# Patient Record
Sex: MALE | Race: WHITE | NOT HISPANIC OR LATINO | Employment: OTHER | URBAN - METROPOLITAN AREA
[De-identification: names, ages, dates, MRNs, and addresses within clinical notes are randomized per-mention and may not be internally consistent; named-entity substitution may affect disease eponyms.]

---

## 2018-04-03 LAB
ABSOL LYMPHOCYTES (HISTORICAL): 1.8 K/UL (ref 0.5–4)
ALBUMIN SERPL BCP-MCNC: 3.7 G/DL (ref 3–5.2)
ALP SERPL-CCNC: 78 U/L (ref 43–122)
ALT SERPL W P-5'-P-CCNC: 62 U/L (ref 9–52)
ANION GAP SERPL CALCULATED.3IONS-SCNC: 8 MMOL/L (ref 5–14)
AST SERPL W P-5'-P-CCNC: 59 U/L (ref 17–59)
B-NP NT PRO (HISTORICAL): 268 PG/ML
BASOPHILS # BLD AUTO: 0 K/UL (ref 0–0.1)
BASOPHILS # BLD AUTO: 1 % (ref 0–1)
BILIRUB SERPL-MCNC: 0.6 MG/DL
BUN SERPL-MCNC: 20 MG/DL (ref 5–25)
CALCIUM SERPL-MCNC: 9.1 MG/DL (ref 8.4–10.2)
CHLORIDE SERPL-SCNC: 101 MEQ/L (ref 97–108)
CHOLEST SERPL-MCNC: 143 MG/DL
CHOLEST/HDLC SERPL: 3 {RATIO}
CO2 SERPL-SCNC: 28 MMOL/L (ref 22–30)
CREATINE, SERUM (HISTORICAL): 1.01 MG/DL (ref 0.7–1.5)
DEPRECATED RDW RBC AUTO: 13.3 %
EGFR (HISTORICAL): >60 ML/MIN/1.73 M2
EOSINOPHIL # BLD AUTO: 0.3 K/UL (ref 0–0.4)
EOSINOPHIL NFR BLD AUTO: 4 % (ref 0–6)
GLUCOSE SERPL-MCNC: 123 MG/DL (ref 70–99)
HCT VFR BLD AUTO: 40.2 % (ref 41–53)
HDLC SERPL-MCNC: 47 MG/DL
HGB BLD-MCNC: 13.4 G/DL (ref 13.5–17.5)
LDL/HDL RATIO (HISTORICAL): 1.4
LDLC SERPL CALC-MCNC: 64 MG/DL
LYMPHOCYTES NFR BLD AUTO: 23 % (ref 25–45)
MCH RBC QN AUTO: 32.4 PG (ref 26–34)
MCHC RBC AUTO-ENTMCNC: 33.3 % (ref 31–36)
MCV RBC AUTO: 97 FL (ref 80–100)
MONOCYTES # BLD AUTO: 0.7 K/UL (ref 0.2–0.9)
MONOCYTES NFR BLD AUTO: 9 % (ref 1–10)
NEUTROPHILS ABS COUNT (HISTORICAL): 5.1 K/UL (ref 1.8–7.8)
NEUTS SEG NFR BLD AUTO: 63 % (ref 45–65)
PLATELET # BLD AUTO: 171 K/MCL (ref 150–450)
POTASSIUM SERPL-SCNC: 4.4 MEQ/L (ref 3.6–5)
RBC # BLD AUTO: 4.13 M/MCL (ref 4.5–5.9)
SODIUM SERPL-SCNC: 137 MEQ/L (ref 137–147)
TOTAL PROTEIN (HISTORICAL): 6.7 G/DL (ref 5.9–8.4)
TRIGL SERPL-MCNC: 162 MG/DL
VLDLC SERPL CALC-MCNC: 32 MG/DL (ref 0–40)
WBC # BLD AUTO: 7.8 K/MCL (ref 4.5–11)

## 2018-05-31 LAB
ABSOL LYMPHOCYTES (HISTORICAL): 1.6 K/UL (ref 0.5–4)
ALBUMIN SERPL BCP-MCNC: 3.4 G/DL (ref 3–5.2)
ALP SERPL-CCNC: 64 U/L (ref 43–122)
ALT SERPL W P-5'-P-CCNC: 63 U/L (ref 9–52)
ANION GAP SERPL CALCULATED.3IONS-SCNC: 6 MMOL/L (ref 5–14)
AST SERPL W P-5'-P-CCNC: 58 U/L (ref 17–59)
B-NP NT PRO (HISTORICAL): 285 PG/ML
BASOPHILS # BLD AUTO: 0.1 K/UL (ref 0–0.1)
BASOPHILS # BLD AUTO: 1 % (ref 0–1)
BILIRUB SERPL-MCNC: 0.7 MG/DL
BUN SERPL-MCNC: 20 MG/DL (ref 5–25)
CALCIUM SERPL-MCNC: 9.1 MG/DL (ref 8.4–10.2)
CHLORIDE SERPL-SCNC: 98 MEQ/L (ref 97–108)
CO2 SERPL-SCNC: 30 MMOL/L (ref 22–30)
CREATINE, SERUM (HISTORICAL): 0.88 MG/DL (ref 0.7–1.5)
DEPRECATED RDW RBC AUTO: 13.5 %
EGFR (HISTORICAL): >60 ML/MIN/1.73 M2
EOSINOPHIL # BLD AUTO: 0.3 K/UL (ref 0–0.4)
EOSINOPHIL NFR BLD AUTO: 5 % (ref 0–6)
GLUCOSE SERPL-MCNC: 115 MG/DL (ref 70–99)
HCT VFR BLD AUTO: 38.1 % (ref 41–53)
HGB BLD-MCNC: 13.3 G/DL (ref 13.5–17.5)
LYMPHOCYTES NFR BLD AUTO: 22 % (ref 25–45)
MCH RBC QN AUTO: 34.5 PG (ref 26–34)
MCHC RBC AUTO-ENTMCNC: 35 % (ref 31–36)
MCV RBC AUTO: 98 FL (ref 80–100)
MONOCYTES # BLD AUTO: 0.7 K/UL (ref 0.2–0.9)
MONOCYTES NFR BLD AUTO: 9 % (ref 1–10)
NEUTROPHILS ABS COUNT (HISTORICAL): 4.6 K/UL (ref 1.8–7.8)
NEUTS SEG NFR BLD AUTO: 63 % (ref 45–65)
PLATELET # BLD AUTO: 159 K/MCL (ref 150–450)
POTASSIUM SERPL-SCNC: 4.4 MEQ/L (ref 3.6–5)
RBC # BLD AUTO: 3.87 M/MCL (ref 4.5–5.9)
SODIUM SERPL-SCNC: 133 MEQ/L (ref 137–147)
TOTAL PROTEIN (HISTORICAL): 6.5 G/DL (ref 5.9–8.4)
WBC # BLD AUTO: 7.3 K/MCL (ref 4.5–11)

## 2018-06-01 LAB
EST. AVERAGE GLUCOSE BLD GHB EST-MCNC: 140 MG/DL
HBA1C MFR BLD HPLC: 6.5 %

## 2018-06-28 ENCOUNTER — OFFICE VISIT (OUTPATIENT)
Dept: FAMILY MEDICINE CLINIC | Facility: CLINIC | Age: 83
End: 2018-06-28
Payer: MEDICARE

## 2018-06-28 VITALS
WEIGHT: 235 LBS | HEART RATE: 88 BPM | TEMPERATURE: 98.2 F | DIASTOLIC BLOOD PRESSURE: 72 MMHG | HEIGHT: 69 IN | SYSTOLIC BLOOD PRESSURE: 126 MMHG | RESPIRATION RATE: 16 BRPM | OXYGEN SATURATION: 99 % | BODY MASS INDEX: 34.8 KG/M2

## 2018-06-28 DIAGNOSIS — I50.32 CHRONIC DIASTOLIC HEART FAILURE DUE TO VALVULAR DISEASE (HCC): Primary | ICD-10-CM

## 2018-06-28 DIAGNOSIS — I38 CHRONIC DIASTOLIC HEART FAILURE DUE TO VALVULAR DISEASE (HCC): Primary | ICD-10-CM

## 2018-06-28 DIAGNOSIS — R07.9 CHEST PAIN IN ADULT: ICD-10-CM

## 2018-06-28 DIAGNOSIS — K21.00 GERD WITH ESOPHAGITIS: ICD-10-CM

## 2018-06-28 DIAGNOSIS — Z95.4 HISTORY OF HEART VALVE REPLACEMENT WITH TISSUE GRAFT: ICD-10-CM

## 2018-06-28 DIAGNOSIS — I10 BENIGN ESSENTIAL HYPERTENSION: ICD-10-CM

## 2018-06-28 DIAGNOSIS — C44.519 BASAL CELL CARCINOMA (BCC) OF SKIN OF OTHER PART OF TORSO: ICD-10-CM

## 2018-06-28 DIAGNOSIS — T82.897S AORTIC PROSTHETIC VALVE REGURGITATION, SEQUELA: ICD-10-CM

## 2018-06-28 DIAGNOSIS — I45.2 RIGHT BUNDLE BRANCH BLOCK WITH LEFT ANTERIOR FASCICULAR BLOCK: ICD-10-CM

## 2018-06-28 DIAGNOSIS — I25.10 MULTIPLE VESSEL CORONARY ARTERY DISEASE: ICD-10-CM

## 2018-06-28 DIAGNOSIS — I34.0 NON-RHEUMATIC MITRAL REGURGITATION: ICD-10-CM

## 2018-06-28 DIAGNOSIS — Z95.2 HISTORY OF AORTIC VALVE REPLACEMENT: ICD-10-CM

## 2018-06-28 PROBLEM — E78.5 HYPERLIPIDEMIA: Status: ACTIVE | Noted: 2017-01-26

## 2018-06-28 PROBLEM — T82.897A AORTIC PROSTHETIC VALVE REGURGITATION: Status: ACTIVE | Noted: 2017-08-10

## 2018-06-28 PROCEDURE — 93000 ELECTROCARDIOGRAM COMPLETE: CPT | Performed by: INTERNAL MEDICINE

## 2018-06-28 PROCEDURE — 99214 OFFICE O/P EST MOD 30 MIN: CPT | Performed by: INTERNAL MEDICINE

## 2018-06-28 RX ORDER — LOSARTAN POTASSIUM 100 MG/1
TABLET ORAL EVERY 24 HOURS
COMMUNITY
Start: 2017-10-03 | End: 2018-08-28 | Stop reason: SDUPTHER

## 2018-06-28 RX ORDER — BUMETANIDE 1 MG/1
1 TABLET ORAL DAILY
Qty: 90 TABLET | Refills: 0 | Status: SHIPPED | OUTPATIENT
Start: 2018-06-28 | End: 2018-08-07 | Stop reason: SDUPTHER

## 2018-06-28 RX ORDER — NITROGLYCERIN 0.4 MG/1
0.4 TABLET SUBLINGUAL
Qty: 90 TABLET | Refills: 0 | Status: SHIPPED | OUTPATIENT
Start: 2018-06-28 | End: 2018-06-28 | Stop reason: SDUPTHER

## 2018-06-28 RX ORDER — PANTOPRAZOLE SODIUM 40 MG/1
40 TABLET, DELAYED RELEASE ORAL
Qty: 30 TABLET | Refills: 11 | Status: SHIPPED | OUTPATIENT
Start: 2018-06-28 | End: 2018-08-28 | Stop reason: SDUPTHER

## 2018-06-28 RX ORDER — BUMETANIDE 1 MG/1
TABLET ORAL
Refills: 3 | COMMUNITY
Start: 2018-05-09 | End: 2018-06-28 | Stop reason: SDUPTHER

## 2018-06-28 RX ORDER — AMLODIPINE BESYLATE 2.5 MG/1
2.5 TABLET ORAL DAILY
Qty: 90 TABLET | Refills: 3 | Status: SHIPPED | OUTPATIENT
Start: 2018-06-28 | End: 2018-08-28 | Stop reason: SDUPTHER

## 2018-06-28 RX ORDER — ATORVASTATIN CALCIUM 20 MG/1
TABLET, FILM COATED ORAL EVERY 24 HOURS
COMMUNITY
Start: 2017-11-06 | End: 2018-08-28 | Stop reason: SDUPTHER

## 2018-06-28 RX ORDER — ATORVASTATIN CALCIUM 20 MG/1
20 TABLET, FILM COATED ORAL DAILY
Refills: 3 | COMMUNITY
Start: 2018-05-04 | End: 2018-06-28

## 2018-06-28 RX ORDER — HYDROCHLOROTHIAZIDE 25 MG/1
25 TABLET ORAL DAILY
Refills: 11 | COMMUNITY
Start: 2018-06-04 | End: 2018-08-28 | Stop reason: SDUPTHER

## 2018-06-28 RX ORDER — NITROGLYCERIN 0.4 MG/1
0.4 TABLET SUBLINGUAL
Qty: 30 TABLET | Refills: 11 | Status: SHIPPED | OUTPATIENT
Start: 2018-06-28 | End: 2018-08-28 | Stop reason: SDUPTHER

## 2018-06-28 RX ORDER — ALLOPURINOL 100 MG/1
100 TABLET ORAL DAILY
Refills: 3 | COMMUNITY
Start: 2018-04-12 | End: 2018-06-28

## 2018-06-28 RX ORDER — AMLODIPINE BESYLATE 5 MG/1
2.5 TABLET ORAL DAILY
COMMUNITY
Start: 2018-03-01 | End: 2018-06-28

## 2018-06-28 RX ORDER — DOXAZOSIN MESYLATE 4 MG/1
TABLET ORAL
COMMUNITY
Start: 2017-12-07 | End: 2018-08-28 | Stop reason: SDUPTHER

## 2018-06-28 RX ORDER — FLUTICASONE PROPIONATE 50 MCG
SPRAY, SUSPENSION (ML) NASAL
Refills: 11 | COMMUNITY
Start: 2018-03-30 | End: 2018-06-28

## 2018-07-03 NOTE — PROGRESS NOTES
Assessment/Plan:  Nevus r chest requires eval and bx by derm, pt referred dr padron(past hx skin ca)  Upper chest pressure likely GERD  ECG stable  Trial incr acid suppression with pantop 40 , Ntg to have at home prn  Diet reviewed  Lifestyle modifications reviewed  Medications reviewed and ordered  Laboratory tests and studies reviewed and ordered  All patient's questions answered to patient satisfaction  Problem List Items Addressed This Visit     Benign essential hypertension    Relevant Medications    doxazosin (CARDURA) 4 mg tablet    hydrochlorothiazide (HYDRODIURIL) 25 mg tablet    losartan (COZAAR) 100 MG tablet    bumetanide (BUMEX) 1 mg tablet    amLODIPine (NORVASC) 2 5 mg tablet    History of aortic valve replacement    History of heart valve replacement with tissue graft    Multiple vessel coronary artery disease    Relevant Medications    amLODIPine (NORVASC) 2 5 mg tablet    nitroglycerin (NITROSTAT) 0 4 mg SL tablet    Non-rheumatic mitral regurgitation    Relevant Medications    amLODIPine (NORVASC) 2 5 mg tablet    nitroglycerin (NITROSTAT) 0 4 mg SL tablet    Aortic prosthetic valve regurgitation    Relevant Medications    amLODIPine (NORVASC) 2 5 mg tablet    nitroglycerin (NITROSTAT) 0 4 mg SL tablet    Right bundle branch block with left anterior fascicular block    Chest pain in adult      Other Visit Diagnoses     Chronic diastolic heart failure due to valvular disease (HCC)    -  Primary    Relevant Medications    bumetanide (BUMEX) 1 mg tablet    amLODIPine (NORVASC) 2 5 mg tablet    nitroglycerin (NITROSTAT) 0 4 mg SL tablet    Other Relevant Orders    POCT ECG    GERD with esophagitis        Relevant Medications    pantoprazole (PROTONIX) 40 mg tablet    Basal cell carcinoma (BCC) of skin of other part of torso                Subjective:      Patient ID: Alexa Uriostegui is a 81 y o  male  82yo m nevus r chest with scab   Hx basal cell in past    Difficulty making timely derm appt  Reviewed with pt how to make appt with dr Gordan Essex  Hx upper chest pressur in morning like prior indigestion  awakined in am with it resolllved in 1 hr with no tx  No exertional sx but sedentayr  Hx of valve surgery  Chronic chf, Likely cad  Not symptomatic  ECG unchanged today from base  ine abnormalities  Edema and swelling gradually decr with incr in diuretics and decr bp meds  The following portions of the patient's history were reviewed and updated as appropriate: allergies, current medications, past family history, past medical history, past social history, past surgical history and problem list     Review of Systems   Constitutional: Negative for appetite change, fatigue, fever and unexpected weight change  HENT: Negative for rhinorrhea, sinus pain, sinus pressure, sneezing and sore throat  Eyes: Negative for visual disturbance  Respiratory: Negative for cough, chest tightness, shortness of breath and wheezing  Cardiovascular: Negative for chest pain, palpitations and leg swelling  Gastrointestinal: Negative for abdominal distention, abdominal pain, blood in stool, constipation, diarrhea, nausea and vomiting  Endocrine: Negative for polydipsia and polyuria  Genitourinary: Negative for decreased urine volume, difficulty urinating, dysuria, hematuria and urgency  Musculoskeletal: Negative for arthralgias, back pain, joint swelling and neck pain  Skin: Negative for rash  Allergic/Immunologic: Negative for environmental allergies  Neurological: Negative for tremors, weakness, light-headedness, numbness and headaches  Hematological: Does not bruise/bleed easily  Psychiatric/Behavioral: Negative for agitation, behavioral problems, confusion and dysphoric mood  The patient is not nervous/anxious            Objective:      /72 (BP Location: Left arm, Patient Position: Sitting, Cuff Size: Standard)   Pulse 88   Temp 98 2 °F (36 8 °C)   Resp 16   Ht 5' 9" (1 753 m) Wt 107 kg (235 lb)   SpO2 99%   BMI 34 70 kg/m²          Physical Exam   Constitutional: He is oriented to person, place, and time  He appears well-developed and well-nourished  No distress  HENT:   Head: Normocephalic and atraumatic  Nose: Nose normal    Mouth/Throat: Oropharynx is clear and moist  No oropharyngeal exudate  Eyes: Conjunctivae and EOM are normal  Pupils are equal, round, and reactive to light  No scleral icterus  Neck: Normal range of motion  Neck supple  Hepatojugular reflux and JVD present  No tracheal deviation present  No thyromegaly present  Cardiovascular: Normal rate and regular rhythm  Exam reveals no gallop and no friction rub  Murmur heard  Crescendo decrescendo systolic murmur is present with a grade of 3/6   Pulmonary/Chest: Effort normal  No respiratory distress  He has no wheezes  He has no rales  He exhibits no tenderness  Abdominal: Soft  Bowel sounds are normal  He exhibits no distension and no mass  There is no tenderness  There is no rebound and no guarding  Musculoskeletal: Normal range of motion  He exhibits edema  He exhibits no deformity  Lymphadenopathy:     He has no cervical adenopathy  Neurological: He is alert and oriented to person, place, and time  No cranial nerve deficit  Coordination normal    Skin: Skin is warm and dry  No rash noted  Psychiatric: He has a normal mood and affect   His behavior is normal  Judgment and thought content normal

## 2018-07-16 ENCOUNTER — TELEPHONE (OUTPATIENT)
Dept: FAMILY MEDICINE CLINIC | Facility: CLINIC | Age: 83
End: 2018-07-16

## 2018-07-16 NOTE — TELEPHONE ENCOUNTER
Patient's wife called stating she needs a refill her husbands Allopurinal 100mg si OD #30 with 5 Refills to [facility] at [facility] [address]  I notified the wife that I will have to check with the doctor before refilling this medication as it was written as discontinued at the 2018 office visit

## 2018-08-03 ENCOUNTER — APPOINTMENT (OUTPATIENT)
Dept: LAB | Age: 83
End: 2018-08-03
Payer: MEDICARE

## 2018-08-03 ENCOUNTER — TRANSCRIBE ORDERS (OUTPATIENT)
Dept: ADMINISTRATIVE | Facility: HOSPITAL | Age: 83
End: 2018-08-03

## 2018-08-03 DIAGNOSIS — R60.9 EDEMA, UNSPECIFIED TYPE: ICD-10-CM

## 2018-08-03 DIAGNOSIS — Z95.2 HEART VALVE REPLACED BY TRANSPLANT: ICD-10-CM

## 2018-08-03 DIAGNOSIS — I10 ESSENTIAL HYPERTENSION, MALIGNANT: Primary | ICD-10-CM

## 2018-08-03 DIAGNOSIS — I10 ESSENTIAL HYPERTENSION, MALIGNANT: ICD-10-CM

## 2018-08-03 LAB
ALBUMIN SERPL BCP-MCNC: 3.3 G/DL (ref 3.5–5)
ALP SERPL-CCNC: 75 U/L (ref 46–116)
ALT SERPL W P-5'-P-CCNC: 62 U/L (ref 12–78)
ANION GAP SERPL CALCULATED.3IONS-SCNC: 7 MMOL/L (ref 4–13)
AST SERPL W P-5'-P-CCNC: 53 U/L (ref 5–45)
BILIRUB SERPL-MCNC: 0.58 MG/DL (ref 0.2–1)
BUN SERPL-MCNC: 16 MG/DL (ref 5–25)
CALCIUM SERPL-MCNC: 8.8 MG/DL (ref 8.3–10.1)
CHLORIDE SERPL-SCNC: 89 MMOL/L (ref 100–108)
CO2 SERPL-SCNC: 29 MMOL/L (ref 21–32)
CREAT SERPL-MCNC: 1.01 MG/DL (ref 0.6–1.3)
GFR SERPL CREATININE-BSD FRML MDRD: 69 ML/MIN/1.73SQ M
GLUCOSE P FAST SERPL-MCNC: 120 MG/DL (ref 65–99)
NT-PROBNP SERPL-MCNC: 257 PG/ML
POTASSIUM SERPL-SCNC: 3.9 MMOL/L (ref 3.5–5.3)
PROT SERPL-MCNC: 7.2 G/DL (ref 6.4–8.2)
SODIUM SERPL-SCNC: 125 MMOL/L (ref 136–145)

## 2018-08-03 PROCEDURE — 80053 COMPREHEN METABOLIC PANEL: CPT

## 2018-08-03 PROCEDURE — 83880 ASSAY OF NATRIURETIC PEPTIDE: CPT

## 2018-08-03 PROCEDURE — 36415 COLL VENOUS BLD VENIPUNCTURE: CPT

## 2018-08-07 ENCOUNTER — OFFICE VISIT (OUTPATIENT)
Dept: FAMILY MEDICINE CLINIC | Facility: CLINIC | Age: 83
End: 2018-08-07
Payer: MEDICARE

## 2018-08-07 VITALS
WEIGHT: 237 LBS | DIASTOLIC BLOOD PRESSURE: 70 MMHG | HEART RATE: 66 BPM | BODY MASS INDEX: 35 KG/M2 | SYSTOLIC BLOOD PRESSURE: 124 MMHG

## 2018-08-07 DIAGNOSIS — I50.32 CHRONIC DIASTOLIC HEART FAILURE DUE TO VALVULAR DISEASE (HCC): ICD-10-CM

## 2018-08-07 DIAGNOSIS — I38 CHRONIC DIASTOLIC HEART FAILURE DUE TO VALVULAR DISEASE (HCC): ICD-10-CM

## 2018-08-07 DIAGNOSIS — Z95.4 HISTORY OF HEART VALVE REPLACEMENT WITH TISSUE GRAFT: ICD-10-CM

## 2018-08-07 DIAGNOSIS — I10 BENIGN ESSENTIAL HYPERTENSION: ICD-10-CM

## 2018-08-07 DIAGNOSIS — R60.9 PERIPHERAL EDEMA: ICD-10-CM

## 2018-08-07 DIAGNOSIS — I34.0 NON-RHEUMATIC MITRAL REGURGITATION: ICD-10-CM

## 2018-08-07 DIAGNOSIS — I25.10 MULTIPLE VESSEL CORONARY ARTERY DISEASE: ICD-10-CM

## 2018-08-07 DIAGNOSIS — T82.897D AORTIC PROSTHETIC VALVE REGURGITATION, SUBSEQUENT ENCOUNTER: ICD-10-CM

## 2018-08-07 DIAGNOSIS — E87.1 HYPONATREMIA WITH EXCESS EXTRACELLULAR FLUID VOLUME: Primary | ICD-10-CM

## 2018-08-07 DIAGNOSIS — Z95.2 HISTORY OF AORTIC VALVE REPLACEMENT: ICD-10-CM

## 2018-08-07 PROCEDURE — 99214 OFFICE O/P EST MOD 30 MIN: CPT | Performed by: INTERNAL MEDICINE

## 2018-08-07 RX ORDER — BUMETANIDE 1 MG/1
1 TABLET ORAL DAILY
Qty: 90 TABLET | Refills: 0 | Status: SHIPPED | OUTPATIENT
Start: 2018-08-07 | End: 2018-08-28 | Stop reason: SDUPTHER

## 2018-08-07 NOTE — PROGRESS NOTES
Assessment/Plan:  The patient feels well without nausea or mental deterioration despite having a sodium that is slowly fallen to 125  He drinks 3-4 quarts of liquid a day, mostly ice tea and glasses of water  He has been on and appropriately low sodium diet that has been enforced by his wife  Since last spring he has been gradually increasing his diuretics from 1 mg of Bumex daily to 2 mg of Bumex daily and then added hydrochlorothiazide which she is taking 25 mg 3 days a week  He is not taking his diuretics today  Overall his lost approximately 5 lb which is he is maintaining since he has increased his diuretic therapy  Edema is much improved  BNP is normal   Blood pressure is well controlled  Patient was placed on fluid restriction 1 core to 1/2 quarts daily  His Bumex and hydrochlorothiazide will be held today and tomorrow and then he will resume Bumex 1 mg daily and continue to hold the hydrochlorothiazide  He will have a BMP done in 2 days and be seen in the office in 3 days and then have another BMP Done in 6 days and be seen in 7 days  He will weigh himself daily and call if he gains more than 3 lb  Or if he becomes confused or nauseated  Diet reviewed  Lifestyle modifications reviewed  Medications reviewed and ordered  Laboratory tests and studies reviewed and ordered  All patient's questions answered to patient satisfaction  Diagnoses and all orders for this visit:    Hyponatremia with excess extracellular fluid volume  -     Basic metabolic panel; Future    Peripheral edema  -     Basic metabolic panel; Future    History of heart valve replacement with tissue graft    History of aortic valve replacement    Benign essential hypertension    Multiple vessel coronary artery disease    Non-rheumatic mitral regurgitation    Aortic prosthetic valve regurgitation, subsequent encounter    Chronic diastolic heart failure due to valvular disease (HCC)  -     bumetanide (BUMEX) 1 mg tablet;  Take 1 tablet (1 mg total) by mouth daily        Subjective:      Patient ID: Kathe Dukes is a 81 y o  male  HPI  This 29-year-old male has been gradually increasing doses of diuretics has lost approximately 5 lb with improvement in his peripheral edema  His BNP is normal   However his sodium has dropped from 133 6 weeks ago 225 with a normal BUN and creatinine and potassium that has maintained in the lower part of the normal range  He is not confused her nauseated  He drinks approximately 3-1/2 to 4 quarts of liquid daily and is careful to keep his sodium intake low  Current Outpatient Prescriptions:     amLODIPine (NORVASC) 2 5 mg tablet, Take 1 tablet (2 5 mg total) by mouth daily, Disp: 90 tablet, Rfl: 3    atorvastatin (LIPITOR) 20 mg tablet, every 24 hours, Disp: , Rfl:     bumetanide (BUMEX) 1 mg tablet, Take 1 tablet (1 mg total) by mouth daily, Disp: 90 tablet, Rfl: 0    doxazosin (CARDURA) 4 mg tablet, take 1 tablet by oral route  every day hs, Disp: , Rfl:     hydrochlorothiazide (HYDRODIURIL) 25 mg tablet, Take 25 mg by mouth daily, Disp: , Rfl: 11    losartan (COZAAR) 100 MG tablet, every 24 hours, Disp: , Rfl:     nitroglycerin (NITROSTAT) 0 4 mg SL tablet, Place 1 tablet (0 4 mg total) under the tongue every 5 (five) minutes as needed for chest pain, Disp: 30 tablet, Rfl: 11    pantoprazole (PROTONIX) 40 mg tablet, Take 1 tablet (40 mg total) by mouth daily at bedtime, Disp: 30 tablet, Rfl: 11    The following portions of the patient's history were reviewed and updated as appropriate: allergies, current medications, past family history, past medical history, past social history, past surgical history and problem list     Review of Systems   Constitutional: Negative for appetite change, fatigue, fever and unexpected weight change  HENT: Negative for rhinorrhea, sinus pain, sinus pressure, sneezing and sore throat  Eyes: Negative for visual disturbance     Respiratory: Negative for cough, chest tightness, shortness of breath and wheezing  Cardiovascular: Negative for chest pain, palpitations and leg swelling  Gastrointestinal: Negative for abdominal distention, abdominal pain, blood in stool, constipation, diarrhea, nausea and vomiting  Endocrine: Negative for polydipsia and polyuria  Genitourinary: Negative for decreased urine volume, difficulty urinating, dysuria, hematuria and urgency  Musculoskeletal: Negative for arthralgias, back pain, joint swelling and neck pain  Skin: Negative for rash  Allergic/Immunologic: Negative for environmental allergies  Neurological: Negative for tremors, weakness, light-headedness, numbness and headaches  Hematological: Does not bruise/bleed easily  Psychiatric/Behavioral: Negative for agitation, behavioral problems, confusion and dysphoric mood  The patient is not nervous/anxious            Family History   Problem Relation Age of Onset    Diabetes Mother     Alcohol abuse Brother     Bipolar disorder Brother     Hypertension Brother     Kidney disease Brother     Heart disease Family     Diabetes type II Family        Past Medical History:   Diagnosis Date    Aortic valve regurgitation 01/26/2017    Arthritis     Cancer ([address] )     Coronary artery disease     Edema 12/02/2016    Heart disease     Heart valve disorder     Heart valve replaced     History of basal cell cancer     Hx of tissue graft 03/29/2017    Hyperlipemia 01/26/2017    Hypertension     Non-rheumatic mitral regurgitation 10/21/2016    Right bundle branch block (RBBB), 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       Past Surgical History:   Procedure Laterality Date    APPENDECTOMY      CARDIAC SURGERY      CARDIAC VALVE REPLACEMENT      EYE SURGERY      HERNIA REPAIR      VASECTOMY         Social History     Social History    Marital status: /Civil Union     Spouse name: N/A    Number of children: N/A    Years of education: N/A     Social History Main Topics    Smoking status: Former Smoker     Types: Cigarettes, Cigars    Smokeless tobacco: Former User      Comment: quit 10 years ago, NEVER A SMOKER AS PER [employer]    Alcohol use No    Drug use: No    Sexual activity: Not Asked     Other Topics Concern    None     Social History Narrative    None       Allergies   Allergen Reactions    Orphenadrine GI Intolerance         Objective:      /70 (BP Location: Right arm, Patient Position: Sitting, Cuff Size: Standard)   Pulse 66   Wt 108 kg (237 lb)   BMI 35 00 kg/m²        Physical Exam   Constitutional: He is oriented to person, place, and time  He appears well-developed and well-nourished  No distress  HENT:   Head: Normocephalic and atraumatic  Nose: Nose normal    Mouth/Throat: Oropharynx is clear and moist  No oropharyngeal exudate  Eyes: Conjunctivae and EOM are normal  Pupils are equal, round, and reactive to light  No scleral icterus  Neck: Normal range of motion  Neck supple  No JVD present  No tracheal deviation present  No thyromegaly present  Cardiovascular: Normal rate, regular rhythm and normal heart sounds  Exam reveals no gallop and no friction rub  No murmur heard  +2 pretib edema bilat   Pulmonary/Chest: Effort normal  No respiratory distress  He has no wheezes  He has no rales  He exhibits no tenderness  Abdominal: Soft  Bowel sounds are normal  He exhibits no distension and no mass  There is no tenderness  There is no rebound and no guarding  Musculoskeletal: Normal range of motion  He exhibits edema  He exhibits no deformity  Lymphadenopathy:     He has no cervical adenopathy  Neurological: He is alert and oriented to person, place, and time  No cranial nerve deficit  Coordination normal    Skin: Skin is warm and dry  No rash noted  Psychiatric: He has a normal mood and affect   His behavior is normal  Judgment and thought content normal

## 2018-08-09 ENCOUNTER — APPOINTMENT (OUTPATIENT)
Dept: LAB | Age: 83
End: 2018-08-09
Payer: MEDICARE

## 2018-08-09 DIAGNOSIS — R60.9 PERIPHERAL EDEMA: ICD-10-CM

## 2018-08-09 DIAGNOSIS — E87.1 HYPONATREMIA WITH EXCESS EXTRACELLULAR FLUID VOLUME: ICD-10-CM

## 2018-08-09 LAB
ANION GAP SERPL CALCULATED.3IONS-SCNC: 7 MMOL/L (ref 4–13)
BUN SERPL-MCNC: 16 MG/DL (ref 5–25)
CALCIUM SERPL-MCNC: 9.2 MG/DL (ref 8.3–10.1)
CHLORIDE SERPL-SCNC: 97 MMOL/L (ref 100–108)
CO2 SERPL-SCNC: 28 MMOL/L (ref 21–32)
CREAT SERPL-MCNC: 1.05 MG/DL (ref 0.6–1.3)
GFR SERPL CREATININE-BSD FRML MDRD: 66 ML/MIN/1.73SQ M
GLUCOSE P FAST SERPL-MCNC: 126 MG/DL (ref 65–99)
POTASSIUM SERPL-SCNC: 4.3 MMOL/L (ref 3.5–5.3)
SODIUM SERPL-SCNC: 132 MMOL/L (ref 136–145)

## 2018-08-09 PROCEDURE — 80048 BASIC METABOLIC PNL TOTAL CA: CPT

## 2018-08-09 PROCEDURE — 36415 COLL VENOUS BLD VENIPUNCTURE: CPT

## 2018-08-10 ENCOUNTER — OFFICE VISIT (OUTPATIENT)
Dept: FAMILY MEDICINE CLINIC | Facility: CLINIC | Age: 83
End: 2018-08-10
Payer: MEDICARE

## 2018-08-10 VITALS
TEMPERATURE: 97.6 F | SYSTOLIC BLOOD PRESSURE: 160 MMHG | DIASTOLIC BLOOD PRESSURE: 56 MMHG | HEIGHT: 69 IN | OXYGEN SATURATION: 96 % | RESPIRATION RATE: 14 BRPM | HEART RATE: 81 BPM | WEIGHT: 234 LBS | BODY MASS INDEX: 34.66 KG/M2

## 2018-08-10 DIAGNOSIS — R60.9 PERIPHERAL EDEMA: ICD-10-CM

## 2018-08-10 DIAGNOSIS — E87.1 HYPONATREMIA WITH EXCESS EXTRACELLULAR FLUID VOLUME: Primary | ICD-10-CM

## 2018-08-10 DIAGNOSIS — E78.5 HYPERLIPIDEMIA, UNSPECIFIED HYPERLIPIDEMIA TYPE: ICD-10-CM

## 2018-08-10 DIAGNOSIS — I10 HYPERTENSION, UNSPECIFIED TYPE: ICD-10-CM

## 2018-08-10 PROCEDURE — 99213 OFFICE O/P EST LOW 20 MIN: CPT | Performed by: INTERNAL MEDICINE

## 2018-08-10 NOTE — PROGRESS NOTES
Assessment/Plan:  Na 133, no wt gain but bp higher today  Restart bumex 1mg/d, water 1 5 L /d, extra 4 mg doxa if bp>160, bmp 3d  Diet reviewed  Lifestyle modifications reviewed  Medications reviewed and ordered  Laboratory tests and studies reviewed and ordered  All patient's questions answered to patient satisfaction  Diagnoses and all orders for this visit:    Hyponatremia with excess extracellular fluid volume  -     Basic metabolic panel; Future    Hypertension, unspecified type    Hyperlipidemia, unspecified hyperlipidemia type    Peripheral edema        Subjective:      Patient ID: Baldo Colon is a 81 y o  male  HPI     This 69-year-old male has noted that he of feels improved his weight has remained stable on his order restricted diet and sodium is up to 133 after just a few days he has not taken any diuretics since I saw him 3 days ago  He is planning on resuming his Bumex 1 mg daily today and returning on Tuesday with a BMP on Monday    The following portions of the patient's history were reviewed and updated as appropriate: allergies, current medications, past family history, past medical history, past social history, past surgical history and problem list     Review of Systems   Constitutional: Negative for appetite change, fatigue, fever and unexpected weight change  HENT: Negative for rhinorrhea, sinus pain, sinus pressure, sneezing and sore throat  Eyes: Negative for visual disturbance  Respiratory: Negative for cough, chest tightness, shortness of breath and wheezing  Cardiovascular: Negative for chest pain, palpitations and leg swelling  Gastrointestinal: Negative for abdominal distention, abdominal pain, blood in stool, constipation, diarrhea, nausea and vomiting  Endocrine: Negative for polydipsia and polyuria  Genitourinary: Negative for decreased urine volume, difficulty urinating, dysuria, hematuria and urgency     Musculoskeletal: Negative for arthralgias, back pain, joint swelling and neck pain  Skin: Negative for rash  Allergic/Immunologic: Negative for environmental allergies  Neurological: Negative for tremors, weakness, light-headedness, numbness and headaches  Hematological: Does not bruise/bleed easily  Psychiatric/Behavioral: Negative for agitation, behavioral problems, confusion and dysphoric mood  The patient is not nervous/anxious            Family History   Problem Relation Age of Onset    Diabetes Mother     Alcohol abuse Brother     Bipolar disorder Brother     Hypertension Brother     Kidney disease Brother     Heart disease Family     Diabetes type II Family        Past Medical History:   Diagnosis Date    Aortic valve regurgitation 01/26/2017    Arthritis     Cancer ([address]     Coronary artery disease     Edema 12/02/2016    Heart disease     Heart valve disorder     Heart valve replaced     History of basal cell cancer     Hx of tissue graft 03/29/2017    Hyperlipemia 01/26/2017    Hypertension     Non-rheumatic mitral regurgitation 10/21/2016    Right bundle branch block (RBBB), 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       Past Surgical History:   Procedure Laterality Date    APPENDECTOMY      CARDIAC SURGERY      CARDIAC VALVE REPLACEMENT      EYE SURGERY      HERNIA REPAIR      VASECTOMY         Social History     Social History    Marital status: /Civil Union     Spouse name: N/A    Number of children: N/A    Years of education: N/A     Social History Main Topics    Smoking status: Former Smoker     Types: Cigarettes, Cigars    Smokeless tobacco: Former User      Comment: quit 10 years ago, NEVER A SMOKER AS PER [employer]    Alcohol use No    Drug use: No    Sexual activity: Not Asked     Other Topics Concern    None     Social History Narrative    None       Allergies   Allergen Reactions    Orphenadrine GI Intolerance         Objective:      /56 (BP Location: Left arm, Patient Position: Sitting, Cuff Size: Large) Comment (BP Location): left  Pulse 81   Temp 97 6 °F (36 4 °C)   Resp 14   Ht 5' 9" (1 753 m)   Wt 106 kg (234 lb)   SpO2 96%   BMI 34 56 kg/m²        Physical Exam   Constitutional: He is oriented to person, place, and time  He appears well-developed and well-nourished  No distress  HENT:   Head: Normocephalic and atraumatic  Nose: Nose normal    Mouth/Throat: Oropharynx is clear and moist  No oropharyngeal exudate  Eyes: Conjunctivae and EOM are normal  Pupils are equal, round, and reactive to light  No scleral icterus  Neck: Normal range of motion  Neck supple  No JVD present  No tracheal deviation present  No thyromegaly present  Cardiovascular: Normal rate and regular rhythm  Exam reveals no gallop and no friction rub  Murmur heard  Pulmonary/Chest: Effort normal  No respiratory distress  He has no wheezes  He has no rales  He exhibits no tenderness  Abdominal: Soft  Bowel sounds are normal  He exhibits no distension and no mass  There is no tenderness  There is no rebound and no guarding  Musculoskeletal: Normal range of motion  He exhibits edema  He exhibits no deformity  Lymphadenopathy:     He has no cervical adenopathy  Neurological: He is alert and oriented to person, place, and time  No cranial nerve deficit  Coordination normal    Skin: Skin is warm and dry  No rash noted  Psychiatric: He has a normal mood and affect   His behavior is normal  Judgment and thought content normal

## 2018-08-13 ENCOUNTER — APPOINTMENT (OUTPATIENT)
Dept: LAB | Age: 83
End: 2018-08-13
Payer: MEDICARE

## 2018-08-13 DIAGNOSIS — E87.1 HYPONATREMIA WITH EXCESS EXTRACELLULAR FLUID VOLUME: ICD-10-CM

## 2018-08-13 LAB
ANION GAP SERPL CALCULATED.3IONS-SCNC: 6 MMOL/L (ref 4–13)
BUN SERPL-MCNC: 23 MG/DL (ref 5–25)
CALCIUM SERPL-MCNC: 9.2 MG/DL (ref 8.3–10.1)
CHLORIDE SERPL-SCNC: 99 MMOL/L (ref 100–108)
CO2 SERPL-SCNC: 30 MMOL/L (ref 21–32)
CREAT SERPL-MCNC: 1.11 MG/DL (ref 0.6–1.3)
GFR SERPL CREATININE-BSD FRML MDRD: 62 ML/MIN/1.73SQ M
GLUCOSE P FAST SERPL-MCNC: 118 MG/DL (ref 65–99)
POTASSIUM SERPL-SCNC: 4.2 MMOL/L (ref 3.5–5.3)
SODIUM SERPL-SCNC: 135 MMOL/L (ref 136–145)

## 2018-08-13 PROCEDURE — 80048 BASIC METABOLIC PNL TOTAL CA: CPT

## 2018-08-13 PROCEDURE — 36415 COLL VENOUS BLD VENIPUNCTURE: CPT

## 2018-08-14 ENCOUNTER — OFFICE VISIT (OUTPATIENT)
Dept: FAMILY MEDICINE CLINIC | Facility: CLINIC | Age: 83
End: 2018-08-14
Payer: MEDICARE

## 2018-08-14 VITALS
HEART RATE: 72 BPM | HEIGHT: 71 IN | OXYGEN SATURATION: 97 % | DIASTOLIC BLOOD PRESSURE: 56 MMHG | TEMPERATURE: 96.8 F | WEIGHT: 230 LBS | SYSTOLIC BLOOD PRESSURE: 118 MMHG | RESPIRATION RATE: 18 BRPM | BODY MASS INDEX: 32.2 KG/M2

## 2018-08-14 DIAGNOSIS — M48.061 SPINAL STENOSIS OF LUMBAR REGION WITHOUT NEUROGENIC CLAUDICATION: ICD-10-CM

## 2018-08-14 DIAGNOSIS — E87.1 HYPONATREMIA WITH EXCESS EXTRACELLULAR FLUID VOLUME: Primary | ICD-10-CM

## 2018-08-14 DIAGNOSIS — I10 HYPERTENSION, UNSPECIFIED TYPE: ICD-10-CM

## 2018-08-14 DIAGNOSIS — I25.119 CORONARY ARTERY DISEASE WITH ANGINA PECTORIS, UNSPECIFIED VESSEL OR LESION TYPE, UNSPECIFIED WHETHER NATIVE OR TRANSPLANTED HEART (HCC): ICD-10-CM

## 2018-08-14 DIAGNOSIS — K59.01 CONSTIPATION BY DELAYED COLONIC TRANSIT: ICD-10-CM

## 2018-08-14 DIAGNOSIS — E78.2 MIXED HYPERLIPIDEMIA: ICD-10-CM

## 2018-08-14 DIAGNOSIS — R60.9 PERIPHERAL EDEMA: ICD-10-CM

## 2018-08-14 PROCEDURE — 99213 OFFICE O/P EST LOW 20 MIN: CPT | Performed by: INTERNAL MEDICINE

## 2018-08-14 NOTE — PROGRESS NOTES
Assessment/Plan:  na135 bun 24 cr 1 11 much improved, incr fluids to 2 0L/d, on bumex 1/d, mouth moisturizer  bp controlled  Has 1+EDEMA SACRAL AND PRETIB  Diet reviewed  Lifestyle modifications reviewed  Medications reviewed and ordered  Laboratory tests and studies reviewed and ordered  All patient's questions answered to patient satisfaction  Diagnoses and all orders for this visit:    Hyponatremia with excess extracellular fluid volume  -     Basic metabolic panel; Future    Peripheral edema  -     Basic metabolic panel; Future    Hypertension, unspecified type    Mixed hyperlipidemia    Coronary artery disease with angina pectoris, unspecified vessel or lesion type, unspecified whether native or transplanted heart ([facility] [address] )    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    Spinal stenosis of lumbar region without neurogenic claudication        Subjective:      Patient ID: Nataliya Berrios is a 81 y o  male  HPI  Na up to 135, off hct and on bumex 1mg/d and water 1 5L/d  Wt down 7 lbs total  Edema mild      Current Outpatient Prescriptions:     amLODIPine (NORVASC) 2 5 mg tablet, Take 1 tablet (2 5 mg total) by mouth daily, Disp: 90 tablet, Rfl: 3    atorvastatin (LIPITOR) 20 mg tablet, every 24 hours, Disp: , Rfl:     bumetanide (BUMEX) 1 mg tablet, Take 1 tablet (1 mg total) by mouth daily, Disp: 90 tablet, Rfl: 0    doxazosin (CARDURA) 4 mg tablet, take 1 tablet by oral route  every day hs, Disp: , Rfl:     hydrochlorothiazide (HYDRODIURIL) 25 mg tablet, Take 25 mg by mouth daily, Disp: , Rfl: 11    losartan (COZAAR) 100 MG tablet, every 24 hours, Disp: , Rfl:     nitroglycerin (NITROSTAT) 0 4 mg SL tablet, Place 1 tablet (0 4 mg total) under the tongue every 5 (five) minutes as needed for chest pain, Disp: 30 tablet, Rfl: 11    pantoprazole (PROTONIX) 40 mg tablet, Take 1 tablet (40 mg total) by mouth daily at bedtime, Disp: 30 tablet, Rfl: 11    The following portions of the patient's history were reviewed and updated as appropriate: allergies, current medications, past family history, past medical history, past social history, past surgical history and problem list     Review of Systems   Constitutional: Negative for appetite change, fatigue, fever and unexpected weight change  HENT: Negative for rhinorrhea, sinus pain, sinus pressure, sneezing and sore throat  Eyes: Negative for visual disturbance  Respiratory: Negative for cough, chest tightness, shortness of breath and wheezing  Cardiovascular: Positive for leg swelling  Negative for chest pain and palpitations  Gastrointestinal: Negative for abdominal distention, abdominal pain, blood in stool, constipation, diarrhea, nausea and vomiting  Endocrine: Negative for polydipsia and polyuria  Genitourinary: Negative for decreased urine volume, difficulty urinating, dysuria, hematuria and urgency  Musculoskeletal: Negative for arthralgias, back pain, joint swelling and neck pain  Skin: Negative for rash  Allergic/Immunologic: Negative for environmental allergies  Neurological: Negative for tremors, weakness, light-headedness, numbness and headaches  Hematological: Does not bruise/bleed easily  Psychiatric/Behavioral: Negative for agitation, behavioral problems, confusion and dysphoric mood  The patient is not nervous/anxious            Family History   Problem Relation Age of Onset    Diabetes Mother     Alcohol abuse Brother     Bipolar disorder Brother     Hypertension Brother     Kidney disease Brother     Heart disease Family     Diabetes type II Family        Past Medical History:   Diagnosis Date    Aortic valve regurgitation 01/26/2017    Arthritis     Cancer ([address] )     Coronary artery disease     Edema 12/02/2016    Heart disease     Heart valve disorder     Heart valve replaced     History of basal cell cancer     Hx of tissue graft 03/29/2017    Hyperlipemia 01/26/2017    Hypertension     Non-rheumatic mitral regurgitation 10/21/2016    Right bundle branch block (RBBB), 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       Past Surgical History:   Procedure Laterality Date    APPENDECTOMY      CARDIAC SURGERY      CARDIAC VALVE REPLACEMENT      EYE SURGERY      HERNIA REPAIR      VASECTOMY         Social History     Social History    Marital status: /Civil Union     Spouse name: N/A    Number of children: N/A    Years of education: N/A     Social History Main Topics    Smoking status: Former Smoker     Types: Cigarettes, Cigars    Smokeless tobacco: Former User      Comment: quit 10 years ago, NEVER A SMOKER AS PER [employer]    Alcohol use No    Drug use: No    Sexual activity: Yes     Partners: Female     Other Topics Concern    None     Social History Narrative    None       Allergies   Allergen Reactions    Orphenadrine GI Intolerance         Objective:      /56 (BP Location: Left arm, Patient Position: Sitting, Cuff Size: Large)   Pulse 72   Temp (!) 96 8 °F (36 °C) (Temporal)   Resp 18   Ht 5' 10 8" (1 798 m)   Wt 104 kg (230 lb)   SpO2 97%   BMI 32 26 kg/m²        Physical Exam   Constitutional: He is oriented to person, place, and time  He appears well-developed and well-nourished  No distress  HENT:   Head: Normocephalic and atraumatic  Nose: Nose normal    Mouth/Throat: Oropharynx is clear and moist  No oropharyngeal exudate  Eyes: Conjunctivae and EOM are normal  Pupils are equal, round, and reactive to light  No scleral icterus  Neck: Normal range of motion  Neck supple  No JVD present  No tracheal deviation present  No thyromegaly present  Cardiovascular: Normal rate, regular rhythm and normal heart sounds  Exam reveals no gallop and no friction rub  No murmur heard  Pulmonary/Chest: Effort normal  No respiratory distress  He has no wheezes  He has no rales  He exhibits no tenderness  Abdominal: Soft   Bowel sounds are normal  He exhibits no distension and no mass  There is no tenderness  There is no rebound and no guarding  Musculoskeletal: Normal range of motion  He exhibits edema  He exhibits no deformity  Lymphadenopathy:     He has no cervical adenopathy  Neurological: He is alert and oriented to person, place, and time  No cranial nerve deficit  Coordination normal    Skin: Skin is warm and dry  No rash noted  Psychiatric: He has a normal mood and affect   His behavior is normal  Judgment and thought content normal

## 2018-08-27 DIAGNOSIS — I10 HYPERTENSION, UNSPECIFIED TYPE: Primary | ICD-10-CM

## 2018-08-27 DIAGNOSIS — E78.5 HYPERLIPIDEMIA, UNSPECIFIED HYPERLIPIDEMIA TYPE: ICD-10-CM

## 2018-08-27 NOTE — TELEPHONE ENCOUNTER
Patient's wife called and requested a medication refill for   Atorvastatin 20 mg tablets-1 tablet daily and Losartan   100 mg tablets1 tablet daily and she would like it called into [facility] at [facility] [phone]

## 2018-08-28 DIAGNOSIS — I10 HYPERTENSION, UNSPECIFIED TYPE: ICD-10-CM

## 2018-08-28 DIAGNOSIS — K21.00 GERD WITH ESOPHAGITIS: ICD-10-CM

## 2018-08-28 DIAGNOSIS — I38 CHRONIC DIASTOLIC HEART FAILURE DUE TO VALVULAR DISEASE (HCC): Primary | ICD-10-CM

## 2018-08-28 DIAGNOSIS — I50.32 CHRONIC DIASTOLIC HEART FAILURE DUE TO VALVULAR DISEASE (HCC): Primary | ICD-10-CM

## 2018-08-28 RX ORDER — BUMETANIDE 1 MG/1
1 TABLET ORAL DAILY
Qty: 90 TABLET | Refills: 1 | Status: SHIPPED | OUTPATIENT
Start: 2018-08-28 | End: 2018-10-05 | Stop reason: SDUPTHER

## 2018-08-28 RX ORDER — ATORVASTATIN CALCIUM 20 MG/1
20 TABLET, FILM COATED ORAL EVERY 24 HOURS
Qty: 30 TABLET | Refills: 6 | Status: SHIPPED | OUTPATIENT
Start: 2018-08-28 | End: 2018-10-05 | Stop reason: SDUPTHER

## 2018-08-28 RX ORDER — AMLODIPINE BESYLATE 2.5 MG/1
2.5 TABLET ORAL DAILY
Qty: 90 TABLET | Refills: 1 | Status: SHIPPED | OUTPATIENT
Start: 2018-08-28 | End: 2019-07-10 | Stop reason: SDUPTHER

## 2018-08-28 RX ORDER — LOSARTAN POTASSIUM 100 MG/1
100 TABLET ORAL EVERY 24 HOURS
Qty: 30 TABLET | Refills: 6 | Status: SHIPPED | OUTPATIENT
Start: 2018-08-28 | End: 2018-08-28 | Stop reason: SDUPTHER

## 2018-08-28 RX ORDER — PANTOPRAZOLE SODIUM 40 MG/1
40 TABLET, DELAYED RELEASE ORAL
Qty: 90 TABLET | Refills: 1 | Status: SHIPPED | OUTPATIENT
Start: 2018-08-28 | End: 2022-03-10 | Stop reason: SDUPTHER

## 2018-08-28 RX ORDER — LOSARTAN POTASSIUM 100 MG/1
100 TABLET ORAL EVERY 24 HOURS
Qty: 30 TABLET | Refills: 0 | Status: SHIPPED | OUTPATIENT
Start: 2018-08-28 | End: 2018-10-05 | Stop reason: SDUPTHER

## 2018-08-28 RX ORDER — DOXAZOSIN MESYLATE 4 MG/1
4 TABLET ORAL
Qty: 90 TABLET | Refills: 1 | Status: SHIPPED | OUTPATIENT
Start: 2018-08-28 | End: 2019-02-25 | Stop reason: SDUPTHER

## 2018-08-28 RX ORDER — NITROGLYCERIN 0.4 MG/1
0.4 TABLET SUBLINGUAL
Qty: 90 TABLET | Refills: 1 | Status: SHIPPED | OUTPATIENT
Start: 2018-08-28

## 2018-08-28 RX ORDER — HYDROCHLOROTHIAZIDE 25 MG/1
25 TABLET ORAL DAILY
Qty: 90 TABLET | Refills: 1 | Status: SHIPPED | OUTPATIENT
Start: 2018-08-28 | End: 2018-09-04

## 2018-08-28 NOTE — TELEPHONE ENCOUNTER
----- Message from Antonio Alcantara MA sent at 8/7/2018  1:45 PM EDT -----  Regarding: med refills  Contact: [phone]  To refill his scripts

## 2018-08-30 ENCOUNTER — APPOINTMENT (OUTPATIENT)
Dept: LAB | Age: 83
End: 2018-08-30
Payer: MEDICARE

## 2018-08-30 DIAGNOSIS — E87.1 HYPONATREMIA WITH EXCESS EXTRACELLULAR FLUID VOLUME: ICD-10-CM

## 2018-08-30 DIAGNOSIS — R60.9 PERIPHERAL EDEMA: ICD-10-CM

## 2018-08-30 LAB
ANION GAP SERPL CALCULATED.3IONS-SCNC: 6 MMOL/L (ref 4–13)
BUN SERPL-MCNC: 17 MG/DL (ref 5–25)
CALCIUM SERPL-MCNC: 8.9 MG/DL (ref 8.3–10.1)
CHLORIDE SERPL-SCNC: 101 MMOL/L (ref 100–108)
CO2 SERPL-SCNC: 29 MMOL/L (ref 21–32)
CREAT SERPL-MCNC: 1.04 MG/DL (ref 0.6–1.3)
GFR SERPL CREATININE-BSD FRML MDRD: 67 ML/MIN/1.73SQ M
GLUCOSE P FAST SERPL-MCNC: 128 MG/DL (ref 65–99)
POTASSIUM SERPL-SCNC: 4.1 MMOL/L (ref 3.5–5.3)
SODIUM SERPL-SCNC: 136 MMOL/L (ref 136–145)

## 2018-08-30 PROCEDURE — 36415 COLL VENOUS BLD VENIPUNCTURE: CPT

## 2018-08-30 PROCEDURE — 80048 BASIC METABOLIC PNL TOTAL CA: CPT

## 2018-09-04 ENCOUNTER — OFFICE VISIT (OUTPATIENT)
Dept: FAMILY MEDICINE CLINIC | Facility: CLINIC | Age: 83
End: 2018-09-04
Payer: MEDICARE

## 2018-09-04 VITALS
BODY MASS INDEX: 35.16 KG/M2 | OXYGEN SATURATION: 96 % | SYSTOLIC BLOOD PRESSURE: 122 MMHG | HEART RATE: 76 BPM | DIASTOLIC BLOOD PRESSURE: 70 MMHG | HEIGHT: 68 IN | WEIGHT: 232 LBS

## 2018-09-04 DIAGNOSIS — Z95.4 HISTORY OF HEART VALVE REPLACEMENT WITH TISSUE GRAFT: ICD-10-CM

## 2018-09-04 DIAGNOSIS — I34.0 NON-RHEUMATIC MITRAL REGURGITATION: ICD-10-CM

## 2018-09-04 DIAGNOSIS — E87.1 HYPONATREMIA: Primary | ICD-10-CM

## 2018-09-04 DIAGNOSIS — Z95.2 HISTORY OF AORTIC VALVE REPLACEMENT: ICD-10-CM

## 2018-09-04 DIAGNOSIS — R60.9 PERIPHERAL EDEMA: ICD-10-CM

## 2018-09-04 DIAGNOSIS — I10 BENIGN ESSENTIAL HYPERTENSION: ICD-10-CM

## 2018-09-04 DIAGNOSIS — T82.897S AORTIC PROSTHETIC VALVE REGURGITATION, SEQUELA: ICD-10-CM

## 2018-09-04 PROCEDURE — 99213 OFFICE O/P EST LOW 20 MIN: CPT | Performed by: INTERNAL MEDICINE

## 2018-09-04 NOTE — PROGRESS NOTES
Assessment/Plan:    Sodium stable at 136  Continue fluid restriction 1 5 L per day  Patient will have basal cell removed from area above right nasal labial fold tomorrow  His told the hold his aspirin tonight and if they do minimal surgery to take more night and if they do more than minimal surgery to hold it until the next day and resume it at that time  Diet reviewed  Lifestyle modifications reviewed  Medications reviewed and ordered  Laboratory tests and studies reviewed and ordered  All patient's questions answered to patient satisfaction  Diagnoses and all orders for this visit:    Hyponatremia  -     Basic metabolic panel; Future    History of aortic valve replacement    History of heart valve replacement with tissue graft    Peripheral edema    Benign essential hypertension    Non-rheumatic mitral regurgitation    Aortic prosthetic valve regurgitation, sequela        Subjective:      Patient ID: Melly Pulido is a 81 y o  male  HPI  This patient has been maintaining a 1 5 L fluid restriction and sodium restriction in order to control his ankle edema me a and hypertension and hyponatremia  Recent sodium is 136 well-controlled BUN creatinine are slightly improved  Patient feels very well he is planning to proceed with his right facial surgery tomorrow as noted below      Current Outpatient Prescriptions:     amLODIPine (NORVASC) 2 5 mg tablet, Take 1 tablet (2 5 mg total) by mouth daily, Disp: 90 tablet, Rfl: 1    atorvastatin (LIPITOR) 20 mg tablet, Take 1 tablet (20 mg total) by mouth every 24 hours, Disp: 30 tablet, Rfl: 6    bumetanide (BUMEX) 1 mg tablet, Take 1 tablet (1 mg total) by mouth daily, Disp: 90 tablet, Rfl: 1    doxazosin (CARDURA) 4 mg tablet, Take 1 tablet (4 mg total) by mouth daily at bedtime, Disp: 90 tablet, Rfl: 1    losartan (COZAAR) 100 MG tablet, Take 1 tablet (100 mg total) by mouth every 24 hours, Disp: 30 tablet, Rfl: 0    nitroglycerin (NITROSTAT) 0 4 mg SL tablet, Place 1 tablet (0 4 mg total) under the tongue every 5 (five) minutes as needed for chest pain, Disp: 90 tablet, Rfl: 1    pantoprazole (PROTONIX) 40 mg tablet, Take 1 tablet (40 mg total) by mouth daily at bedtime, Disp: 90 tablet, Rfl: 1    The following portions of the patient's history were reviewed and updated as appropriate: allergies, current medications, past family history, past medical history, past social history, past surgical history and problem list     Review of Systems      Family History   Problem Relation Age of Onset    Diabetes Mother     Alcohol abuse Brother     Bipolar disorder Brother     Hypertension Brother     Kidney disease Brother     Heart disease Family     Diabetes type II Family        Past Medical History:   Diagnosis Date    Aortic valve regurgitation 01/26/2017    Arthritis     Cancer ([address] )     Coronary artery disease     Edema 12/02/2016    Heart disease     Heart valve disorder     Heart valve replaced     History of basal cell cancer     Hx of tissue graft 03/29/2017    Hyperlipemia 01/26/2017    Hypertension     Non-rheumatic mitral regurgitation 10/21/2016    Right bundle branch block (RBBB), 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       Past Surgical History:   Procedure Laterality Date    APPENDECTOMY      CARDIAC SURGERY      CARDIAC VALVE REPLACEMENT      EYE SURGERY      HERNIA REPAIR      VASECTOMY         Social History     Social History    Marital status: /Civil Union     Spouse name: N/A    Number of children: N/A    Years of education: N/A     Social History Main Topics    Smoking status: Former Smoker     Types: Cigarettes, Cigars    Smokeless tobacco: Former User      Comment: quit 10 years ago, NEVER A SMOKER AS PER [employer]    Alcohol use No    Drug use: No    Sexual activity: Yes     Partners: Female     Other Topics Concern    None     Social History Narrative    None       Allergies   Allergen Reactions    Orphenadrine GI Intolerance         Objective:      /70 (BP Location: Right arm, Patient Position: Sitting, Cuff Size: Standard)   Pulse 76   Ht 5' 8" (1 727 m)   Wt 105 kg (232 lb)   SpO2 96%   BMI 35 28 kg/m²        Physical Exam   Constitutional: He is oriented to person, place, and time  He appears well-developed and well-nourished  No distress  HENT:   Head: Normocephalic and atraumatic  Nose: Nose normal    Mouth/Throat: Oropharynx is clear and moist  No oropharyngeal exudate  Eyes: Conjunctivae and EOM are normal  Pupils are equal, round, and reactive to light  No scleral icterus  Neck: Normal range of motion  Neck supple  No JVD present  No tracheal deviation present  No thyromegaly present  Positive hepatic jugular reflux moderate   Cardiovascular: Normal rate and regular rhythm  Exam reveals no gallop and no friction rub  Murmur heard  Pulmonary/Chest: Effort normal  No respiratory distress  He has no wheezes  He has no rales  He exhibits no tenderness  Abdominal: Soft  Bowel sounds are normal  He exhibits no distension and no mass  There is no tenderness  There is no rebound and no guarding  Musculoskeletal: Normal range of motion  He exhibits edema  He exhibits no deformity  Lymphadenopathy:     He has no cervical adenopathy  Neurological: He is alert and oriented to person, place, and time  No cranial nerve deficit  Coordination normal    Skin: Skin is warm and dry  No rash noted  Psychiatric: He has a normal mood and affect   His behavior is normal  Judgment and thought content normal

## 2018-09-27 ENCOUNTER — APPOINTMENT (OUTPATIENT)
Dept: LAB | Age: 83
End: 2018-09-27
Payer: MEDICARE

## 2018-09-27 DIAGNOSIS — E87.1 HYPONATREMIA: ICD-10-CM

## 2018-09-27 LAB
ANION GAP SERPL CALCULATED.3IONS-SCNC: 5 MMOL/L (ref 4–13)
BUN SERPL-MCNC: 19 MG/DL (ref 5–25)
CALCIUM SERPL-MCNC: 8.7 MG/DL (ref 8.3–10.1)
CHLORIDE SERPL-SCNC: 102 MMOL/L (ref 100–108)
CO2 SERPL-SCNC: 27 MMOL/L (ref 21–32)
CREAT SERPL-MCNC: 1.12 MG/DL (ref 0.6–1.3)
GFR SERPL CREATININE-BSD FRML MDRD: 61 ML/MIN/1.73SQ M
GLUCOSE P FAST SERPL-MCNC: 132 MG/DL (ref 65–99)
POTASSIUM SERPL-SCNC: 3.9 MMOL/L (ref 3.5–5.3)
SODIUM SERPL-SCNC: 134 MMOL/L (ref 136–145)

## 2018-09-27 PROCEDURE — 36415 COLL VENOUS BLD VENIPUNCTURE: CPT

## 2018-09-27 PROCEDURE — 80048 BASIC METABOLIC PNL TOTAL CA: CPT

## 2018-10-05 ENCOUNTER — OFFICE VISIT (OUTPATIENT)
Dept: FAMILY MEDICINE CLINIC | Facility: CLINIC | Age: 83
End: 2018-10-05
Payer: MEDICARE

## 2018-10-05 VITALS
HEIGHT: 68 IN | WEIGHT: 230 LBS | DIASTOLIC BLOOD PRESSURE: 62 MMHG | BODY MASS INDEX: 34.86 KG/M2 | SYSTOLIC BLOOD PRESSURE: 126 MMHG | OXYGEN SATURATION: 96 % | HEART RATE: 67 BPM

## 2018-10-05 DIAGNOSIS — I10 HYPERTENSION, UNSPECIFIED TYPE: ICD-10-CM

## 2018-10-05 DIAGNOSIS — E87.1 HYPONATREMIA: Primary | ICD-10-CM

## 2018-10-05 DIAGNOSIS — I50.32 CHRONIC DIASTOLIC HEART FAILURE DUE TO VALVULAR DISEASE (HCC): ICD-10-CM

## 2018-10-05 DIAGNOSIS — I38 CHRONIC DIASTOLIC HEART FAILURE DUE TO VALVULAR DISEASE (HCC): ICD-10-CM

## 2018-10-05 DIAGNOSIS — Z23 ENCOUNTER FOR IMMUNIZATION: ICD-10-CM

## 2018-10-05 DIAGNOSIS — Z95.4 HISTORY OF AORTIC VALVE REPLACEMENT WITH TISSUE GRAFT: ICD-10-CM

## 2018-10-05 DIAGNOSIS — I34.0 NON-RHEUMATIC MITRAL REGURGITATION: ICD-10-CM

## 2018-10-05 DIAGNOSIS — E11.9 TYPE 2 DIABETES MELLITUS WITHOUT COMPLICATION, WITHOUT LONG-TERM CURRENT USE OF INSULIN (HCC): ICD-10-CM

## 2018-10-05 DIAGNOSIS — E78.5 HYPERLIPIDEMIA, UNSPECIFIED HYPERLIPIDEMIA TYPE: ICD-10-CM

## 2018-10-05 PROCEDURE — 90662 IIV NO PRSV INCREASED AG IM: CPT

## 2018-10-05 PROCEDURE — G0008 ADMIN INFLUENZA VIRUS VAC: HCPCS

## 2018-10-05 PROCEDURE — 99214 OFFICE O/P EST MOD 30 MIN: CPT | Performed by: INTERNAL MEDICINE

## 2018-10-05 RX ORDER — BUMETANIDE 1 MG/1
1 TABLET ORAL DAILY
Qty: 90 TABLET | Refills: 3 | Status: SHIPPED | OUTPATIENT
Start: 2018-10-05 | End: 2019-10-23 | Stop reason: SDUPTHER

## 2018-10-05 RX ORDER — ATORVASTATIN CALCIUM 20 MG/1
20 TABLET, FILM COATED ORAL EVERY 24 HOURS
Qty: 90 TABLET | Refills: 3 | Status: SHIPPED | OUTPATIENT
Start: 2018-10-05 | End: 2019-10-23 | Stop reason: SDUPTHER

## 2018-10-05 RX ORDER — LOSARTAN POTASSIUM 100 MG/1
100 TABLET ORAL EVERY 24 HOURS
Qty: 90 TABLET | Refills: 3 | Status: SHIPPED | OUTPATIENT
Start: 2018-10-05 | End: 2019-05-24 | Stop reason: SDUPTHER

## 2018-10-05 NOTE — PROGRESS NOTES
Assessment/Plan:  Patient will continue water restriction to 1/2 to 2 L of liquid daily, sodium is 134 renal function is normal   Trace edema is present bilaterally he is on Bumex 1 mg daily  Hemoglobin A1c is 6 6 fasting blood sugars 132, diet was emphasized and reviewed  Follow up hemoglobin A1c will be done  Patient had aortic valve replacement with a tissue graft in the past it has been over a year since his last echo he has a history of aortic prosthetic valve regurgitation and mitral regurgitation these will be reassessed with a echo before the next appointment    Diet reviewed  Lifestyle modifications reviewed  Medications reviewed and ordered  Laboratory tests and studies reviewed and ordered  All patient's questions answered to patient satisfaction  Diagnoses and all orders for this visit:    Hyponatremia  -     Hemoglobin A1C; Future  -     Basic metabolic panel; Future    History of aortic valve replacement with tissue graft  -     Echo complete with contrast if indicated; Future    Type 2 diabetes mellitus without complication, without long-term current use of insulin (HCC)  -     Hemoglobin A1C; Future  -     Basic metabolic panel; Future    Hypertension, unspecified type  -     losartan (COZAAR) 100 MG tablet; Take 1 tablet (100 mg total) by mouth every 24 hours    Non-rheumatic mitral regurgitation    Encounter for immunization  -     influenza vaccine, 1010-4952, high-dose, PF 0 5 mL, for patients 65 yr+ (FLUZONE HIGH-DOSE)    Hyperlipidemia, unspecified hyperlipidemia type  -     atorvastatin (LIPITOR) 20 mg tablet; Take 1 tablet (20 mg total) by mouth every 24 hours    Chronic diastolic heart failure due to valvular disease (HCC)  -     bumetanide (BUMEX) 1 mg tablet; Take 1 tablet (1 mg total) by mouth daily  -     Echo complete with contrast if indicated; Future        Subjective:      Patient ID: Theora Leyden is a 81 y o  male      HPI  This 78-year-old male was seen for the following:    Hyponatremia secondary to diuretic therapy and increase fluid intake in the past he has restricting his fluids steadily to 1 5-2 L per day and restricting sodium intake also serum sodium was 134 recently with normal renal function  Borderline diabetes mellitus:  Patient's careful of diet but does eat sweet desserts  Hemoglobin A1c 6 6 fasting blood sugar 132  Tissue aortic valve replacement  Due for reassessment of aortic valve regurgitation with echo  Patient also has history of mitral regurgitation  Hypertension is well controlled    Hyperlipidemia is well controlled  Current Outpatient Prescriptions:     amLODIPine (NORVASC) 2 5 mg tablet, Take 1 tablet (2 5 mg total) by mouth daily, Disp: 90 tablet, Rfl: 1    atorvastatin (LIPITOR) 20 mg tablet, Take 1 tablet (20 mg total) by mouth every 24 hours, Disp: 90 tablet, Rfl: 3    bumetanide (BUMEX) 1 mg tablet, Take 1 tablet (1 mg total) by mouth daily, Disp: 90 tablet, Rfl: 3    doxazosin (CARDURA) 4 mg tablet, Take 1 tablet (4 mg total) by mouth daily at bedtime, Disp: 90 tablet, Rfl: 1    losartan (COZAAR) 100 MG tablet, Take 1 tablet (100 mg total) by mouth every 24 hours, Disp: 90 tablet, Rfl: 3    nitroglycerin (NITROSTAT) 0 4 mg SL tablet, Place 1 tablet (0 4 mg total) under the tongue every 5 (five) minutes as needed for chest pain, Disp: 90 tablet, Rfl: 1    pantoprazole (PROTONIX) 40 mg tablet, Take 1 tablet (40 mg total) by mouth daily at bedtime, Disp: 90 tablet, Rfl: 1    The following portions of the patient's history were reviewed and updated as appropriate: allergies, current medications, past family history, past medical history, past social history, past surgical history and problem list     Review of Systems   Constitutional: Negative for appetite change, fatigue, fever and unexpected weight change  HENT: Negative for rhinorrhea, sinus pain, sinus pressure, sneezing and sore throat      Eyes: Negative for visual disturbance  Respiratory: Negative for cough, chest tightness, shortness of breath and wheezing  Cardiovascular: Negative for chest pain, palpitations and leg swelling  Gastrointestinal: Negative for abdominal distention, abdominal pain, blood in stool, constipation, diarrhea, nausea and vomiting  Endocrine: Negative for polydipsia and polyuria  Genitourinary: Negative for decreased urine volume, difficulty urinating, dysuria, hematuria and urgency  Musculoskeletal: Negative for arthralgias, back pain, joint swelling and neck pain  Skin: Negative for rash  Allergic/Immunologic: Negative for environmental allergies  Neurological: Negative for tremors, weakness, light-headedness, numbness and headaches  Hematological: Does not bruise/bleed easily  Psychiatric/Behavioral: Negative for agitation, behavioral problems, confusion and dysphoric mood  The patient is not nervous/anxious            Family History   Problem Relation Age of Onset    Diabetes Mother     Alcohol abuse Brother     Bipolar disorder Brother     Hypertension Brother     Kidney disease Brother     Heart disease Family     Diabetes type II Family        Past Medical History:   Diagnosis Date    Aortic valve regurgitation 01/26/2017    Arthritis     Cancer ([address]     Coronary artery disease     Edema 12/02/2016    Heart disease     Heart valve disorder     Heart valve replaced     History of basal cell cancer     Hx of tissue graft 03/29/2017    Hyperlipemia 01/26/2017    Hypertension     Non-rheumatic mitral regurgitation 10/21/2016    Right bundle branch block (RBBB), 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       Past Surgical History:   Procedure Laterality Date    APPENDECTOMY      CARDIAC SURGERY      CARDIAC VALVE REPLACEMENT      EYE SURGERY      HERNIA REPAIR      VASECTOMY         Social History     Social History    Marital status: /Civil Union Spouse name: N/A    Number of children: N/A    Years of education: N/A     Social History Main Topics    Smoking status: Former Smoker     Types: Cigarettes, Cigars    Smokeless tobacco: Former User      Comment: quit 10 years ago, NEVER A SMOKER AS PER [employer]    Alcohol use No    Drug use: No    Sexual activity: Yes     Partners: Female     Other Topics Concern    None     Social History Narrative    None       Allergies   Allergen Reactions    Orphenadrine GI Intolerance         Objective:      /62 (BP Location: Right arm, Patient Position: Sitting, Cuff Size: Standard)   Pulse 67   Ht 5' 8" (1 727 m)   Wt 104 kg (230 lb)   SpO2 96%   BMI 34 97 kg/m²        Physical Exam   Constitutional: He is oriented to person, place, and time  He appears well-developed and well-nourished  No distress  HENT:   Head: Normocephalic and atraumatic  Nose: Nose normal    Mouth/Throat: Oropharynx is clear and moist  No oropharyngeal exudate  Eyes: Pupils are equal, round, and reactive to light  Conjunctivae and EOM are normal  No scleral icterus  Neck: Normal range of motion  Neck supple  No JVD present  No tracheal deviation present  No thyromegaly present  Cardiovascular: Normal rate and regular rhythm  Exam reveals no gallop and no friction rub  Murmur heard  Pulmonary/Chest: Effort normal  No respiratory distress  He has no wheezes  He has no rales  He exhibits no tenderness  Abdominal: Soft  Bowel sounds are normal  He exhibits no distension and no mass  There is no tenderness  There is no rebound and no guarding  Musculoskeletal: Normal range of motion  He exhibits edema  He exhibits no deformity  Lymphadenopathy:     He has no cervical adenopathy  Neurological: He is alert and oriented to person, place, and time  No cranial nerve deficit  Coordination normal    Skin: Skin is warm and dry  No rash noted  Psychiatric: He has a normal mood and affect   His behavior is normal  Judgment and thought content normal

## 2018-10-15 ENCOUNTER — HOSPITAL ENCOUNTER (OUTPATIENT)
Dept: NON INVASIVE DIAGNOSTICS | Facility: HOSPITAL | Age: 83
Discharge: HOME | End: 2018-10-15
Payer: MEDICARE

## 2018-10-15 DIAGNOSIS — Z95.4 HISTORY OF AORTIC VALVE REPLACEMENT WITH TISSUE GRAFT: ICD-10-CM

## 2018-10-15 DIAGNOSIS — I50.32 CHRONIC DIASTOLIC HEART FAILURE DUE TO VALVULAR DISEASE (HCC): ICD-10-CM

## 2018-10-15 DIAGNOSIS — I38 CHRONIC DIASTOLIC HEART FAILURE DUE TO VALVULAR DISEASE (HCC): ICD-10-CM

## 2018-10-15 PROCEDURE — 93306 TTE W/DOPPLER COMPLETE: CPT

## 2018-10-16 PROCEDURE — 93306 TTE W/DOPPLER COMPLETE: CPT | Performed by: INTERNAL MEDICINE

## 2018-11-09 ENCOUNTER — APPOINTMENT (OUTPATIENT)
Dept: LAB | Age: 83
End: 2018-11-09
Payer: MEDICARE

## 2018-11-09 DIAGNOSIS — E87.1 HYPONATREMIA: ICD-10-CM

## 2018-11-09 DIAGNOSIS — E11.9 TYPE 2 DIABETES MELLITUS WITHOUT COMPLICATION, WITHOUT LONG-TERM CURRENT USE OF INSULIN (HCC): ICD-10-CM

## 2018-11-09 LAB
ANION GAP SERPL CALCULATED.3IONS-SCNC: 1 MMOL/L (ref 4–13)
BUN SERPL-MCNC: 22 MG/DL (ref 5–25)
CALCIUM SERPL-MCNC: 8.9 MG/DL (ref 8.3–10.1)
CHLORIDE SERPL-SCNC: 101 MMOL/L (ref 100–108)
CO2 SERPL-SCNC: 32 MMOL/L (ref 21–32)
CREAT SERPL-MCNC: 1.1 MG/DL (ref 0.6–1.3)
EST. AVERAGE GLUCOSE BLD GHB EST-MCNC: 143 MG/DL
GFR SERPL CREATININE-BSD FRML MDRD: 62 ML/MIN/1.73SQ M
GLUCOSE P FAST SERPL-MCNC: 124 MG/DL (ref 65–99)
HBA1C MFR BLD: 6.6 % (ref 4.2–6.3)
POTASSIUM SERPL-SCNC: 4.1 MMOL/L (ref 3.5–5.3)
SODIUM SERPL-SCNC: 134 MMOL/L (ref 136–145)

## 2018-11-09 PROCEDURE — 36415 COLL VENOUS BLD VENIPUNCTURE: CPT

## 2018-11-09 PROCEDURE — 83036 HEMOGLOBIN GLYCOSYLATED A1C: CPT

## 2018-11-09 PROCEDURE — 80048 BASIC METABOLIC PNL TOTAL CA: CPT

## 2018-11-16 ENCOUNTER — OFFICE VISIT (OUTPATIENT)
Dept: FAMILY MEDICINE CLINIC | Facility: CLINIC | Age: 83
End: 2018-11-16
Payer: MEDICARE

## 2018-11-16 VITALS
BODY MASS INDEX: 34.86 KG/M2 | DIASTOLIC BLOOD PRESSURE: 70 MMHG | SYSTOLIC BLOOD PRESSURE: 148 MMHG | WEIGHT: 230 LBS | OXYGEN SATURATION: 98 % | HEIGHT: 68 IN | HEART RATE: 72 BPM

## 2018-11-16 DIAGNOSIS — E11.9 TYPE 2 DIABETES MELLITUS WITHOUT COMPLICATION, WITHOUT LONG-TERM CURRENT USE OF INSULIN (HCC): ICD-10-CM

## 2018-11-16 DIAGNOSIS — I10 BENIGN ESSENTIAL HYPERTENSION: ICD-10-CM

## 2018-11-16 DIAGNOSIS — Z95.4 HISTORY OF HEART VALVE REPLACEMENT WITH TISSUE GRAFT: ICD-10-CM

## 2018-11-16 DIAGNOSIS — Z95.2 HISTORY OF AORTIC VALVE REPLACEMENT: ICD-10-CM

## 2018-11-16 DIAGNOSIS — Z95.4 HISTORY OF AORTIC VALVE REPLACEMENT WITH TISSUE GRAFT: ICD-10-CM

## 2018-11-16 DIAGNOSIS — E87.1 HYPONATREMIA: Primary | ICD-10-CM

## 2018-11-16 PROCEDURE — 99214 OFFICE O/P EST MOD 30 MIN: CPT | Performed by: INTERNAL MEDICINE

## 2018-11-16 NOTE — PROGRESS NOTES
Assessment/Plan:  Patient's sodium although slightly low at 132 has basically been well controlled in the patient is tolerating his water restriction currently well medications are stable  Echo demonstrates a moderately elevated prosthetic aortic valve velocity but the  felt this was appropriate for prosthetic valve of this type and that overall was working well  Diabetes is well controlled with hemoglobin A1c of 6 6 the patient is controlled on diet alone    Diet reviewed  Lifestyle modifications reviewed  Medications reviewed and ordered  Laboratory tests and studies reviewed and ordered  All patient's questions answered to patient satisfaction  Diagnoses and all orders for this visit:    Hyponatremia  -     Basic metabolic panel; Future    History of aortic valve replacement with tissue graft    Type 2 diabetes mellitus without complication, without long-term current use of insulin (HCC)    Benign essential hypertension    History of aortic valve replacement    History of heart valve replacement with tissue graft        Subjective:      Patient ID: Zully Blank is a 81 y o  male  HPI  This 59-year-old male is here for the following:    Hyponatremia secondary to increased fluid intake and diuretic therapy and congestive heart failure  Electrolytes were reviewed with the patient as noted above treatment was reviewed with the patient detail  Type 2 diabetes mellitus controlled with diet hemoglobin A1c 6 6 patient would prefer no medication at this time and will here to his diet    Prosthetic aortic valve patient had a 2D echo the results were very good as noted above this will be followed in the year with another echo      Current Outpatient Prescriptions:     amLODIPine (NORVASC) 2 5 mg tablet, Take 1 tablet (2 5 mg total) by mouth daily, Disp: 90 tablet, Rfl: 1    atorvastatin (LIPITOR) 20 mg tablet, Take 1 tablet (20 mg total) by mouth every 24 hours, Disp: 90 tablet, Rfl: 3   bumetanide (BUMEX) 1 mg tablet, Take 1 tablet (1 mg total) by mouth daily, Disp: 90 tablet, Rfl: 3    doxazosin (CARDURA) 4 mg tablet, Take 1 tablet (4 mg total) by mouth daily at bedtime, Disp: 90 tablet, Rfl: 1    losartan (COZAAR) 100 MG tablet, Take 1 tablet (100 mg total) by mouth every 24 hours, Disp: 90 tablet, Rfl: 3    nitroglycerin (NITROSTAT) 0 4 mg SL tablet, Place 1 tablet (0 4 mg total) under the tongue every 5 (five) minutes as needed for chest pain, Disp: 90 tablet, Rfl: 1    pantoprazole (PROTONIX) 40 mg tablet, Take 1 tablet (40 mg total) by mouth daily at bedtime, Disp: 90 tablet, Rfl: 1    The following portions of the patient's history were reviewed and updated as appropriate: allergies, current medications, past family history, past medical history, past social history, past surgical history and problem list     Review of Systems   Constitutional: Negative for appetite change, fatigue, fever and unexpected weight change  HENT: Negative for rhinorrhea, sinus pain, sinus pressure, sneezing and sore throat  Eyes: Negative for visual disturbance  Respiratory: Negative for cough, chest tightness, shortness of breath and wheezing  Cardiovascular: Negative for chest pain, palpitations and leg swelling  Gastrointestinal: Negative for abdominal distention, abdominal pain, blood in stool, constipation, diarrhea, nausea and vomiting  Endocrine: Negative for polydipsia and polyuria  Genitourinary: Negative for decreased urine volume, difficulty urinating, dysuria, hematuria and urgency  Musculoskeletal: Negative for arthralgias, back pain, joint swelling and neck pain  Skin: Negative for rash  Allergic/Immunologic: Negative for environmental allergies  Neurological: Negative for tremors, weakness, light-headedness, numbness and headaches  Hematological: Does not bruise/bleed easily     Psychiatric/Behavioral: Negative for agitation, behavioral problems, confusion and dysphoric mood  The patient is not nervous/anxious  Family History   Problem Relation Age of Onset    Diabetes Mother     Alcohol abuse Brother     Bipolar disorder Brother     Hypertension Brother     Kidney disease Brother     Heart disease Family     Diabetes type II Family        Past Medical History:   Diagnosis Date    Aortic valve regurgitation 01/26/2017    Arthritis     Cancer ([address] )     Coronary artery disease     Edema 12/02/2016    Heart disease     Heart valve disorder     Heart valve replaced     History of basal cell cancer     Hx of tissue graft 03/29/2017    Hyperlipemia 01/26/2017    Hypertension     Non-rheumatic mitral regurgitation 10/21/2016    Right bundle branch block (RBBB), 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       Past Surgical History:   Procedure Laterality Date    APPENDECTOMY      CARDIAC SURGERY      CARDIAC VALVE REPLACEMENT      EYE SURGERY      HERNIA REPAIR      VASECTOMY         Social History     Social History    Marital status: /Civil Union     Spouse name: N/A    Number of children: N/A    Years of education: N/A     Social History Main Topics    Smoking status: Former Smoker     Types: Cigarettes, Cigars    Smokeless tobacco: Former User      Comment: quit 10 years ago, NEVER A SMOKER AS PER [employer]    Alcohol use No    Drug use: No    Sexual activity: Yes     Partners: Female     Other Topics Concern    None     Social History Narrative    None       Allergies   Allergen Reactions    Orphenadrine GI Intolerance         Objective:      /70 (BP Location: Right arm, Patient Position: Sitting, Cuff Size: Large)   Pulse 72   Ht 5' 8" (1 727 m)   Wt 104 kg (230 lb)   SpO2 98%   BMI 34 97 kg/m²        Physical Exam   Constitutional: He is oriented to person, place, and time  He appears well-developed and well-nourished  No distress  HENT:   Head: Normocephalic and atraumatic  Nose: Nose normal    Mouth/Throat: Oropharynx is clear and moist  No oropharyngeal exudate  Eyes: Pupils are equal, round, and reactive to light  Conjunctivae and EOM are normal  No scleral icterus  Neck: Normal range of motion  Neck supple  No JVD present  No tracheal deviation present  No thyromegaly present  Cardiovascular: Normal rate and regular rhythm  Exam reveals no gallop and no friction rub  Murmur heard  Pulmonary/Chest: Effort normal  No respiratory distress  He has no wheezes  He has no rales  He exhibits no tenderness  Abdominal: Soft  Bowel sounds are normal  He exhibits no distension and no mass  There is no tenderness  There is no rebound and no guarding  Musculoskeletal: Normal range of motion  He exhibits edema  He exhibits no deformity  Trace to slightly more than trace bilateral pretibial edema   Lymphadenopathy:     He has no cervical adenopathy  Neurological: He is alert and oriented to person, place, and time  No cranial nerve deficit  Coordination normal    Skin: Skin is warm and dry  No rash noted  Psychiatric: He has a normal mood and affect   His behavior is normal  Judgment and thought content normal

## 2018-12-07 ENCOUNTER — APPOINTMENT (OUTPATIENT)
Dept: LAB | Age: 83
End: 2018-12-07
Payer: MEDICARE

## 2018-12-07 DIAGNOSIS — E87.1 HYPONATREMIA: ICD-10-CM

## 2018-12-07 LAB
ANION GAP SERPL CALCULATED.3IONS-SCNC: 5 MMOL/L (ref 4–13)
BUN SERPL-MCNC: 21 MG/DL (ref 5–25)
CALCIUM SERPL-MCNC: 9.3 MG/DL (ref 8.3–10.1)
CHLORIDE SERPL-SCNC: 102 MMOL/L (ref 100–108)
CO2 SERPL-SCNC: 30 MMOL/L (ref 21–32)
CREAT SERPL-MCNC: 1.09 MG/DL (ref 0.6–1.3)
GFR SERPL CREATININE-BSD FRML MDRD: 63 ML/MIN/1.73SQ M
GLUCOSE P FAST SERPL-MCNC: 131 MG/DL (ref 65–99)
POTASSIUM SERPL-SCNC: 4 MMOL/L (ref 3.5–5.3)
SODIUM SERPL-SCNC: 137 MMOL/L (ref 136–145)

## 2018-12-07 PROCEDURE — 36415 COLL VENOUS BLD VENIPUNCTURE: CPT

## 2018-12-07 PROCEDURE — 80048 BASIC METABOLIC PNL TOTAL CA: CPT

## 2018-12-12 RX ORDER — ALLOPURINOL 100 MG/1
100 TABLET ORAL DAILY
Refills: 3 | COMMUNITY
Start: 2018-10-08 | End: 2019-05-24 | Stop reason: SDUPTHER

## 2018-12-14 ENCOUNTER — OFFICE VISIT (OUTPATIENT)
Dept: FAMILY MEDICINE CLINIC | Facility: CLINIC | Age: 83
End: 2018-12-14
Payer: MEDICARE

## 2018-12-14 VITALS
HEIGHT: 68 IN | TEMPERATURE: 97.2 F | HEART RATE: 76 BPM | BODY MASS INDEX: 35.01 KG/M2 | WEIGHT: 231 LBS | RESPIRATION RATE: 17 BRPM | OXYGEN SATURATION: 98 % | SYSTOLIC BLOOD PRESSURE: 148 MMHG | DIASTOLIC BLOOD PRESSURE: 62 MMHG

## 2018-12-14 DIAGNOSIS — E87.1 HYPONATREMIA: Primary | ICD-10-CM

## 2018-12-14 DIAGNOSIS — I25.10 MULTIPLE VESSEL CORONARY ARTERY DISEASE: ICD-10-CM

## 2018-12-14 DIAGNOSIS — I34.0 NON-RHEUMATIC MITRAL REGURGITATION: ICD-10-CM

## 2018-12-14 DIAGNOSIS — T82.897S AORTIC PROSTHETIC VALVE REGURGITATION, SEQUELA: ICD-10-CM

## 2018-12-14 DIAGNOSIS — I10 BENIGN ESSENTIAL HYPERTENSION: ICD-10-CM

## 2018-12-14 DIAGNOSIS — Z95.4 HISTORY OF HEART VALVE REPLACEMENT WITH TISSUE GRAFT: ICD-10-CM

## 2018-12-14 DIAGNOSIS — I45.2 RIGHT BUNDLE BRANCH BLOCK WITH LEFT ANTERIOR FASCICULAR BLOCK: ICD-10-CM

## 2018-12-14 DIAGNOSIS — E11.9 TYPE 2 DIABETES MELLITUS WITHOUT COMPLICATION, WITHOUT LONG-TERM CURRENT USE OF INSULIN (HCC): ICD-10-CM

## 2018-12-14 PROCEDURE — 93000 ELECTROCARDIOGRAM COMPLETE: CPT | Performed by: INTERNAL MEDICINE

## 2018-12-14 PROCEDURE — 99214 OFFICE O/P EST MOD 30 MIN: CPT | Performed by: INTERNAL MEDICINE

## 2018-12-14 RX ORDER — METFORMIN HYDROCHLORIDE 500 MG/1
500 TABLET, EXTENDED RELEASE ORAL
Qty: 90 TABLET | Refills: 3 | Status: SHIPPED | OUTPATIENT
Start: 2018-12-14 | End: 2019-11-27 | Stop reason: SDUPTHER

## 2018-12-14 NOTE — PROGRESS NOTES
Assessment/Plan:     the patient's hyponatremia is improved at 137  Renal function is normal with GFR of 62  The patient has only mild peripheral edema  Blood pressures are well controlled home in the 271 systolic  EKG continues demonstrated right bundle-branch block and left axis deviation of minus 29°  Blood sugars fasting continuing the 130s the the patient feels he would like to start metformin  mg before supper daily  He is normally constipated and perhaps this will help relieve his constipation  Diet reviewed  Lifestyle modifications reviewed  Medications reviewed and ordered  Laboratory tests and studies reviewed and ordered  All patient's questions answered to patient satisfaction  Diagnoses and all orders for this visit:    Hyponatremia  -     CBC and differential; Future  -     Basic metabolic panel; Future  -     CBC and differential; Future  -     Basic metabolic panel; Future  -     metFORMIN (GLUCOPHAGE-XR) 500 mg 24 hr tablet; Take 1 tablet (500 mg total) by mouth daily with dinner    History of heart valve replacement with tissue graft  -     CBC and differential; Future  -     Basic metabolic panel; Future    Type 2 diabetes mellitus without complication, without long-term current use of insulin (HCC)  -     Microalbumin / creatinine urine ratio  -     POCT ECG  -     CBC and differential; Future  -     Basic metabolic panel; Future  -     CBC and differential; Future  -     Basic metabolic panel; Future  -     metFORMIN (GLUCOPHAGE-XR) 500 mg 24 hr tablet; Take 1 tablet (500 mg total) by mouth daily with dinner  -     Blood Glucose Monitoring Suppl KIT; by Does not apply route daily Test once daily  -     glucose blood (ONE TOUCH ULTRA TEST) test strip; Use as instructed    Benign essential hypertension  -     CBC and differential; Future  -     Basic metabolic panel;  Future    Right bundle branch block with left anterior fascicular block  -     Microalbumin / creatinine urine ratio  -     POCT ECG  -     CBC and differential; Future  -     Basic metabolic panel; Future    Non-rheumatic mitral regurgitation  -     CBC and differential; Future  -     Basic metabolic panel; Future    Multiple vessel coronary artery disease  -     CBC and differential; Future  -     Basic metabolic panel; Future    Aortic prosthetic valve regurgitation, sequela  -     CBC and differential; Future  -     Basic metabolic panel; Future    Other orders  -     allopurinol (ZYLOPRIM) 100 mg tablet; Take 100 mg by mouth daily  -     Cancel: POCT ECG        Subjective:      Patient ID: Theora Leyden is a 81 y o  male  HPI  This 15-year-old male is seen for the following:    Hyponatremia sodium is now 137 patient's restricting his water appropriately he has mild +1 peripheral edema on Bumex 1 mg daily  GFR is 64  Hypertension although slightly elevated here if it is in the 120s at home and well controlled on the current amlodipine 2 5 mg daily and metoprolol  Type 2 diabetes mellitus patient's blood sugar remains in the 130s  He would like to start checking sugars at home and take a low dose of metformin he has chronic constipation which he thinks will be helped by this  EKG continues to demonstrate right bundle-branch block with left anterior fascicular block that is borderline with a QRS of -29 degrees  Prosthetic aortic valve stable with no shortness of breath or chest pain      Current Outpatient Prescriptions:     allopurinol (ZYLOPRIM) 100 mg tablet, Take 100 mg by mouth daily, Disp: , Rfl: 3    amLODIPine (NORVASC) 2 5 mg tablet, Take 1 tablet (2 5 mg total) by mouth daily, Disp: 90 tablet, Rfl: 1    atorvastatin (LIPITOR) 20 mg tablet, Take 1 tablet (20 mg total) by mouth every 24 hours, Disp: 90 tablet, Rfl: 3    bumetanide (BUMEX) 1 mg tablet, Take 1 tablet (1 mg total) by mouth daily, Disp: 90 tablet, Rfl: 3    doxazosin (CARDURA) 4 mg tablet, Take 1 tablet (4 mg total) by mouth daily at bedtime, Disp: 90 tablet, Rfl: 1    losartan (COZAAR) 100 MG tablet, Take 1 tablet (100 mg total) by mouth every 24 hours, Disp: 90 tablet, Rfl: 3    nitroglycerin (NITROSTAT) 0 4 mg SL tablet, Place 1 tablet (0 4 mg total) under the tongue every 5 (five) minutes as needed for chest pain, Disp: 90 tablet, Rfl: 1    pantoprazole (PROTONIX) 40 mg tablet, Take 1 tablet (40 mg total) by mouth daily at bedtime, Disp: 90 tablet, Rfl: 1    Blood Glucose Monitoring Suppl KIT, by Does not apply route daily Test once daily, Disp: 1 each, Rfl: 0    glucose blood (ONE TOUCH ULTRA TEST) test strip, Use as instructed, Disp: 100 each, Rfl: 6    metFORMIN (GLUCOPHAGE-XR) 500 mg 24 hr tablet, Take 1 tablet (500 mg total) by mouth daily with dinner, Disp: 90 tablet, Rfl: 3    The following portions of the patient's history were reviewed and updated as appropriate: allergies, current medications, past family history, past medical history, past social history, past surgical history and problem list     Review of Systems   Constitutional: Negative for appetite change, fatigue, fever and unexpected weight change  HENT: Negative for rhinorrhea, sinus pain, sinus pressure, sneezing and sore throat  Eyes: Negative for visual disturbance  Respiratory: Negative for cough, chest tightness, shortness of breath and wheezing  Cardiovascular: Negative for chest pain, palpitations and leg swelling  Gastrointestinal: Negative for abdominal distention, abdominal pain, blood in stool, constipation, diarrhea, nausea and vomiting  Endocrine: Negative for polydipsia and polyuria  Genitourinary: Negative for decreased urine volume, difficulty urinating, dysuria, hematuria and urgency  Musculoskeletal: Negative for arthralgias, back pain, joint swelling and neck pain  Skin: Negative for rash  Allergic/Immunologic: Negative for environmental allergies     Neurological: Negative for tremors, weakness, light-headedness, numbness and headaches  Hematological: Does not bruise/bleed easily  Psychiatric/Behavioral: Negative for agitation, behavioral problems, confusion and dysphoric mood  The patient is not nervous/anxious            Family History   Problem Relation Age of Onset    Diabetes Mother     Alcohol abuse Brother     Bipolar disorder Brother     Hypertension Brother     Kidney disease Brother     Heart disease Family     Diabetes type II Family        Past Medical History:   Diagnosis Date    Aortic valve regurgitation 01/26/2017    Arthritis     Cancer ([address]     Coronary artery disease     Edema 12/02/2016    Heart disease     Heart valve disorder     Heart valve replaced     History of basal cell cancer     Hx of tissue graft 03/29/2017    Hyperlipemia 01/26/2017    Hypertension     Non-rheumatic mitral regurgitation 10/21/2016    Right bundle branch block (RBBB), 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       Past Surgical History:   Procedure Laterality Date    APPENDECTOMY      CARDIAC SURGERY      CARDIAC VALVE REPLACEMENT      EYE SURGERY      HERNIA REPAIR      VASECTOMY         Social History     Social History    Marital status: /Civil Union     Spouse name: N/A    Number of children: 3    Years of education: N/A     Occupational History    retired      Social History Main Topics    Smoking status: Former Smoker     Types: Cigarettes, Cigars    Smokeless tobacco: Former User      Comment: quit 10 years ago, NEVER A SMOKER AS PER [employer]    Alcohol use No    Drug use: No    Sexual activity: Yes     Partners: Female     Other Topics Concern    None     Social History Narrative    PT states he does not drink any caffeine        Allergies   Allergen Reactions    Orphenadrine GI Intolerance         Objective:      /62   Pulse 76   Temp (!) 97 2 °F (36 2 °C)   Resp 17   Ht 5' 8" (1 727 m)   Wt 105 kg (231 lb)   SpO2 98%   BMI 35 12 kg/m²        Physical Exam   Constitutional: He is oriented to person, place, and time  He appears well-developed and well-nourished  No distress  HENT:   Head: Normocephalic and atraumatic  Nose: Nose normal    Mouth/Throat: Oropharynx is clear and moist  No oropharyngeal exudate  Eyes: Pupils are equal, round, and reactive to light  Conjunctivae and EOM are normal  No scleral icterus  Neck: Normal range of motion  Neck supple  No JVD present  No tracheal deviation present  No thyromegaly present  Cardiovascular: Normal rate, regular rhythm and normal heart sounds  Exam reveals no gallop and no friction rub  No murmur heard  Pulmonary/Chest: Effort normal  No respiratory distress  He has no wheezes  He has no rales  He exhibits no tenderness  Abdominal: Soft  Bowel sounds are normal  He exhibits no distension and no mass  There is no tenderness  There is no rebound and no guarding  Musculoskeletal: Normal range of motion  He exhibits edema  He exhibits no deformity  Lymphadenopathy:     He has no cervical adenopathy  Neurological: He is alert and oriented to person, place, and time  No cranial nerve deficit  Coordination normal    Skin: Skin is warm and dry  No rash noted  Psychiatric: He has a normal mood and affect   His behavior is normal  Judgment and thought content normal

## 2018-12-19 ENCOUNTER — TELEPHONE (OUTPATIENT)
Dept: FAMILY MEDICINE CLINIC | Facility: CLINIC | Age: 83
End: 2018-12-19

## 2018-12-27 DIAGNOSIS — E11.9 TYPE 2 DIABETES MELLITUS WITHOUT COMPLICATION, WITHOUT LONG-TERM CURRENT USE OF INSULIN (HCC): Primary | ICD-10-CM

## 2018-12-27 RX ORDER — BLOOD-GLUCOSE METER
EACH MISCELLANEOUS DAILY
Qty: 1 EACH | Refills: 0 | Status: SHIPPED | OUTPATIENT
Start: 2018-12-27 | End: 2019-11-29 | Stop reason: SDUPTHER

## 2019-01-11 ENCOUNTER — APPOINTMENT (OUTPATIENT)
Dept: LAB | Age: 84
End: 2019-01-11
Payer: MEDICARE

## 2019-01-11 DIAGNOSIS — I25.10 MULTIPLE VESSEL CORONARY ARTERY DISEASE: ICD-10-CM

## 2019-01-11 DIAGNOSIS — T82.897S AORTIC PROSTHETIC VALVE REGURGITATION, SEQUELA: ICD-10-CM

## 2019-01-11 DIAGNOSIS — I10 BENIGN ESSENTIAL HYPERTENSION: ICD-10-CM

## 2019-01-11 DIAGNOSIS — E87.1 HYPONATREMIA: ICD-10-CM

## 2019-01-11 DIAGNOSIS — Z95.4 HISTORY OF HEART VALVE REPLACEMENT WITH TISSUE GRAFT: ICD-10-CM

## 2019-01-11 DIAGNOSIS — I45.2 RIGHT BUNDLE BRANCH BLOCK WITH LEFT ANTERIOR FASCICULAR BLOCK: ICD-10-CM

## 2019-01-11 DIAGNOSIS — E11.9 TYPE 2 DIABETES MELLITUS WITHOUT COMPLICATION, WITHOUT LONG-TERM CURRENT USE OF INSULIN (HCC): ICD-10-CM

## 2019-01-11 DIAGNOSIS — I34.0 NON-RHEUMATIC MITRAL REGURGITATION: ICD-10-CM

## 2019-01-11 LAB
ANION GAP SERPL CALCULATED.3IONS-SCNC: 6 MMOL/L (ref 4–13)
BASOPHILS # BLD AUTO: 0.05 THOUSANDS/ΜL (ref 0–0.1)
BASOPHILS NFR BLD AUTO: 1 % (ref 0–1)
BUN SERPL-MCNC: 23 MG/DL (ref 5–25)
CALCIUM SERPL-MCNC: 8.9 MG/DL (ref 8.3–10.1)
CHLORIDE SERPL-SCNC: 101 MMOL/L (ref 100–108)
CO2 SERPL-SCNC: 28 MMOL/L (ref 21–32)
CREAT SERPL-MCNC: 1.27 MG/DL (ref 0.6–1.3)
EOSINOPHIL # BLD AUTO: 0.31 THOUSAND/ΜL (ref 0–0.61)
EOSINOPHIL NFR BLD AUTO: 4 % (ref 0–6)
ERYTHROCYTE [DISTWIDTH] IN BLOOD BY AUTOMATED COUNT: 12.7 % (ref 11.6–15.1)
GFR SERPL CREATININE-BSD FRML MDRD: 52 ML/MIN/1.73SQ M
GLUCOSE P FAST SERPL-MCNC: 127 MG/DL (ref 65–99)
HCT VFR BLD AUTO: 39 % (ref 36.5–49.3)
HGB BLD-MCNC: 12.9 G/DL (ref 12–17)
IMM GRANULOCYTES # BLD AUTO: 0.03 THOUSAND/UL (ref 0–0.2)
IMM GRANULOCYTES NFR BLD AUTO: 0 % (ref 0–2)
LYMPHOCYTES # BLD AUTO: 2.08 THOUSANDS/ΜL (ref 0.6–4.47)
LYMPHOCYTES NFR BLD AUTO: 26 % (ref 14–44)
MCH RBC QN AUTO: 33.3 PG (ref 26.8–34.3)
MCHC RBC AUTO-ENTMCNC: 33.1 G/DL (ref 31.4–37.4)
MCV RBC AUTO: 101 FL (ref 82–98)
MONOCYTES # BLD AUTO: 0.71 THOUSAND/ΜL (ref 0.17–1.22)
MONOCYTES NFR BLD AUTO: 9 % (ref 4–12)
NEUTROPHILS # BLD AUTO: 4.97 THOUSANDS/ΜL (ref 1.85–7.62)
NEUTS SEG NFR BLD AUTO: 60 % (ref 43–75)
NRBC BLD AUTO-RTO: 0 /100 WBCS
PLATELET # BLD AUTO: 179 THOUSANDS/UL (ref 149–390)
PMV BLD AUTO: 10.4 FL (ref 8.9–12.7)
POTASSIUM SERPL-SCNC: 4.2 MMOL/L (ref 3.5–5.3)
RBC # BLD AUTO: 3.87 MILLION/UL (ref 3.88–5.62)
SODIUM SERPL-SCNC: 135 MMOL/L (ref 136–145)
WBC # BLD AUTO: 8.15 THOUSAND/UL (ref 4.31–10.16)

## 2019-01-11 PROCEDURE — 36415 COLL VENOUS BLD VENIPUNCTURE: CPT

## 2019-01-11 PROCEDURE — 85025 COMPLETE CBC W/AUTO DIFF WBC: CPT

## 2019-01-11 PROCEDURE — 80048 BASIC METABOLIC PNL TOTAL CA: CPT

## 2019-01-17 ENCOUNTER — OFFICE VISIT (OUTPATIENT)
Dept: FAMILY MEDICINE CLINIC | Facility: CLINIC | Age: 84
End: 2019-01-17
Payer: MEDICARE

## 2019-01-17 VITALS
TEMPERATURE: 97.7 F | BODY MASS INDEX: 34.49 KG/M2 | DIASTOLIC BLOOD PRESSURE: 70 MMHG | OXYGEN SATURATION: 95 % | HEART RATE: 76 BPM | HEIGHT: 68 IN | RESPIRATION RATE: 16 BRPM | WEIGHT: 227.6 LBS | SYSTOLIC BLOOD PRESSURE: 146 MMHG

## 2019-01-17 DIAGNOSIS — T82.897S AORTIC PROSTHETIC VALVE REGURGITATION, SEQUELA: ICD-10-CM

## 2019-01-17 DIAGNOSIS — E11.9 TYPE 2 DIABETES MELLITUS WITHOUT COMPLICATION, WITHOUT LONG-TERM CURRENT USE OF INSULIN (HCC): Primary | ICD-10-CM

## 2019-01-17 DIAGNOSIS — R60.0 LOCALIZED EDEMA: ICD-10-CM

## 2019-01-17 DIAGNOSIS — E87.1 HYPONATREMIA WITH EXCESS EXTRACELLULAR FLUID VOLUME: ICD-10-CM

## 2019-01-17 DIAGNOSIS — I25.10 MULTIPLE VESSEL CORONARY ARTERY DISEASE: ICD-10-CM

## 2019-01-17 DIAGNOSIS — I34.0 NON-RHEUMATIC MITRAL REGURGITATION: ICD-10-CM

## 2019-01-17 DIAGNOSIS — I10 BENIGN ESSENTIAL HYPERTENSION: ICD-10-CM

## 2019-01-17 PROCEDURE — 99214 OFFICE O/P EST MOD 30 MIN: CPT | Performed by: INTERNAL MEDICINE

## 2019-01-17 RX ORDER — IPRATROPIUM BROMIDE 21 UG/1
SPRAY, METERED NASAL
Refills: 11 | COMMUNITY
Start: 2019-01-14 | End: 2020-01-24 | Stop reason: SDUPTHER

## 2019-01-17 RX ORDER — FLUTICASONE PROPIONATE 50 MCG
SPRAY, SUSPENSION (ML) NASAL
Refills: 11 | COMMUNITY
Start: 2019-01-14 | End: 2020-01-24 | Stop reason: SDUPTHER

## 2019-02-25 DIAGNOSIS — I50.32 CHRONIC DIASTOLIC HEART FAILURE DUE TO VALVULAR DISEASE (HCC): ICD-10-CM

## 2019-02-25 DIAGNOSIS — I10 HYPERTENSION, UNSPECIFIED TYPE: ICD-10-CM

## 2019-02-25 DIAGNOSIS — I38 CHRONIC DIASTOLIC HEART FAILURE DUE TO VALVULAR DISEASE (HCC): ICD-10-CM

## 2019-02-25 DIAGNOSIS — K21.00 GERD WITH ESOPHAGITIS: ICD-10-CM

## 2019-02-26 RX ORDER — DOXAZOSIN MESYLATE 4 MG/1
4 TABLET ORAL
Qty: 90 TABLET | Refills: 1 | Status: SHIPPED | OUTPATIENT
Start: 2019-02-26 | End: 2019-08-26 | Stop reason: SDUPTHER

## 2019-03-14 ENCOUNTER — APPOINTMENT (OUTPATIENT)
Dept: LAB | Age: 84
End: 2019-03-14
Payer: MEDICARE

## 2019-03-14 DIAGNOSIS — E11.9 TYPE 2 DIABETES MELLITUS WITHOUT COMPLICATION, WITHOUT LONG-TERM CURRENT USE OF INSULIN (HCC): ICD-10-CM

## 2019-03-14 LAB
ALBUMIN SERPL BCP-MCNC: 3.2 G/DL (ref 3.5–5)
ALP SERPL-CCNC: 75 U/L (ref 46–116)
ALT SERPL W P-5'-P-CCNC: 42 U/L (ref 12–78)
ANION GAP SERPL CALCULATED.3IONS-SCNC: 4 MMOL/L (ref 4–13)
AST SERPL W P-5'-P-CCNC: 38 U/L (ref 5–45)
BASOPHILS # BLD AUTO: 0.03 THOUSANDS/ΜL (ref 0–0.1)
BASOPHILS NFR BLD AUTO: 0 % (ref 0–1)
BILIRUB SERPL-MCNC: 0.62 MG/DL (ref 0.2–1)
BUN SERPL-MCNC: 22 MG/DL (ref 5–25)
CALCIUM SERPL-MCNC: 8.7 MG/DL (ref 8.3–10.1)
CHLORIDE SERPL-SCNC: 102 MMOL/L (ref 100–108)
CO2 SERPL-SCNC: 29 MMOL/L (ref 21–32)
CREAT SERPL-MCNC: 1.1 MG/DL (ref 0.6–1.3)
EOSINOPHIL # BLD AUTO: 0.31 THOUSAND/ΜL (ref 0–0.61)
EOSINOPHIL NFR BLD AUTO: 4 % (ref 0–6)
ERYTHROCYTE [DISTWIDTH] IN BLOOD BY AUTOMATED COUNT: 12.7 % (ref 11.6–15.1)
EST. AVERAGE GLUCOSE BLD GHB EST-MCNC: 137 MG/DL
GFR SERPL CREATININE-BSD FRML MDRD: 62 ML/MIN/1.73SQ M
GLUCOSE P FAST SERPL-MCNC: 117 MG/DL (ref 65–99)
HBA1C MFR BLD: 6.4 % (ref 4.2–6.3)
HCT VFR BLD AUTO: 37.4 % (ref 36.5–49.3)
HGB BLD-MCNC: 12.5 G/DL (ref 12–17)
IMM GRANULOCYTES # BLD AUTO: 0.02 THOUSAND/UL (ref 0–0.2)
IMM GRANULOCYTES NFR BLD AUTO: 0 % (ref 0–2)
LYMPHOCYTES # BLD AUTO: 1.94 THOUSANDS/ΜL (ref 0.6–4.47)
LYMPHOCYTES NFR BLD AUTO: 26 % (ref 14–44)
MCH RBC QN AUTO: 33.4 PG (ref 26.8–34.3)
MCHC RBC AUTO-ENTMCNC: 33.4 G/DL (ref 31.4–37.4)
MCV RBC AUTO: 100 FL (ref 82–98)
MONOCYTES # BLD AUTO: 0.76 THOUSAND/ΜL (ref 0.17–1.22)
MONOCYTES NFR BLD AUTO: 10 % (ref 4–12)
NEUTROPHILS # BLD AUTO: 4.4 THOUSANDS/ΜL (ref 1.85–7.62)
NEUTS SEG NFR BLD AUTO: 60 % (ref 43–75)
NRBC BLD AUTO-RTO: 0 /100 WBCS
PLATELET # BLD AUTO: 153 THOUSANDS/UL (ref 149–390)
PMV BLD AUTO: 10.9 FL (ref 8.9–12.7)
POTASSIUM SERPL-SCNC: 4 MMOL/L (ref 3.5–5.3)
PROT SERPL-MCNC: 6.9 G/DL (ref 6.4–8.2)
RBC # BLD AUTO: 3.74 MILLION/UL (ref 3.88–5.62)
SODIUM SERPL-SCNC: 135 MMOL/L (ref 136–145)
WBC # BLD AUTO: 7.46 THOUSAND/UL (ref 4.31–10.16)

## 2019-03-14 PROCEDURE — 80053 COMPREHEN METABOLIC PANEL: CPT

## 2019-03-14 PROCEDURE — 85025 COMPLETE CBC W/AUTO DIFF WBC: CPT

## 2019-03-14 PROCEDURE — 83036 HEMOGLOBIN GLYCOSYLATED A1C: CPT

## 2019-03-14 PROCEDURE — 36415 COLL VENOUS BLD VENIPUNCTURE: CPT

## 2019-03-15 NOTE — PROGRESS NOTES
Assessment and Plan:  Problem List Items Addressed This Visit        Cardiovascular and Mediastinum    Coronary artery disease with angina pectoris ([facility] )      Other Visit Diagnoses     Medicare annual wellness visit, subsequent    -  Primary    Chronic diastolic heart failure due to valvular disease ([facility] )        Relevant Orders    Walker    Basic metabolic panel    Hypertension, unspecified type        Type 2 diabetes mellitus without complication, without long-term current use of insulin (HCC)        Abnormal gait        Relevant Orders    Walker    Chronic hyponatremia        Relevant Orders    Basic metabolic panel        Health Maintenance Due   Topic Date Due    BMI: Followup Plan  08/27/1953         HPI:  Patient Active Problem List   Diagnosis    Benign essential hypertension    History of aortic valve replacement    History of heart valve replacement with tissue graft    Hyperlipidemia    Multiple vessel coronary artery disease    Non-rheumatic mitral regurgitation    Peripheral edema    Aortic prosthetic valve regurgitation    Right bundle branch block with left anterior fascicular block    Chest pain in adult    Hyponatremia with excess extracellular fluid volume    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    Spinal stenosis of lumbar region without neurogenic claudication    Edema    Coronary artery disease with angina pectoris ([facility] )     Past Medical History:   Diagnosis Date    Aortic valve regurgitation 01/26/2017    Arthritis     Cancer ([facility] )     Coronary artery disease     Edema 12/02/2016    Heart disease     Heart valve disorder     Heart valve replaced     History of basal cell cancer     Hx of tissue graft 03/29/2017    Hyperlipemia 01/26/2017    Hypertension     Non-rheumatic mitral regurgitation 10/21/2016    Right bundle branch block (RBBB), 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     Past Surgical History:   Procedure Laterality Date    APPENDECTOMY      CARDIAC SURGERY      CARDIAC VALVE REPLACEMENT      EYE SURGERY      HERNIA REPAIR      VASECTOMY       Family History   Problem Relation Age of Onset    Diabetes Mother     Alcohol abuse Brother     Bipolar disorder Brother     Hypertension Brother     Kidney disease Brother     Heart disease Family     Diabetes type II Family      Social History     Tobacco Use   Smoking Status Former Smoker    Types: Cigarettes, Cigars   Smokeless Tobacco Former User   Tobacco Comment    quit 10 years ago, NEVER A SMOKER AS PER [employer]     Social History     Substance and Sexual Activity   Alcohol Use No      Social History     Substance and Sexual Activity   Drug Use No         Current Outpatient Medications   Medication Sig Dispense Refill    allopurinol (ZYLOPRIM) 100 mg tablet Take 100 mg by mouth daily  3    amLODIPine (NORVASC) 2 5 mg tablet Take 1 tablet (2 5 mg total) by mouth daily 90 tablet 1    atorvastatin (LIPITOR) 20 mg tablet Take 1 tablet (20 mg total) by mouth every 24 hours 90 tablet 3    Blood Glucose Monitoring Suppl (ONE TOUCH ULTRA 2) w/Device KIT by Does not apply route daily To test blood sugar 1x daily 1 each 0    Blood Glucose Monitoring Suppl KIT by Does not apply route daily Test once daily 1 each 0    bumetanide (BUMEX) 1 mg tablet Take 1 tablet (1 mg total) by mouth daily 90 tablet 3    doxazosin (CARDURA) 4 mg tablet Take 1 tablet (4 mg total) by mouth daily at bedtime 90 tablet 1    fluticasone (FLONASE) 50 mcg/act nasal spray SPRAY 1 SPRAY INTO EACH NOSTRIL TWICE DAILY  11    glucose blood (ONE TOUCH ULTRA TEST) test strip Use as instructed 100 each 6    glucose blood (ONE TOUCH ULTRA TEST) test strip To test blood sugar 1x daily 100 each 6    ipratropium (ATROVENT) 0 03 % nasal spray SPRAY 1 SPRAY INTO EACH NOSTRIL TWICE DAILY  11    losartan (COZAAR) 100 MG tablet Take 1 tablet (100 mg total) by mouth every 24 hours 90 tablet 3    metFORMIN (GLUCOPHAGE-XR) 500 mg 24 hr tablet Take 1 tablet (500 mg total) by mouth daily with dinner 90 tablet 3    nitroglycerin (NITROSTAT) 0 4 mg SL tablet Place 1 tablet (0 4 mg total) under the tongue every 5 (five) minutes as needed for chest pain 90 tablet 1    ONE TOUCH LANCETS MISC by Does not apply route daily To test blood sugar 1x daily 100 each 6    pantoprazole (PROTONIX) 40 mg tablet Take 1 tablet (40 mg total) by mouth daily at bedtime 90 tablet 1     No current facility-administered medications for this visit  Allergies   Allergen Reactions    Orphenadrine GI Intolerance     Immunization History   Administered Date(s) Administered    INFLUENZA 10/06/2016, 10/10/2017    Influenza Split High Dose Preservative Free IM 10/06/2016, 10/10/2017    Influenza, high dose seasonal 0 5 mL 10/05/2018    Pneumococcal Conjugate 13-Valent 01/26/2017       Patient Care Team:  Donal Bauer MD as PCP - General (Internal Medicine)    Medicare Screening Tests and Risk Assessments:  Tian Diaz is here for his Subsequent Wellness visit  Health Risk Assessment:  Patient rates overall health as fair  Patient feels that their physical health rating is Same  Eyesight was rated as Same  Hearing was rated as Same  Patient feels that their emotional and mental health rating is Same  Pain experienced by patient in the last 7 days has been None  Patient states that he has experienced no weight loss or gain in last 6 months  Emotional/Mental Health:  Patient has been feeling nervous/anxious  PHQ-9 Depression Screening:    Frequency of the following problems over the past two weeks:      1  Little interest or pleasure in doing things: 0 - not at all      2  Feeling down, depressed, or hopeless: 0 - not at all  PHQ-2 Score: 0          Broken Bones/Falls:     Fall Risk Assessment:    In the past year, patient has experienced: No history of falling in past year          Bladder/Bowel:  Patient has not leaked urine accidently in the last six months  Patient reports no loss of bowel control  Immunizations:  Patient has had a flu vaccination within the last year  Patient has received a pneumonia shot  Patient has received a shingles shot  Patient has not received tetanus/diphtheria shot  Home Safety:  Patient does not have trouble with stairs inside or outside of their home  Patient currently reports that there are no safety hazards present in home, working smoke alarms, working carbon monoxide detectors  Preventative Screenings:   no prostate cancer screen performed, no colon cancer screen completed, cholesterol screen completed, glaucoma eye exam completed,     Nutrition:  Current diet: Regular with servings of the following:    Medications:  Patient is not currently taking any over-the-counter supplements  Patient is able to manage medications  Lifestyle Choices:  Patient reports no tobacco use  Patient has not smoked or used tobacco in the past   Patient reports no alcohol use  Patient does not drive a vehicle  Patient does not wear seat belt  Activities of Daily Living:  Can get out of bed by his or her self, able to dress self, unable to make own meals, unable to do own shopping, able to bathe self, can do own laundry/housekeeping, can manage own money, pay bills and track expenses    Previous Hospitalizations:  No hospitalization or ED visit in past 12 months        Advanced Directives:  Patient has decided on a power of   Patient has spoken to designated power of   Patient has completed advanced directive          Preventative Screening/Counseling:      Cardiovascular:      General: Risks and Benefits Discussed and Screening Current      Counseling: Healthy Diet, Healthy Weight, Improve Cholesterol and Improve Blood Pressure          Diabetes:      General: Risks and Benefits Discussed and Screening Current      Counseling: Healthy Diet, Healthy Weight and Improve Physical Activity          Colorectal Cancer:      General: Risks and Benefits Discussed and Screening Not Indicated          Prostate Cancer:      General: Risks and Benefits Discussed and Patient Declines          Osteoporosis:      General: Risks and Benefits Discussed and Screening Not Indicated      Counseling: Calcium and Vitamin D Intake          AAA:      General: Risks and Benefits Discussed          Glaucoma:      General: Risks and Benefits Discussed and Screening Current          HIV:      General: Risks and Benefits Discussed and Screening Not Indicated          Hepatitis C:      General: Screening Not Indicated and Risks and Benefits Discussed        Advanced Directives:   Patient has living will for healthcare, has durable POA for healthcare, patient has an advanced directive  Information on ACP and/or AD provided  End of life assessment reviewed with patient  Provider agrees with end of life decisions   Immunizations:  Patient reviewed and up to date      Influenza: Risks & Benefits Discussed and Influenza UTD This Year      Pneumococcal: Risks & Benefits Discussed and Lifetime Vaccine Completed      Shingrix: Risks & Benefits Discussed      Hepatitis B (Low risk patients): Prevention Counseling and Series Not Indicated      Hepatitis B (Medium to high risk patients): Risks & Benefits Discussed and Patient Declines      Zostavax: Risks & Benefits Discussed and Patient Declines      TD: Risks & Benefits Discussed and Td Vaccine UTD      TDAP: Risks & Benefits Discussed and Patient Declines            No exam data present    Physical Exam:  Review of Systems   Gastrointestinal: Negative for bowel incontinence  Psychiatric/Behavioral: The patient is not nervous/anxious  Vitals:    03/19/19 1014   BP: 138/62   Pulse: 63   Resp: 17   Temp: 97 7 °F (36 5 °C)   SpO2: 93%   Weight: 103 kg (227 lb)   Height: 5' 8" (1 727 m)   Body mass index is 34 52 kg/m²      Physical Exam

## 2019-03-19 ENCOUNTER — OFFICE VISIT (OUTPATIENT)
Dept: FAMILY MEDICINE CLINIC | Facility: CLINIC | Age: 84
End: 2019-03-19
Payer: MEDICARE

## 2019-03-19 VITALS
RESPIRATION RATE: 17 BRPM | WEIGHT: 227 LBS | SYSTOLIC BLOOD PRESSURE: 138 MMHG | DIASTOLIC BLOOD PRESSURE: 62 MMHG | BODY MASS INDEX: 34.4 KG/M2 | HEART RATE: 63 BPM | OXYGEN SATURATION: 93 % | HEIGHT: 68 IN | TEMPERATURE: 97.7 F

## 2019-03-19 DIAGNOSIS — I50.32 CHRONIC DIASTOLIC HEART FAILURE DUE TO VALVULAR DISEASE (HCC): ICD-10-CM

## 2019-03-19 DIAGNOSIS — Z00.00 MEDICARE ANNUAL WELLNESS VISIT, SUBSEQUENT: Primary | ICD-10-CM

## 2019-03-19 DIAGNOSIS — I38 CHRONIC DIASTOLIC HEART FAILURE DUE TO VALVULAR DISEASE (HCC): ICD-10-CM

## 2019-03-19 DIAGNOSIS — E87.1 CHRONIC HYPONATREMIA: ICD-10-CM

## 2019-03-19 DIAGNOSIS — E11.9 TYPE 2 DIABETES MELLITUS WITHOUT COMPLICATION, WITHOUT LONG-TERM CURRENT USE OF INSULIN (HCC): ICD-10-CM

## 2019-03-19 DIAGNOSIS — I10 HYPERTENSION, UNSPECIFIED TYPE: ICD-10-CM

## 2019-03-19 DIAGNOSIS — R26.9 ABNORMAL GAIT: ICD-10-CM

## 2019-03-19 DIAGNOSIS — I25.119 CORONARY ARTERY DISEASE WITH ANGINA PECTORIS, UNSPECIFIED VESSEL OR LESION TYPE, UNSPECIFIED WHETHER NATIVE OR TRANSPLANTED HEART (HCC): ICD-10-CM

## 2019-03-19 PROCEDURE — 99214 OFFICE O/P EST MOD 30 MIN: CPT | Performed by: INTERNAL MEDICINE

## 2019-03-19 PROCEDURE — G0439 PPPS, SUBSEQ VISIT: HCPCS | Performed by: INTERNAL MEDICINE

## 2019-03-20 NOTE — PROGRESS NOTES
Assessment/Plan:      Diet reviewed  Lifestyle modifications reviewed  Medications reviewed and ordered  Laboratory tests and studies reviewed and ordered  All patient's questions answered to patient satisfaction  Diagnoses and all orders for this visit:    Medicare annual wellness visit, subsequent    Chronic diastolic heart failure due to valvular disease ([facility] )  -     Walker  -     Basic metabolic panel; Future    Hypertension, unspecified type    Type 2 diabetes mellitus without complication, without long-term current use of insulin ([facility])    Abnormal gait  -     Walker    Chronic hyponatremia  -     Basic metabolic panel; Future    Coronary artery disease with angina pectoris, unspecified vessel or lesion type, unspecified whether native or transplanted heart ([facility])        Subjective:      Patient ID: Vitaly Montanez is a 81 y o  male  HPI  This 27-year-old male seen for the following:    Chronic diastolic heart failure with prosthetic aortic valve functioning well    This is a tissue    Type 2 diabetes mellitus    Chronic hyponatremia sodium is now 135 with fluid restriction    Coronary artery disease without angina pectoris    Current Outpatient Medications:     allopurinol (ZYLOPRIM) 100 mg tablet, Take 100 mg by mouth daily, Disp: , Rfl: 3    amLODIPine (NORVASC) 2 5 mg tablet, Take 1 tablet (2 5 mg total) by mouth daily, Disp: 90 tablet, Rfl: 1    atorvastatin (LIPITOR) 20 mg tablet, Take 1 tablet (20 mg total) by mouth every 24 hours, Disp: 90 tablet, Rfl: 3    Blood Glucose Monitoring Suppl (ONE TOUCH ULTRA 2) w/Device KIT, by Does not apply route daily To test blood sugar 1x daily, Disp: 1 each, Rfl: 0    Blood Glucose Monitoring Suppl KIT, by Does not apply route daily Test once daily, Disp: 1 each, Rfl: 0    bumetanide (BUMEX) 1 mg tablet, Take 1 tablet (1 mg total) by mouth daily, Disp: 90 tablet, Rfl: 3    doxazosin (CARDURA) 4 mg tablet, Take 1 tablet (4 mg total) by mouth daily at bedtime, Disp: 90 tablet, Rfl: 1    fluticasone (FLONASE) 50 mcg/act nasal spray, SPRAY 1 SPRAY INTO EACH NOSTRIL TWICE DAILY, Disp: , Rfl: 11    glucose blood (ONE TOUCH ULTRA TEST) test strip, Use as instructed, Disp: 100 each, Rfl: 6    glucose blood (ONE TOUCH ULTRA TEST) test strip, To test blood sugar 1x daily, Disp: 100 each, Rfl: 6    ipratropium (ATROVENT) 0 03 % nasal spray, SPRAY 1 SPRAY INTO EACH NOSTRIL TWICE DAILY, Disp: , Rfl: 11    losartan (COZAAR) 100 MG tablet, Take 1 tablet (100 mg total) by mouth every 24 hours, Disp: 90 tablet, Rfl: 3    metFORMIN (GLUCOPHAGE-XR) 500 mg 24 hr tablet, Take 1 tablet (500 mg total) by mouth daily with dinner, Disp: 90 tablet, Rfl: 3    nitroglycerin (NITROSTAT) 0 4 mg SL tablet, Place 1 tablet (0 4 mg total) under the tongue every 5 (five) minutes as needed for chest pain, Disp: 90 tablet, Rfl: 1    ONE TOUCH LANCETS MISC, by Does not apply route daily To test blood sugar 1x daily, Disp: 100 each, Rfl: 6    pantoprazole (PROTONIX) 40 mg tablet, Take 1 tablet (40 mg total) by mouth daily at bedtime, Disp: 90 tablet, Rfl: 1    The following portions of the patient's history were reviewed and updated as appropriate: allergies, current medications, past family history, past medical history, past social history, past surgical history and problem list     Review of Systems   Constitutional: Negative for appetite change, fatigue, fever and unexpected weight change  HENT: Negative for rhinorrhea, sinus pressure, sinus pain, sneezing and sore throat  Eyes: Negative for visual disturbance  Respiratory: Negative for cough, chest tightness, shortness of breath and wheezing  Cardiovascular: Negative for chest pain, palpitations and leg swelling  Gastrointestinal: Negative for abdominal distention, abdominal pain, blood in stool, constipation, diarrhea, nausea and vomiting  Endocrine: Negative for polydipsia and polyuria     Genitourinary: Negative for decreased urine volume, difficulty urinating, dysuria, hematuria and urgency  Musculoskeletal: Negative for arthralgias, back pain, joint swelling and neck pain  Skin: Negative for rash  Allergic/Immunologic: Negative for environmental allergies  Neurological: Negative for tremors, weakness, light-headedness, numbness and headaches  Hematological: Does not bruise/bleed easily  Psychiatric/Behavioral: Negative for agitation, behavioral problems, confusion and dysphoric mood  The patient is not nervous/anxious            Family History   Problem Relation Age of Onset    Diabetes Mother     Alcohol abuse Brother     Bipolar disorder Brother     Hypertension Brother     Kidney disease Brother     Heart disease Family     Diabetes type II Family        Past Medical History:   Diagnosis Date    Aortic valve regurgitation 01/26/2017    Arthritis     Cancer ([facility] )     Coronary artery disease     Edema 12/02/2016    Heart disease     Heart valve disorder     Heart valve replaced     History of basal cell cancer     Hx of tissue graft 03/29/2017    Hyperlipemia 01/26/2017    Hypertension     Non-rheumatic mitral regurgitation 10/21/2016    Right bundle branch block (RBBB), 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       Past Surgical History:   Procedure Laterality Date    APPENDECTOMY      CARDIAC SURGERY      CARDIAC VALVE REPLACEMENT      EYE SURGERY      HERNIA REPAIR      VASECTOMY         Social History     Socioeconomic History    Marital status: /Civil Union     Spouse name: None    Number of children: 3    Years of education: None    Highest education level: None   Occupational History    Occupation: retired   Social Needs    Financial resource strain: None    Food insecurity:     Worry: None     Inability: None    Transportation needs:     Medical: None     Non-medical: None   Tobacco Use    Smoking status: Former Smoker Types: Cigarettes, Cigars    Smokeless tobacco: Former User    Tobacco comment: quit 10 years ago, NEVER A SMOKER AS PER [employer]   Substance and Sexual Activity    Alcohol use: No    Drug use: No    Sexual activity: Yes     Partners: Female   Lifestyle    Physical activity:     Days per week: None     Minutes per session: None    Stress: None   Relationships    Social connections:     Talks on phone: None     Gets together: None     Attends Yarsanism service: None     Active member of club or organization: None     Attends meetings of clubs or organizations: None     Relationship status: None    Intimate partner violence:     Fear of current or ex partner: None     Emotionally abused: None     Physically abused: None     Forced sexual activity: None   Other Topics Concern    None   Social History Narrative    PT states he does not drink any caffeine        Allergies   Allergen Reactions    Orphenadrine GI Intolerance         Objective:      /62   Pulse 63   Temp 97 7 °F (36 5 °C)   Resp 17   Ht 5' 8" (1 727 m)   Wt 103 kg (227 lb)   SpO2 93%   BMI 34 52 kg/m²        Physical Exam   Constitutional: He is oriented to person, place, and time  He appears well-developed and well-nourished  No distress  HENT:   Head: Normocephalic and atraumatic  Nose: Nose normal    Mouth/Throat: Oropharynx is clear and moist  No oropharyngeal exudate  Eyes: Pupils are equal, round, and reactive to light  Conjunctivae and EOM are normal  No scleral icterus  Neck: Normal range of motion  Neck supple  No JVD present  Carotid bruit is present (Transmitted bilateral)  No tracheal deviation present  No thyromegaly present  Cardiovascular: Normal rate and regular rhythm  Exam reveals no gallop and no friction rub  Murmur heard  Crescendo decrescendo systolic murmur is present with a grade of 3/6  Pulmonary/Chest: Effort normal  No respiratory distress  He has no wheezes  He has no rales   He exhibits no tenderness  Abdominal: Soft  Bowel sounds are normal  He exhibits no distension and no mass  There is no tenderness  There is no rebound and no guarding  Musculoskeletal: Normal range of motion  He exhibits no edema or deformity  Lymphadenopathy:     He has no cervical adenopathy  Neurological: He is alert and oriented to person, place, and time  No cranial nerve deficit  Coordination normal    Skin: Skin is warm and dry  No rash noted  Psychiatric: He has a normal mood and affect   His behavior is normal  Judgment and thought content normal

## 2019-03-27 ENCOUNTER — TELEPHONE (OUTPATIENT)
Dept: FAMILY MEDICINE CLINIC | Facility: CLINIC | Age: 84
End: 2019-03-27

## 2019-05-10 ENCOUNTER — APPOINTMENT (OUTPATIENT)
Dept: LAB | Age: 84
End: 2019-05-10
Payer: MEDICARE

## 2019-05-10 DIAGNOSIS — I50.32 CHRONIC DIASTOLIC HEART FAILURE DUE TO VALVULAR DISEASE (HCC): ICD-10-CM

## 2019-05-10 DIAGNOSIS — E87.1 CHRONIC HYPONATREMIA: ICD-10-CM

## 2019-05-10 DIAGNOSIS — I38 CHRONIC DIASTOLIC HEART FAILURE DUE TO VALVULAR DISEASE (HCC): ICD-10-CM

## 2019-05-10 LAB
ANION GAP SERPL CALCULATED.3IONS-SCNC: 7 MMOL/L (ref 4–13)
BUN SERPL-MCNC: 26 MG/DL (ref 5–25)
CALCIUM SERPL-MCNC: 8.7 MG/DL (ref 8.3–10.1)
CHLORIDE SERPL-SCNC: 102 MMOL/L (ref 100–108)
CO2 SERPL-SCNC: 28 MMOL/L (ref 21–32)
CREAT SERPL-MCNC: 1.24 MG/DL (ref 0.6–1.3)
GFR SERPL CREATININE-BSD FRML MDRD: 54 ML/MIN/1.73SQ M
GLUCOSE P FAST SERPL-MCNC: 114 MG/DL (ref 65–99)
POTASSIUM SERPL-SCNC: 4.2 MMOL/L (ref 3.5–5.3)
SODIUM SERPL-SCNC: 137 MMOL/L (ref 136–145)

## 2019-05-10 PROCEDURE — 80048 BASIC METABOLIC PNL TOTAL CA: CPT

## 2019-05-10 PROCEDURE — 36415 COLL VENOUS BLD VENIPUNCTURE: CPT

## 2019-05-14 ENCOUNTER — OFFICE VISIT (OUTPATIENT)
Dept: FAMILY MEDICINE CLINIC | Facility: CLINIC | Age: 84
End: 2019-05-14
Payer: MEDICARE

## 2019-05-14 VITALS
DIASTOLIC BLOOD PRESSURE: 60 MMHG | TEMPERATURE: 97.3 F | HEIGHT: 68 IN | OXYGEN SATURATION: 96 % | SYSTOLIC BLOOD PRESSURE: 158 MMHG | HEART RATE: 67 BPM | WEIGHT: 227 LBS | RESPIRATION RATE: 17 BRPM | BODY MASS INDEX: 34.4 KG/M2

## 2019-05-14 DIAGNOSIS — I10 HYPERTENSION, UNSPECIFIED TYPE: ICD-10-CM

## 2019-05-14 DIAGNOSIS — Z23 ENCOUNTER FOR VACCINATION: ICD-10-CM

## 2019-05-14 DIAGNOSIS — I25.119 CORONARY ARTERY DISEASE WITH ANGINA PECTORIS, UNSPECIFIED VESSEL OR LESION TYPE, UNSPECIFIED WHETHER NATIVE OR TRANSPLANTED HEART (HCC): ICD-10-CM

## 2019-05-14 DIAGNOSIS — E11.9 TYPE 2 DIABETES MELLITUS WITHOUT COMPLICATION, WITHOUT LONG-TERM CURRENT USE OF INSULIN (HCC): ICD-10-CM

## 2019-05-14 DIAGNOSIS — I50.32 CHRONIC DIASTOLIC HEART FAILURE DUE TO VALVULAR DISEASE (HCC): Primary | ICD-10-CM

## 2019-05-14 DIAGNOSIS — I38 CHRONIC DIASTOLIC HEART FAILURE DUE TO VALVULAR DISEASE (HCC): Primary | ICD-10-CM

## 2019-05-14 PROCEDURE — 3044F HG A1C LEVEL LT 7.0%: CPT | Performed by: INTERNAL MEDICINE

## 2019-05-14 PROCEDURE — 1160F RVW MEDS BY RX/DR IN RCRD: CPT | Performed by: INTERNAL MEDICINE

## 2019-05-14 PROCEDURE — 99214 OFFICE O/P EST MOD 30 MIN: CPT | Performed by: INTERNAL MEDICINE

## 2019-05-24 DIAGNOSIS — I10 HYPERTENSION, UNSPECIFIED TYPE: ICD-10-CM

## 2019-05-24 RX ORDER — LOSARTAN POTASSIUM 100 MG/1
100 TABLET ORAL EVERY 24 HOURS
Qty: 90 TABLET | Refills: 3 | Status: SHIPPED | OUTPATIENT
Start: 2019-05-24 | End: 2019-11-29 | Stop reason: SDUPTHER

## 2019-05-24 RX ORDER — ALLOPURINOL 100 MG/1
100 TABLET ORAL DAILY
Qty: 90 TABLET | Refills: 1 | Status: SHIPPED | OUTPATIENT
Start: 2019-05-24 | End: 2019-07-02 | Stop reason: SDUPTHER

## 2019-05-31 LAB
LEFT EYE DIABETIC RETINOPATHY: NORMAL
RIGHT EYE DIABETIC RETINOPATHY: NORMAL

## 2019-06-03 ENCOUNTER — TELEPHONE (OUTPATIENT)
Dept: FAMILY MEDICINE CLINIC | Facility: CLINIC | Age: 84
End: 2019-06-03

## 2019-06-04 ENCOUNTER — TELEPHONE (OUTPATIENT)
Dept: FAMILY MEDICINE CLINIC | Facility: CLINIC | Age: 84
End: 2019-06-04

## 2019-06-28 ENCOUNTER — APPOINTMENT (OUTPATIENT)
Dept: LAB | Age: 84
End: 2019-06-28
Payer: MEDICARE

## 2019-06-28 DIAGNOSIS — I10 HYPERTENSION, UNSPECIFIED TYPE: ICD-10-CM

## 2019-06-28 DIAGNOSIS — I38 CHRONIC DIASTOLIC HEART FAILURE DUE TO VALVULAR DISEASE (HCC): ICD-10-CM

## 2019-06-28 DIAGNOSIS — E11.9 TYPE 2 DIABETES MELLITUS WITHOUT COMPLICATION, WITHOUT LONG-TERM CURRENT USE OF INSULIN (HCC): ICD-10-CM

## 2019-06-28 DIAGNOSIS — I50.32 CHRONIC DIASTOLIC HEART FAILURE DUE TO VALVULAR DISEASE (HCC): ICD-10-CM

## 2019-06-28 LAB
ALBUMIN SERPL BCP-MCNC: 3.3 G/DL (ref 3.5–5)
ALP SERPL-CCNC: 69 U/L (ref 46–116)
ALT SERPL W P-5'-P-CCNC: 38 U/L (ref 12–78)
ANION GAP SERPL CALCULATED.3IONS-SCNC: 5 MMOL/L (ref 4–13)
AST SERPL W P-5'-P-CCNC: 33 U/L (ref 5–45)
BASOPHILS # BLD AUTO: 0.06 THOUSANDS/ΜL (ref 0–0.1)
BASOPHILS NFR BLD AUTO: 1 % (ref 0–1)
BILIRUB SERPL-MCNC: 0.69 MG/DL (ref 0.2–1)
BUN SERPL-MCNC: 21 MG/DL (ref 5–25)
CALCIUM SERPL-MCNC: 8.9 MG/DL (ref 8.3–10.1)
CHLORIDE SERPL-SCNC: 100 MMOL/L (ref 100–108)
CO2 SERPL-SCNC: 29 MMOL/L (ref 21–32)
CREAT SERPL-MCNC: 1.11 MG/DL (ref 0.6–1.3)
EOSINOPHIL # BLD AUTO: 0.51 THOUSAND/ΜL (ref 0–0.61)
EOSINOPHIL NFR BLD AUTO: 6 % (ref 0–6)
ERYTHROCYTE [DISTWIDTH] IN BLOOD BY AUTOMATED COUNT: 12.7 % (ref 11.6–15.1)
EST. AVERAGE GLUCOSE BLD GHB EST-MCNC: 134 MG/DL
GFR SERPL CREATININE-BSD FRML MDRD: 62 ML/MIN/1.73SQ M
GLUCOSE P FAST SERPL-MCNC: 104 MG/DL (ref 65–99)
HBA1C MFR BLD: 6.3 % (ref 4.2–6.3)
HCT VFR BLD AUTO: 37.9 % (ref 36.5–49.3)
HGB BLD-MCNC: 12.4 G/DL (ref 12–17)
IMM GRANULOCYTES # BLD AUTO: 0.06 THOUSAND/UL (ref 0–0.2)
IMM GRANULOCYTES NFR BLD AUTO: 1 % (ref 0–2)
LYMPHOCYTES # BLD AUTO: 2.35 THOUSANDS/ΜL (ref 0.6–4.47)
LYMPHOCYTES NFR BLD AUTO: 26 % (ref 14–44)
MCH RBC QN AUTO: 32.9 PG (ref 26.8–34.3)
MCHC RBC AUTO-ENTMCNC: 32.7 G/DL (ref 31.4–37.4)
MCV RBC AUTO: 101 FL (ref 82–98)
MONOCYTES # BLD AUTO: 0.88 THOUSAND/ΜL (ref 0.17–1.22)
MONOCYTES NFR BLD AUTO: 10 % (ref 4–12)
NEUTROPHILS # BLD AUTO: 5.22 THOUSANDS/ΜL (ref 1.85–7.62)
NEUTS SEG NFR BLD AUTO: 56 % (ref 43–75)
NRBC BLD AUTO-RTO: 0 /100 WBCS
PLATELET # BLD AUTO: 160 THOUSANDS/UL (ref 149–390)
PMV BLD AUTO: 10.5 FL (ref 8.9–12.7)
POTASSIUM SERPL-SCNC: 4 MMOL/L (ref 3.5–5.3)
PROT SERPL-MCNC: 7 G/DL (ref 6.4–8.2)
RBC # BLD AUTO: 3.77 MILLION/UL (ref 3.88–5.62)
SODIUM SERPL-SCNC: 134 MMOL/L (ref 136–145)
WBC # BLD AUTO: 9.08 THOUSAND/UL (ref 4.31–10.16)

## 2019-06-28 PROCEDURE — 85025 COMPLETE CBC W/AUTO DIFF WBC: CPT

## 2019-06-28 PROCEDURE — 36415 COLL VENOUS BLD VENIPUNCTURE: CPT

## 2019-06-28 PROCEDURE — 83036 HEMOGLOBIN GLYCOSYLATED A1C: CPT

## 2019-06-28 PROCEDURE — 80053 COMPREHEN METABOLIC PANEL: CPT

## 2019-07-02 ENCOUNTER — OFFICE VISIT (OUTPATIENT)
Dept: FAMILY MEDICINE CLINIC | Facility: CLINIC | Age: 84
End: 2019-07-02
Payer: MEDICARE

## 2019-07-02 VITALS
SYSTOLIC BLOOD PRESSURE: 132 MMHG | HEART RATE: 76 BPM | HEIGHT: 68 IN | WEIGHT: 227 LBS | OXYGEN SATURATION: 96 % | BODY MASS INDEX: 34.4 KG/M2 | DIASTOLIC BLOOD PRESSURE: 74 MMHG

## 2019-07-02 DIAGNOSIS — I10 HYPERTENSION, UNSPECIFIED TYPE: ICD-10-CM

## 2019-07-02 DIAGNOSIS — E78.5 HYPERLIPIDEMIA, UNSPECIFIED HYPERLIPIDEMIA TYPE: Primary | ICD-10-CM

## 2019-07-02 DIAGNOSIS — E11.9 TYPE 2 DIABETES MELLITUS WITHOUT COMPLICATION, WITHOUT LONG-TERM CURRENT USE OF INSULIN (HCC): ICD-10-CM

## 2019-07-02 PROCEDURE — 99214 OFFICE O/P EST MOD 30 MIN: CPT | Performed by: INTERNAL MEDICINE

## 2019-07-02 RX ORDER — ALLOPURINOL 100 MG/1
100 TABLET ORAL DAILY
Qty: 90 TABLET | Refills: 1 | Status: SHIPPED | OUTPATIENT
Start: 2019-07-02 | End: 2019-11-29 | Stop reason: SDUPTHER

## 2019-07-02 NOTE — PROGRESS NOTES
Assessment/Plan:  Patient's diabetes well controlled controlled with a hemoglobin A1c of 6 6 on metformin 500 mg daily  Sodium is 134 and the patient's edema is only +1 to +2 at the ankles and appears well controlled  He is very careful of his fluid intake  Congestive heart failure appears well controlled  Renal function numbers are much improved  Diet reviewed  Lifestyle modifications reviewed  Medications reviewed and ordered  Laboratory tests and studies reviewed and ordered  All patient's questions answered to patient satisfaction  Chief Complaint   Patient presents with    Diabetes     pt reports BS reading have been [id number] [address] ( not great but OK)    Hypertension     pt reports BP reading have been doing well    Labs Only     done 6/28/2019         Diagnoses and all orders for this visit:    Hyperlipidemia, unspecified hyperlipidemia type    Hypertension, unspecified type  -     allopurinol (ZYLOPRIM) 100 mg tablet; Take 1 tablet (100 mg total) by mouth daily    Type 2 diabetes mellitus without complication, without long-term current use of insulin ([facility])        Subjective: This 81-year-old male states that he is just not having much fun any more since he has to so closely watch his diet and fluid intake  However his sodium now is 134 and his renal function is excellent  His hemoglobin A1c is 6 6 on only 500 mg of metformin daily  His only vice is that occasionally his wife makes him apple sauce cake with dark shot chocolate in it  He denies shortness of breath or chest pain or fever  Patient ID: Juanito Abreu is a 81 y o  male          Current Outpatient Medications:     allopurinol (ZYLOPRIM) 100 mg tablet, Take 1 tablet (100 mg total) by mouth daily, Disp: 90 tablet, Rfl: 1    amLODIPine (NORVASC) 2 5 mg tablet, Take 1 tablet (2 5 mg total) by mouth daily, Disp: 90 tablet, Rfl: 1    atorvastatin (LIPITOR) 20 mg tablet, Take 1 tablet (20 mg total) by mouth every 24 hours, Disp: 90 tablet, Rfl: 3    Blood Glucose Monitoring Suppl (ONE TOUCH ULTRA 2) w/Device KIT, by Does not apply route daily To test blood sugar 1x daily, Disp: 1 each, Rfl: 0    Blood Glucose Monitoring Suppl KIT, by Does not apply route daily Test once daily, Disp: 1 each, Rfl: 0    bumetanide (BUMEX) 1 mg tablet, Take 1 tablet (1 mg total) by mouth daily, Disp: 90 tablet, Rfl: 3    doxazosin (CARDURA) 4 mg tablet, Take 1 tablet (4 mg total) by mouth daily at bedtime, Disp: 90 tablet, Rfl: 1    fluticasone (FLONASE) 50 mcg/act nasal spray, SPRAY 1 SPRAY INTO EACH NOSTRIL TWICE DAILY, Disp: , Rfl: 11    glucose blood (ONE TOUCH ULTRA TEST) test strip, Use as instructed, Disp: 100 each, Rfl: 6    glucose blood (ONE TOUCH ULTRA TEST) test strip, To test blood sugar 1x daily, Disp: 100 each, Rfl: 6    ipratropium (ATROVENT) 0 03 % nasal spray, SPRAY 1 SPRAY INTO EACH NOSTRIL TWICE DAILY, Disp: , Rfl: 11    losartan (COZAAR) 100 MG tablet, Take 1 tablet (100 mg total) by mouth every 24 hours, Disp: 90 tablet, Rfl: 3    metFORMIN (GLUCOPHAGE-XR) 500 mg 24 hr tablet, Take 1 tablet (500 mg total) by mouth daily with dinner, Disp: 90 tablet, Rfl: 3    nitroglycerin (NITROSTAT) 0 4 mg SL tablet, Place 1 tablet (0 4 mg total) under the tongue every 5 (five) minutes as needed for chest pain, Disp: 90 tablet, Rfl: 1    ONE TOUCH LANCETS MISC, by Does not apply route daily To test blood sugar 1x daily, Disp: 100 each, Rfl: 6    pantoprazole (PROTONIX) 40 mg tablet, Take 1 tablet (40 mg total) by mouth daily at bedtime, Disp: 90 tablet, Rfl: 1    The following portions of the patient's history were reviewed and updated as appropriate: allergies, current medications, past family history, past medical history, past social history, past surgical history and problem list     Review of Systems   Constitutional: Negative for appetite change, fatigue, fever and unexpected weight change     HENT: Negative for rhinorrhea, sinus pressure, sinus pain, sneezing and sore throat  Eyes: Negative for visual disturbance  Respiratory: Negative for cough, chest tightness, shortness of breath and wheezing  Cardiovascular: Negative for chest pain, palpitations and leg swelling  Gastrointestinal: Negative for abdominal distention, abdominal pain, blood in stool, constipation, diarrhea, nausea and vomiting  Endocrine: Negative for polydipsia and polyuria  Genitourinary: Negative for decreased urine volume, difficulty urinating, dysuria, hematuria and urgency  Musculoskeletal: Negative for arthralgias, back pain, joint swelling and neck pain  Skin: Negative for rash  Allergic/Immunologic: Negative for environmental allergies  Neurological: Negative for tremors, weakness, light-headedness, numbness and headaches  Hematological: Does not bruise/bleed easily  Psychiatric/Behavioral: Negative for agitation, behavioral problems, confusion and dysphoric mood  The patient is not nervous/anxious            Family History   Problem Relation Age of Onset    Diabetes Mother     Alcohol abuse Brother     Bipolar disorder Brother     Hypertension Brother     Kidney disease Brother     Heart disease Family     Diabetes type II Family        Past Medical History:   Diagnosis Date    Aortic valve regurgitation 01/26/2017    Arthritis     Cancer ([address] )     Coronary artery disease     Edema 12/02/2016    Heart disease     Heart valve disorder     Heart valve replaced     History of basal cell cancer     Hx of tissue graft 03/29/2017    Hyperlipemia 01/26/2017    Hypertension     Non-rheumatic mitral regurgitation 10/21/2016    Right bundle branch block (RBBB), 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       Past Surgical History:   Procedure Laterality Date    APPENDECTOMY      CARDIAC SURGERY      CARDIAC VALVE REPLACEMENT      EYE SURGERY      HERNIA REPAIR      VASECTOMY         Social History     Socioeconomic History    Marital status: /Civil Union     Spouse name: None    Number of children: 3    Years of education: None    Highest education level: None   Occupational History    Occupation: retired   Social Needs    Financial resource strain: None    Food insecurity:     Worry: None     Inability: None    Transportation needs:     Medical: None     Non-medical: None   Tobacco Use    Smoking status: Former Smoker     Types: Cigarettes, Cigars    Smokeless tobacco: Former User    Tobacco comment: quit 10 years ago, NEVER A SMOKER AS PER [employer]   Substance and Sexual Activity    Alcohol use: No    Drug use: No    Sexual activity: Yes     Partners: Female   Lifestyle    Physical activity:     Days per week: None     Minutes per session: None    Stress: None   Relationships    Social connections:     Talks on phone: None     Gets together: None     Attends Holiness service: None     Active member of club or organization: None     Attends meetings of clubs or organizations: None     Relationship status: None    Intimate partner violence:     Fear of current or ex partner: None     Emotionally abused: None     Physically abused: None     Forced sexual activity: None   Other Topics Concern    None   Social History Narrative    PT states he does not drink any caffeine        Allergies   Allergen Reactions    Orphenadrine GI Intolerance       BMI Counseling: Body mass index is 34 52 kg/m²  Discussed the patient's BMI with him  The BMI is above average  BMI counseling and education was provided to the patient  Nutrition recommendations include reducing portion sizes  Objective:    /74 (BP Location: Right arm, Patient Position: Sitting, Cuff Size: Standard)   Pulse 76   Ht 5' 8" (1 727 m)   Wt 103 kg (227 lb)   SpO2 96%   BMI 34 52 kg/m²        Physical Exam   Constitutional: He is oriented to person, place, and time   He appears well-developed and well-nourished  No distress  HENT:   Head: Normocephalic and atraumatic  Nose: Nose normal    Mouth/Throat: Oropharynx is clear and moist  No oropharyngeal exudate  Eyes: Pupils are equal, round, and reactive to light  Conjunctivae and EOM are normal  No scleral icterus  Neck: Normal range of motion  Neck supple  No JVD present  No tracheal deviation present  No thyromegaly present  Cardiovascular: Normal rate and regular rhythm  Exam reveals no gallop and no friction rub  Murmur heard  Pulmonary/Chest: Effort normal  No respiratory distress  He has no wheezes  He has no rales  He exhibits no tenderness  Abdominal: Soft  Bowel sounds are normal  He exhibits no distension and no mass  There is no tenderness  There is no rebound and no guarding  Musculoskeletal: Normal range of motion  He exhibits edema  He exhibits no deformity  Lymphadenopathy:     He has no cervical adenopathy  Neurological: He is alert and oriented to person, place, and time  No cranial nerve deficit  Coordination normal    Skin: Skin is warm and dry  No rash noted  Psychiatric: He has a normal mood and affect   His behavior is normal  Judgment and thought content normal

## 2019-07-10 DIAGNOSIS — I50.32 CHRONIC DIASTOLIC HEART FAILURE DUE TO VALVULAR DISEASE (HCC): ICD-10-CM

## 2019-07-10 DIAGNOSIS — I38 CHRONIC DIASTOLIC HEART FAILURE DUE TO VALVULAR DISEASE (HCC): ICD-10-CM

## 2019-07-10 RX ORDER — AMLODIPINE BESYLATE 2.5 MG/1
2.5 TABLET ORAL DAILY
Qty: 90 TABLET | Refills: 1 | Status: SHIPPED | OUTPATIENT
Start: 2019-07-10 | End: 2019-11-29 | Stop reason: SDUPTHER

## 2019-08-26 ENCOUNTER — APPOINTMENT (OUTPATIENT)
Dept: LAB | Age: 84
End: 2019-08-26
Payer: MEDICARE

## 2019-08-26 DIAGNOSIS — E11.9 TYPE 2 DIABETES MELLITUS WITHOUT COMPLICATION, WITHOUT LONG-TERM CURRENT USE OF INSULIN (HCC): ICD-10-CM

## 2019-08-26 DIAGNOSIS — K21.00 GERD WITH ESOPHAGITIS: ICD-10-CM

## 2019-08-26 DIAGNOSIS — I10 HYPERTENSION, UNSPECIFIED TYPE: ICD-10-CM

## 2019-08-26 DIAGNOSIS — I38 CHRONIC DIASTOLIC HEART FAILURE DUE TO VALVULAR DISEASE (HCC): ICD-10-CM

## 2019-08-26 DIAGNOSIS — I50.32 CHRONIC DIASTOLIC HEART FAILURE DUE TO VALVULAR DISEASE (HCC): ICD-10-CM

## 2019-08-26 DIAGNOSIS — E78.5 HYPERLIPIDEMIA, UNSPECIFIED HYPERLIPIDEMIA TYPE: ICD-10-CM

## 2019-08-26 LAB
ALBUMIN SERPL BCP-MCNC: 3.5 G/DL (ref 3.5–5)
ALP SERPL-CCNC: 73 U/L (ref 46–116)
ALT SERPL W P-5'-P-CCNC: 38 U/L (ref 12–78)
ANION GAP SERPL CALCULATED.3IONS-SCNC: 8 MMOL/L (ref 4–13)
AST SERPL W P-5'-P-CCNC: 36 U/L (ref 5–45)
BASOPHILS # BLD AUTO: 0.05 THOUSANDS/ΜL (ref 0–0.1)
BASOPHILS NFR BLD AUTO: 1 % (ref 0–1)
BILIRUB SERPL-MCNC: 0.59 MG/DL (ref 0.2–1)
BUN SERPL-MCNC: 23 MG/DL (ref 5–25)
CALCIUM SERPL-MCNC: 9.1 MG/DL (ref 8.3–10.1)
CHLORIDE SERPL-SCNC: 102 MMOL/L (ref 100–108)
CO2 SERPL-SCNC: 26 MMOL/L (ref 21–32)
CREAT SERPL-MCNC: 1.14 MG/DL (ref 0.6–1.3)
EOSINOPHIL # BLD AUTO: 0.42 THOUSAND/ΜL (ref 0–0.61)
EOSINOPHIL NFR BLD AUTO: 5 % (ref 0–6)
ERYTHROCYTE [DISTWIDTH] IN BLOOD BY AUTOMATED COUNT: 12.7 % (ref 11.6–15.1)
GFR SERPL CREATININE-BSD FRML MDRD: 60 ML/MIN/1.73SQ M
GLUCOSE P FAST SERPL-MCNC: 114 MG/DL (ref 65–99)
HCT VFR BLD AUTO: 37.3 % (ref 36.5–49.3)
HGB BLD-MCNC: 12.3 G/DL (ref 12–17)
IMM GRANULOCYTES # BLD AUTO: 0.04 THOUSAND/UL (ref 0–0.2)
IMM GRANULOCYTES NFR BLD AUTO: 1 % (ref 0–2)
LYMPHOCYTES # BLD AUTO: 1.91 THOUSANDS/ΜL (ref 0.6–4.47)
LYMPHOCYTES NFR BLD AUTO: 23 % (ref 14–44)
MCH RBC QN AUTO: 33.1 PG (ref 26.8–34.3)
MCHC RBC AUTO-ENTMCNC: 33 G/DL (ref 31.4–37.4)
MCV RBC AUTO: 100 FL (ref 82–98)
MONOCYTES # BLD AUTO: 0.76 THOUSAND/ΜL (ref 0.17–1.22)
MONOCYTES NFR BLD AUTO: 9 % (ref 4–12)
NEUTROPHILS # BLD AUTO: 5.26 THOUSANDS/ΜL (ref 1.85–7.62)
NEUTS SEG NFR BLD AUTO: 61 % (ref 43–75)
NRBC BLD AUTO-RTO: 0 /100 WBCS
PLATELET # BLD AUTO: 168 THOUSANDS/UL (ref 149–390)
PMV BLD AUTO: 10.1 FL (ref 8.9–12.7)
POTASSIUM SERPL-SCNC: 4.1 MMOL/L (ref 3.5–5.3)
PROT SERPL-MCNC: 7.2 G/DL (ref 6.4–8.2)
RBC # BLD AUTO: 3.72 MILLION/UL (ref 3.88–5.62)
SODIUM SERPL-SCNC: 136 MMOL/L (ref 136–145)
WBC # BLD AUTO: 8.44 THOUSAND/UL (ref 4.31–10.16)

## 2019-08-26 PROCEDURE — 36415 COLL VENOUS BLD VENIPUNCTURE: CPT

## 2019-08-26 PROCEDURE — 85025 COMPLETE CBC W/AUTO DIFF WBC: CPT

## 2019-08-26 PROCEDURE — 80053 COMPREHEN METABOLIC PANEL: CPT

## 2019-08-26 RX ORDER — DOXAZOSIN MESYLATE 4 MG/1
4 TABLET ORAL
Qty: 90 TABLET | Refills: 1 | Status: SHIPPED | OUTPATIENT
Start: 2019-08-26 | End: 2019-11-29 | Stop reason: SDUPTHER

## 2019-08-28 NOTE — PROGRESS NOTES
Assessment/Plan:  The patient and his wife work very hard on controlling his dietary intake of water salt and sugar  This has resulted in controlled +2 ankle edema, controlled sodium of 136, and BUN creatinine and GFR other close to normal   He is very limited in his physical activity due to back pain and sciatica and is sedentary  He has no symptomatic dyspnea  Diastolic congestive heart failure is controlled  Hyponatremia is controlled  Renal function is stable  Diabetes is well controlled with metformin 500 mg once daily  Patient will take 160 mg of aspirin extra if necessary for his back pain  He is on pantoprazole 40 mg daily for prophylaxis of GI side effects from aspirin  Normally takes 81 mg daily  Diet reviewed  Lifestyle modifications reviewed  Medications reviewed and ordered  Laboratory tests and studies reviewed and ordered  All patient's questions answered to patient satisfaction  Chief Complaint   Patient presents with    Follow-up     pt presents for a rtn f/u  pt reports doing well with no changes    Hypertension     pt reports BP has been good    Diabetes     pt reports BS reading has been good    Labs Only     8/26/2019         Diagnoses and all orders for this visit:    Chronic diastolic heart failure due to valvular disease ([facility] [address]  -     Comprehensive metabolic panel; Future  -     CBC and differential; Future  -     Hemoglobin A1C; Future  -     LDL cholesterol, direct; Future    Hypertension, unspecified type    Hyperlipidemia, unspecified hyperlipidemia type  -     Comprehensive metabolic panel; Future  -     CBC and differential; Future  -     Hemoglobin A1C; Future  -     LDL cholesterol, direct; Future    Type 2 diabetes mellitus without complication, without long-term current use of insulin ([facility])  -     Comprehensive metabolic panel; Future  -     CBC and differential; Future  -     Hemoglobin A1C; Future  -     LDL cholesterol, direct;  Future    Aortic prosthetic valve regurgitation, sequela    History of aortic valve replacement    Hyponatremia with excess extracellular fluid volume    Peripheral edema    Multiple vessel coronary artery disease    Chronic diastolic congestive heart failure, NYHA class 2 (HCC)        Subjective: This 25-year-old male seen for the following:    Chronic diastolic congestive heart failure with chronic edema steadily improving with stable weight and minimal edema with close attention to his restrict her oral intake and restrict his salt intake  Past biologic aortic valve replacement    Long-term anti-platelet therapy    Chronic low back pain and sciatica in the right leg the    Well controlled type 2 diabetes mellitus    Hyponatremia secondary to increased oral fluid intake and chronic congestive heart failure with recent sodium of 136 and GFR of 59  Patient ID: Alexa Uriostegui is a 81 y o  male          Current Outpatient Medications:     allopurinol (ZYLOPRIM) 100 mg tablet, Take 1 tablet (100 mg total) by mouth daily, Disp: 90 tablet, Rfl: 1    amLODIPine (NORVASC) 2 5 mg tablet, Take 1 tablet (2 5 mg total) by mouth daily, Disp: 90 tablet, Rfl: 1    atorvastatin (LIPITOR) 20 mg tablet, Take 1 tablet (20 mg total) by mouth every 24 hours, Disp: 90 tablet, Rfl: 3    Blood Glucose Monitoring Suppl (ONE TOUCH ULTRA 2) w/Device KIT, by Does not apply route daily To test blood sugar 1x daily, Disp: 1 each, Rfl: 0    Blood Glucose Monitoring Suppl KIT, by Does not apply route daily Test once daily, Disp: 1 each, Rfl: 0    bumetanide (BUMEX) 1 mg tablet, Take 1 tablet (1 mg total) by mouth daily, Disp: 90 tablet, Rfl: 3    doxazosin (CARDURA) 4 mg tablet, Take 1 tablet (4 mg total) by mouth daily at bedtime, Disp: 90 tablet, Rfl: 1    fluticasone (FLONASE) 50 mcg/act nasal spray, SPRAY 1 SPRAY INTO EACH NOSTRIL TWICE DAILY, Disp: , Rfl: 11    glucose blood (ONE TOUCH ULTRA TEST) test strip, Use as instructed, Disp: 100 each, Rfl: 6   glucose blood (ONE TOUCH ULTRA TEST) test strip, To test blood sugar 1x daily, Disp: 100 each, Rfl: 6    ipratropium (ATROVENT) 0 03 % nasal spray, SPRAY 1 SPRAY INTO EACH NOSTRIL TWICE DAILY, Disp: , Rfl: 11    losartan (COZAAR) 100 MG tablet, Take 1 tablet (100 mg total) by mouth every 24 hours, Disp: 90 tablet, Rfl: 3    metFORMIN (GLUCOPHAGE-XR) 500 mg 24 hr tablet, Take 1 tablet (500 mg total) by mouth daily with dinner, Disp: 90 tablet, Rfl: 3    mometasone (ELOCON) 0 1 % lotion, One drop affected ear canal daily at bedtime when necessary itching, Disp: 60 mL, Rfl: 0    nitroglycerin (NITROSTAT) 0 4 mg SL tablet, Place 1 tablet (0 4 mg total) under the tongue every 5 (five) minutes as needed for chest pain, Disp: 90 tablet, Rfl: 1    ONE TOUCH LANCETS MISC, by Does not apply route daily To test blood sugar 1x daily, Disp: 100 each, Rfl: 6    pantoprazole (PROTONIX) 40 mg tablet, Take 1 tablet (40 mg total) by mouth daily at bedtime, Disp: 90 tablet, Rfl: 1    The following portions of the patient's history were reviewed and updated as appropriate: allergies, current medications, past family history, past medical history, past social history, past surgical history and problem list     Review of Systems   Constitutional: Negative for appetite change, fatigue, fever and unexpected weight change  HENT: Negative for rhinorrhea, sinus pressure, sinus pain, sneezing and sore throat  Eyes: Negative for visual disturbance  Respiratory: Negative for cough, chest tightness, shortness of breath and wheezing  Cardiovascular: Positive for leg swelling  Negative for chest pain and palpitations  Gastrointestinal: Negative for abdominal distention, abdominal pain, blood in stool, constipation, diarrhea, nausea and vomiting  Endocrine: Negative for polydipsia and polyuria  Genitourinary: Negative for decreased urine volume, difficulty urinating, dysuria, hematuria and urgency     Musculoskeletal: Positive for back pain  Negative for arthralgias, joint swelling and neck pain  Skin: Negative for rash  Allergic/Immunologic: Negative for environmental allergies  Neurological: Negative for tremors, weakness, light-headedness, numbness and headaches  Hematological: Does not bruise/bleed easily  Psychiatric/Behavioral: Negative for agitation, behavioral problems, confusion and dysphoric mood  The patient is not nervous/anxious            Family History   Problem Relation Age of Onset    Diabetes Mother     Alcohol abuse Brother     Bipolar disorder Brother     Hypertension Brother     Kidney disease Brother     Heart disease Family     Diabetes type II Family        Past Medical History:   Diagnosis Date    Aortic valve regurgitation 01/26/2017    Arthritis     Cancer ([address] )     Coronary artery disease     Edema 12/02/2016    Heart disease     Heart valve disorder     Heart valve replaced     History of basal cell cancer     Hx of tissue graft 03/29/2017    Hyperlipemia 01/26/2017    Hypertension     Non-rheumatic mitral regurgitation 10/21/2016    Right bundle branch block (RBBB), 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       Past Surgical History:   Procedure Laterality Date    APPENDECTOMY      CARDIAC SURGERY      CARDIAC VALVE REPLACEMENT      EYE SURGERY      HERNIA REPAIR      VASECTOMY         Social History     Socioeconomic History    Marital status: /Civil Union     Spouse name: None    Number of children: 3    Years of education: None    Highest education level: None   Occupational History    Occupation: retired   Social Needs    Financial resource strain: None    Food insecurity:     Worry: None     Inability: None    Transportation needs:     Medical: None     Non-medical: None   Tobacco Use    Smoking status: Former Smoker     Types: Cigarettes, Cigars    Smokeless tobacco: Former User    Tobacco comment: quit 10 years ago, NEVER A SMOKER AS PER [employer]   Substance and Sexual Activity    Alcohol use: No    Drug use: No    Sexual activity: Yes     Partners: Female   Lifestyle    Physical activity:     Days per week: None     Minutes per session: None    Stress: None   Relationships    Social connections:     Talks on phone: None     Gets together: None     Attends Taoism service: None     Active member of club or organization: None     Attends meetings of clubs or organizations: None     Relationship status: None    Intimate partner violence:     Fear of current or ex partner: None     Emotionally abused: None     Physically abused: None     Forced sexual activity: None   Other Topics Concern    None   Social History Narrative    PT states he does not drink any caffeine        Allergies   Allergen Reactions    Orphenadrine GI Intolerance       BMI Counseling: Body mass index is 34 67 kg/m²  Discussed the patient's BMI with him  The BMI is above average  BMI counseling and education was provided to the patient  Nutrition recommendations include decreasing overall calorie intake  Objective:    /82 (BP Location: Right arm, Patient Position: Sitting, Cuff Size: Standard)   Pulse 64   Wt 103 kg (228 lb)   SpO2 98%   BMI 34 67 kg/m²        Physical Exam   Constitutional: He is oriented to person, place, and time  He appears well-developed and well-nourished  No distress  HENT:   Head: Normocephalic and atraumatic  Nose: Nose normal    Mouth/Throat: Oropharynx is clear and moist  No oropharyngeal exudate  Eyes: Pupils are equal, round, and reactive to light  Conjunctivae and EOM are normal  No scleral icterus  Neck: Normal range of motion  Neck supple  No JVD present  No tracheal deviation present  No thyromegaly present  Cardiovascular: Normal rate and regular rhythm  Exam reveals no gallop and no friction rub     Murmur (3/6 systolic ejection murmur at the base radiating to the carotids) heard   Pulmonary/Chest: Effort normal  No respiratory distress  He has no wheezes  He has no rales  He exhibits no tenderness  Abdominal: Soft  Bowel sounds are normal  He exhibits no distension and no mass  There is no tenderness  There is no rebound and no guarding  Musculoskeletal: Normal range of motion  He exhibits edema (Plus two ankle edema)  He exhibits no deformity  Lymphadenopathy:     He has no cervical adenopathy  Neurological: He is alert and oriented to person, place, and time  No cranial nerve deficit  Coordination normal    Skin: Skin is warm and dry  No rash noted  Psychiatric: He has a normal mood and affect   His behavior is normal  Judgment and thought content normal

## 2019-08-29 ENCOUNTER — OFFICE VISIT (OUTPATIENT)
Dept: FAMILY MEDICINE CLINIC | Facility: CLINIC | Age: 84
End: 2019-08-29
Payer: MEDICARE

## 2019-08-29 VITALS
HEART RATE: 64 BPM | BODY MASS INDEX: 34.67 KG/M2 | DIASTOLIC BLOOD PRESSURE: 82 MMHG | OXYGEN SATURATION: 98 % | WEIGHT: 228 LBS | SYSTOLIC BLOOD PRESSURE: 140 MMHG

## 2019-08-29 DIAGNOSIS — E87.1 HYPONATREMIA WITH EXCESS EXTRACELLULAR FLUID VOLUME: ICD-10-CM

## 2019-08-29 DIAGNOSIS — R60.9 PERIPHERAL EDEMA: ICD-10-CM

## 2019-08-29 DIAGNOSIS — E11.9 TYPE 2 DIABETES MELLITUS WITHOUT COMPLICATION, WITHOUT LONG-TERM CURRENT USE OF INSULIN (HCC): ICD-10-CM

## 2019-08-29 DIAGNOSIS — I25.10 MULTIPLE VESSEL CORONARY ARTERY DISEASE: ICD-10-CM

## 2019-08-29 DIAGNOSIS — I50.32 CHRONIC DIASTOLIC CONGESTIVE HEART FAILURE, NYHA CLASS 2 (HCC): ICD-10-CM

## 2019-08-29 DIAGNOSIS — I38 CHRONIC DIASTOLIC HEART FAILURE DUE TO VALVULAR DISEASE (HCC): Primary | ICD-10-CM

## 2019-08-29 DIAGNOSIS — Z95.2 HISTORY OF AORTIC VALVE REPLACEMENT: ICD-10-CM

## 2019-08-29 DIAGNOSIS — E78.5 HYPERLIPIDEMIA, UNSPECIFIED HYPERLIPIDEMIA TYPE: ICD-10-CM

## 2019-08-29 DIAGNOSIS — I10 HYPERTENSION, UNSPECIFIED TYPE: ICD-10-CM

## 2019-08-29 DIAGNOSIS — I50.32 CHRONIC DIASTOLIC HEART FAILURE DUE TO VALVULAR DISEASE (HCC): Primary | ICD-10-CM

## 2019-08-29 DIAGNOSIS — T82.897S AORTIC PROSTHETIC VALVE REGURGITATION, SEQUELA: ICD-10-CM

## 2019-08-29 PROCEDURE — 99214 OFFICE O/P EST MOD 30 MIN: CPT | Performed by: INTERNAL MEDICINE

## 2019-10-04 DIAGNOSIS — I38 CHRONIC DIASTOLIC HEART FAILURE DUE TO VALVULAR DISEASE (HCC): Primary | ICD-10-CM

## 2019-10-04 DIAGNOSIS — I50.32 CHRONIC DIASTOLIC HEART FAILURE DUE TO VALVULAR DISEASE (HCC): Primary | ICD-10-CM

## 2019-10-04 RX ORDER — AMOXICILLIN 500 MG/1
CAPSULE ORAL
Qty: 16 CAPSULE | Refills: 3 | Status: SHIPPED | OUTPATIENT
Start: 2019-10-04 | End: 2020-10-04

## 2019-10-23 DIAGNOSIS — I38 CHRONIC DIASTOLIC HEART FAILURE DUE TO VALVULAR DISEASE (HCC): ICD-10-CM

## 2019-10-23 DIAGNOSIS — I50.32 CHRONIC DIASTOLIC HEART FAILURE DUE TO VALVULAR DISEASE (HCC): ICD-10-CM

## 2019-10-23 DIAGNOSIS — E78.5 HYPERLIPIDEMIA, UNSPECIFIED HYPERLIPIDEMIA TYPE: ICD-10-CM

## 2019-10-24 DIAGNOSIS — E78.5 HYPERLIPIDEMIA, UNSPECIFIED HYPERLIPIDEMIA TYPE: ICD-10-CM

## 2019-10-24 DIAGNOSIS — I50.32 CHRONIC DIASTOLIC HEART FAILURE DUE TO VALVULAR DISEASE (HCC): ICD-10-CM

## 2019-10-24 DIAGNOSIS — I38 CHRONIC DIASTOLIC HEART FAILURE DUE TO VALVULAR DISEASE (HCC): ICD-10-CM

## 2019-10-24 RX ORDER — BUMETANIDE 1 MG/1
1 TABLET ORAL DAILY
Qty: 90 TABLET | Refills: 1 | Status: SHIPPED | OUTPATIENT
Start: 2019-10-24 | End: 2020-01-13 | Stop reason: SDUPTHER

## 2019-10-24 RX ORDER — ATORVASTATIN CALCIUM 20 MG/1
20 TABLET, FILM COATED ORAL DAILY
Qty: 90 TABLET | Refills: 1 | Status: SHIPPED | OUTPATIENT
Start: 2019-10-24 | End: 2020-01-13 | Stop reason: SDUPTHER

## 2019-10-24 RX ORDER — BUMETANIDE 1 MG/1
TABLET ORAL
Qty: 90 TABLET | Refills: 3 | Status: SHIPPED | OUTPATIENT
Start: 2019-10-24 | End: 2019-10-24 | Stop reason: SDUPTHER

## 2019-10-24 RX ORDER — ATORVASTATIN CALCIUM 20 MG/1
TABLET, FILM COATED ORAL
Qty: 90 TABLET | Refills: 3 | Status: SHIPPED | OUTPATIENT
Start: 2019-10-24 | End: 2019-10-24 | Stop reason: SDUPTHER

## 2019-11-12 LAB
LEFT EYE DIABETIC RETINOPATHY: NORMAL
RIGHT EYE DIABETIC RETINOPATHY: NORMAL

## 2019-11-22 ENCOUNTER — TELEPHONE (OUTPATIENT)
Dept: FAMILY MEDICINE CLINIC | Facility: CLINIC | Age: 84
End: 2019-11-22

## 2019-11-25 ENCOUNTER — APPOINTMENT (OUTPATIENT)
Dept: LAB | Age: 84
End: 2019-11-25
Payer: MEDICARE

## 2019-11-25 DIAGNOSIS — E78.5 HYPERLIPIDEMIA, UNSPECIFIED HYPERLIPIDEMIA TYPE: ICD-10-CM

## 2019-11-25 DIAGNOSIS — I50.32 CHRONIC DIASTOLIC HEART FAILURE DUE TO VALVULAR DISEASE (HCC): ICD-10-CM

## 2019-11-25 DIAGNOSIS — E11.9 TYPE 2 DIABETES MELLITUS WITHOUT COMPLICATION, WITHOUT LONG-TERM CURRENT USE OF INSULIN (HCC): ICD-10-CM

## 2019-11-25 DIAGNOSIS — I38 CHRONIC DIASTOLIC HEART FAILURE DUE TO VALVULAR DISEASE (HCC): ICD-10-CM

## 2019-11-25 LAB
ALBUMIN SERPL BCP-MCNC: 3.6 G/DL (ref 3.5–5)
ALP SERPL-CCNC: 68 U/L (ref 46–116)
ALT SERPL W P-5'-P-CCNC: 45 U/L (ref 12–78)
ANION GAP SERPL CALCULATED.3IONS-SCNC: 8 MMOL/L (ref 4–13)
AST SERPL W P-5'-P-CCNC: 36 U/L (ref 5–45)
BASOPHILS # BLD AUTO: 0.05 THOUSANDS/ΜL (ref 0–0.1)
BASOPHILS NFR BLD AUTO: 1 % (ref 0–1)
BILIRUB SERPL-MCNC: 0.6 MG/DL (ref 0.2–1)
BUN SERPL-MCNC: 23 MG/DL (ref 5–25)
CALCIUM SERPL-MCNC: 9 MG/DL (ref 8.3–10.1)
CHLORIDE SERPL-SCNC: 102 MMOL/L (ref 100–108)
CO2 SERPL-SCNC: 27 MMOL/L (ref 21–32)
CREAT SERPL-MCNC: 1.2 MG/DL (ref 0.6–1.3)
EOSINOPHIL # BLD AUTO: 0.36 THOUSAND/ΜL (ref 0–0.61)
EOSINOPHIL NFR BLD AUTO: 5 % (ref 0–6)
ERYTHROCYTE [DISTWIDTH] IN BLOOD BY AUTOMATED COUNT: 12.4 % (ref 11.6–15.1)
EST. AVERAGE GLUCOSE BLD GHB EST-MCNC: 134 MG/DL
GFR SERPL CREATININE-BSD FRML MDRD: 56 ML/MIN/1.73SQ M
GLUCOSE P FAST SERPL-MCNC: 130 MG/DL (ref 65–99)
HBA1C MFR BLD: 6.3 % (ref 4.2–6.3)
HCT VFR BLD AUTO: 36.9 % (ref 36.5–49.3)
HGB BLD-MCNC: 12.3 G/DL (ref 12–17)
IMM GRANULOCYTES # BLD AUTO: 0.06 THOUSAND/UL (ref 0–0.2)
IMM GRANULOCYTES NFR BLD AUTO: 1 % (ref 0–2)
LDLC SERPL DIRECT ASSAY-MCNC: 73 MG/DL (ref 0–100)
LYMPHOCYTES # BLD AUTO: 1.67 THOUSANDS/ΜL (ref 0.6–4.47)
LYMPHOCYTES NFR BLD AUTO: 21 % (ref 14–44)
MCH RBC QN AUTO: 33.3 PG (ref 26.8–34.3)
MCHC RBC AUTO-ENTMCNC: 33.3 G/DL (ref 31.4–37.4)
MCV RBC AUTO: 100 FL (ref 82–98)
MONOCYTES # BLD AUTO: 0.67 THOUSAND/ΜL (ref 0.17–1.22)
MONOCYTES NFR BLD AUTO: 8 % (ref 4–12)
NEUTROPHILS # BLD AUTO: 5.12 THOUSANDS/ΜL (ref 1.85–7.62)
NEUTS SEG NFR BLD AUTO: 64 % (ref 43–75)
NRBC BLD AUTO-RTO: 0 /100 WBCS
PLATELET # BLD AUTO: 177 THOUSANDS/UL (ref 149–390)
PMV BLD AUTO: 10.8 FL (ref 8.9–12.7)
POTASSIUM SERPL-SCNC: 4.4 MMOL/L (ref 3.5–5.3)
PROT SERPL-MCNC: 7.2 G/DL (ref 6.4–8.2)
RBC # BLD AUTO: 3.69 MILLION/UL (ref 3.88–5.62)
SODIUM SERPL-SCNC: 137 MMOL/L (ref 136–145)
WBC # BLD AUTO: 7.93 THOUSAND/UL (ref 4.31–10.16)

## 2019-11-25 PROCEDURE — 36415 COLL VENOUS BLD VENIPUNCTURE: CPT

## 2019-11-25 PROCEDURE — 80053 COMPREHEN METABOLIC PANEL: CPT

## 2019-11-25 PROCEDURE — 83036 HEMOGLOBIN GLYCOSYLATED A1C: CPT

## 2019-11-25 PROCEDURE — 85025 COMPLETE CBC W/AUTO DIFF WBC: CPT

## 2019-11-25 PROCEDURE — 83721 ASSAY OF BLOOD LIPOPROTEIN: CPT

## 2019-11-27 DIAGNOSIS — E11.9 TYPE 2 DIABETES MELLITUS WITHOUT COMPLICATION, WITHOUT LONG-TERM CURRENT USE OF INSULIN (HCC): ICD-10-CM

## 2019-11-27 DIAGNOSIS — E87.1 HYPONATREMIA: ICD-10-CM

## 2019-11-27 RX ORDER — METFORMIN HYDROCHLORIDE 500 MG/1
TABLET, EXTENDED RELEASE ORAL
Qty: 90 TABLET | Refills: 3 | Status: SHIPPED | OUTPATIENT
Start: 2019-11-27 | End: 2019-11-29 | Stop reason: SDUPTHER

## 2019-11-27 NOTE — PROGRESS NOTES
Assessment/Plan:      Diet reviewed  Lifestyle modifications reviewed  Medications reviewed and ordered  Laboratory tests and studies reviewed and ordered  All patient's questions answered to patient satisfaction  Chief Complaint   Patient presents with    Hyperlipidemia    Chronic diastolic heart failure due to valvular disease    Hypertension    Diabetes    Hyponatremia         Diagnoses and all orders for this visit:    Type 2 diabetes mellitus without complication, without long-term current use of insulin (HCC)  -     metFORMIN (GLUCOPHAGE-XR) 500 mg 24 hr tablet; Take 1 tablet (500 mg total) by mouth daily with dinner  -     Blood Glucose Monitoring Suppl (ONE TOUCH ULTRA 2) w/Device KIT; by Does not apply route daily To test blood sugar 1x daily DX:diabetes  -     Blood Glucose Monitoring Suppl KIT; by Does not apply route daily Test once daily DX:diabetes    Hypertension, unspecified type  -     allopurinol (ZYLOPRIM) 100 mg tablet; Take 1 tablet (100 mg total) by mouth daily  -     doxazosin (CARDURA) 4 mg tablet; Take 1 tablet (4 mg total) by mouth daily at bedtime  -     losartan (COZAAR) 100 MG tablet; Take 1 tablet (100 mg total) by mouth every 24 hours  -     CBC and differential; Future  -     Comprehensive metabolic panel; Future    Hyperlipidemia, unspecified hyperlipidemia type    Chronic diastolic heart failure due to valvular disease (HCC)  -     amLODIPine (NORVASC) 2 5 mg tablet; Take 1 tablet (2 5 mg total) by mouth daily  -     doxazosin (CARDURA) 4 mg tablet; Take 1 tablet (4 mg total) by mouth daily at bedtime  -     CBC and differential; Future  -     Comprehensive metabolic panel; Future    Hyponatremia  -     metFORMIN (GLUCOPHAGE-XR) 500 mg 24 hr tablet; Take 1 tablet (500 mg total) by mouth daily with dinner  -     CBC and differential; Future  -     Comprehensive metabolic panel; Future    GERD with esophagitis  -     doxazosin (CARDURA) 4 mg tablet;  Take 1 tablet (4 mg total) by mouth daily at bedtime        Subjective: This 81-year-old male with chronic diastolic congestive heart failure with past aortic valve replacement and coronary by his surgery has with much effort restrict his water intake to help his and controlled with a low-salt diabetic diet  With his approach he feels well except for bit of a dry mouth  His edema is much less the he has not have shortness of breath with his relatively sedentary life style  He has not any chest discomfort or passing  Laboratory studies were reviewed       Patient ID: Rj Dugan is a 81 y o  male          Current Outpatient Medications:     allopurinol (ZYLOPRIM) 100 mg tablet, Take 1 tablet (100 mg total) by mouth daily, Disp: 90 tablet, Rfl: 1    amLODIPine (NORVASC) 2 5 mg tablet, Take 1 tablet (2 5 mg total) by mouth daily, Disp: 90 tablet, Rfl: 1    atorvastatin (LIPITOR) 20 mg tablet, Take 1 tablet (20 mg total) by mouth daily, Disp: 90 tablet, Rfl: 1    Blood Glucose Monitoring Suppl (ONE TOUCH ULTRA 2) w/Device KIT, by Does not apply route daily To test blood sugar 1x daily, Disp: 1 each, Rfl: 0    Blood Glucose Monitoring Suppl KIT, by Does not apply route daily Test once daily, Disp: 1 each, Rfl: 0    bumetanide (BUMEX) 1 mg tablet, Take 1 tablet (1 mg total) by mouth daily, Disp: 90 tablet, Rfl: 1    doxazosin (CARDURA) 4 mg tablet, Take 1 tablet (4 mg total) by mouth daily at bedtime, Disp: 90 tablet, Rfl: 1    fluticasone (FLONASE) 50 mcg/act nasal spray, SPRAY 1 SPRAY INTO EACH NOSTRIL TWICE DAILY, Disp: , Rfl: 11    glucose blood (ONE TOUCH ULTRA TEST) test strip, Use as instructed, Disp: 100 each, Rfl: 6    glucose blood (ONE TOUCH ULTRA TEST) test strip, To test blood sugar 1x daily, Disp: 100 each, Rfl: 6    ipratropium (ATROVENT) 0 03 % nasal spray, SPRAY 1 SPRAY INTO EACH NOSTRIL TWICE DAILY, Disp: , Rfl: 11    losartan (COZAAR) 100 MG tablet, Take 1 tablet (100 mg total) by mouth every 24 hours, Disp: 90 tablet, Rfl: 3    metFORMIN (GLUCOPHAGE-XR) 500 mg 24 hr tablet, Take 1 tablet (500 mg total) by mouth daily with dinner, Disp: 90 tablet, Rfl: 3    nitroglycerin (NITROSTAT) 0 4 mg SL tablet, Place 1 tablet (0 4 mg total) under the tongue every 5 (five) minutes as needed for chest pain, Disp: 90 tablet, Rfl: 1    ONE TOUCH LANCETS MISC, by Does not apply route daily To test blood sugar 1x daily, Disp: 100 each, Rfl: 6    pantoprazole (PROTONIX) 40 mg tablet, Take 1 tablet (40 mg total) by mouth daily at bedtime, Disp: 90 tablet, Rfl: 1    amoxicillin (AMOXIL) 500 mg capsule, Take 4 tabs 1 hour prior to dental work (Patient not taking: Reported on 11/29/2019), Disp: 16 capsule, Rfl: 3    mometasone (ELOCON) 0 1 % lotion, One drop affected ear canal daily at bedtime when necessary itching, Disp: 60 mL, Rfl: 0    The following portions of the patient's history were reviewed and updated as appropriate: allergies, current medications, past family history, past medical history, past social history, past surgical history and problem list     Review of Systems   Constitutional: Negative for appetite change, fatigue, fever and unexpected weight change  HENT: Negative for rhinorrhea, sinus pressure, sinus pain, sneezing and sore throat  Eyes: Negative for visual disturbance  Respiratory: Negative for cough, chest tightness, shortness of breath and wheezing  Cardiovascular: Negative for chest pain, palpitations and leg swelling  Gastrointestinal: Negative for abdominal distention, abdominal pain, blood in stool, constipation, diarrhea, nausea and vomiting  Endocrine: Negative for polydipsia and polyuria  Genitourinary: Negative for decreased urine volume, difficulty urinating, dysuria, hematuria and urgency  Musculoskeletal: Negative for arthralgias, back pain, joint swelling and neck pain  Skin: Negative for rash  Allergic/Immunologic: Negative for environmental allergies     Neurological: Negative for tremors, weakness, light-headedness, numbness and headaches  Hematological: Does not bruise/bleed easily  Psychiatric/Behavioral: Negative for agitation, behavioral problems, confusion and dysphoric mood  The patient is not nervous/anxious            Family History   Problem Relation Age of Onset    Diabetes Mother     Alcohol abuse Brother     Bipolar disorder Brother     Hypertension Brother     Kidney disease Brother     Heart disease Family     Diabetes type II Family        Past Medical History:   Diagnosis Date    Aortic valve regurgitation 01/26/2017    Arthritis     Cancer ([address] )     Coronary artery disease     Edema 12/02/2016    Heart disease     Heart valve disorder     Heart valve replaced     History of basal cell cancer     Hx of tissue graft 03/29/2017    Hyperlipemia 01/26/2017    Hypertension     Non-rheumatic mitral regurgitation 10/21/2016    Right bundle branch block (RBBB), 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       Past Surgical History:   Procedure Laterality Date    APPENDECTOMY      CARDIAC SURGERY      CARDIAC VALVE REPLACEMENT      EYE SURGERY      HERNIA REPAIR      VASECTOMY         Social History     Socioeconomic History    Marital status: /Civil Union     Spouse name: None    Number of children: 3    Years of education: None    Highest education level: None   Occupational History    Occupation: retired   Social Needs    Financial resource strain: None    Food insecurity:     Worry: None     Inability: None    Transportation needs:     Medical: None     Non-medical: None   Tobacco Use    Smoking status: Former Smoker     Types: Cigarettes, Cigars    Smokeless tobacco: Former User    Tobacco comment: quit 10 years ago, NEVER A SMOKER AS PER [employer]   Substance and Sexual Activity    Alcohol use: No    Drug use: No    Sexual activity: Yes     Partners: Female   Lifestyle    Physical activity:     Days per week: None     Minutes per session: None    Stress: None   Relationships    Social connections:     Talks on phone: None     Gets together: None     Attends Yazidism service: None     Active member of club or organization: None     Attends meetings of clubs or organizations: None     Relationship status: None    Intimate partner violence:     Fear of current or ex partner: None     Emotionally abused: None     Physically abused: None     Forced sexual activity: None   Other Topics Concern    None   Social History Narrative    PT states he does not drink any caffeine        Allergies   Allergen Reactions    Orphenadrine GI Intolerance            Objective:    /60 (BP Location: Right arm, Patient Position: Sitting, Cuff Size: Standard)   Pulse 74   Ht 5' 8" (1 727 m)   Wt 104 kg (230 lb)   SpO2 97%   BMI 34 97 kg/m²        Physical Exam   Constitutional: He is oriented to person, place, and time  He appears well-developed and well-nourished  No distress  HENT:   Head: Normocephalic and atraumatic  Nose: Nose normal    Mouth/Throat: Oropharynx is clear and moist  No oropharyngeal exudate  Eyes: Pupils are equal, round, and reactive to light  Conjunctivae and EOM are normal  No scleral icterus  Neck: Normal range of motion  Neck supple  No JVD present  No tracheal deviation present  No thyromegaly present  Cardiovascular: Normal rate and regular rhythm  Exam reveals no gallop and no friction rub  Murmur heard  Pulmonary/Chest: Effort normal  No respiratory distress  He has no wheezes  He has no rales  He exhibits no tenderness  Abdominal: Soft  Bowel sounds are normal  He exhibits no distension and no mass  There is no tenderness  There is no rebound and no guarding  Musculoskeletal: Normal range of motion  He exhibits edema (Plus one bilateral ankle edema)  He exhibits no deformity  Lymphadenopathy:     He has no cervical adenopathy     Neurological: He is alert and oriented to person, place, and time  No cranial nerve deficit  Coordination normal    Skin: Skin is warm and dry  No rash noted  Psychiatric: He has a normal mood and affect   His behavior is normal  Judgment and thought content normal

## 2019-11-29 ENCOUNTER — OFFICE VISIT (OUTPATIENT)
Dept: FAMILY MEDICINE CLINIC | Facility: CLINIC | Age: 84
End: 2019-11-29
Payer: MEDICARE

## 2019-11-29 VITALS
WEIGHT: 230 LBS | SYSTOLIC BLOOD PRESSURE: 148 MMHG | HEART RATE: 74 BPM | OXYGEN SATURATION: 97 % | HEIGHT: 68 IN | DIASTOLIC BLOOD PRESSURE: 60 MMHG | BODY MASS INDEX: 34.86 KG/M2

## 2019-11-29 DIAGNOSIS — I10 HYPERTENSION, UNSPECIFIED TYPE: ICD-10-CM

## 2019-11-29 DIAGNOSIS — E78.5 HYPERLIPIDEMIA, UNSPECIFIED HYPERLIPIDEMIA TYPE: ICD-10-CM

## 2019-11-29 DIAGNOSIS — I38 CHRONIC DIASTOLIC HEART FAILURE DUE TO VALVULAR DISEASE (HCC): ICD-10-CM

## 2019-11-29 DIAGNOSIS — I50.32 CHRONIC DIASTOLIC HEART FAILURE DUE TO VALVULAR DISEASE (HCC): ICD-10-CM

## 2019-11-29 DIAGNOSIS — E11.9 TYPE 2 DIABETES MELLITUS WITHOUT COMPLICATION, WITHOUT LONG-TERM CURRENT USE OF INSULIN (HCC): Primary | ICD-10-CM

## 2019-11-29 DIAGNOSIS — K21.00 GERD WITH ESOPHAGITIS: ICD-10-CM

## 2019-11-29 DIAGNOSIS — E87.1 HYPONATREMIA: ICD-10-CM

## 2019-11-29 PROCEDURE — 99214 OFFICE O/P EST MOD 30 MIN: CPT | Performed by: INTERNAL MEDICINE

## 2019-11-29 RX ORDER — ALLOPURINOL 100 MG/1
100 TABLET ORAL DAILY
Qty: 90 TABLET | Refills: 1 | Status: SHIPPED | OUTPATIENT
Start: 2019-11-29 | End: 2020-01-07

## 2019-11-29 RX ORDER — DOXAZOSIN MESYLATE 4 MG/1
4 TABLET ORAL
Qty: 90 TABLET | Refills: 1 | Status: SHIPPED | OUTPATIENT
Start: 2019-11-29 | End: 2020-01-13 | Stop reason: SDUPTHER

## 2019-11-29 RX ORDER — LOSARTAN POTASSIUM 100 MG/1
100 TABLET ORAL EVERY 24 HOURS
Qty: 90 TABLET | Refills: 3 | Status: SHIPPED | OUTPATIENT
Start: 2019-11-29 | End: 2020-01-13 | Stop reason: SDUPTHER

## 2019-11-29 RX ORDER — METFORMIN HYDROCHLORIDE 500 MG/1
500 TABLET, EXTENDED RELEASE ORAL
Qty: 90 TABLET | Refills: 3 | Status: SHIPPED | OUTPATIENT
Start: 2019-11-29 | End: 2020-01-13 | Stop reason: SDUPTHER

## 2019-11-29 RX ORDER — AMLODIPINE BESYLATE 2.5 MG/1
2.5 TABLET ORAL DAILY
Qty: 90 TABLET | Refills: 1 | Status: SHIPPED | OUTPATIENT
Start: 2019-11-29 | End: 2020-01-06

## 2019-12-01 RX ORDER — BLOOD-GLUCOSE METER
EACH MISCELLANEOUS DAILY
Qty: 1 EACH | Refills: 0 | Status: SHIPPED | OUTPATIENT
Start: 2019-12-01

## 2019-12-23 ENCOUNTER — TELEPHONE (OUTPATIENT)
Dept: FAMILY MEDICINE CLINIC | Facility: CLINIC | Age: 84
End: 2019-12-23

## 2019-12-24 RX ORDER — ICOSAPENT ETHYL 1000 MG/1
CAPSULE ORAL
Qty: 180 CAPSULE | Refills: 1 | Status: CANCELLED | OUTPATIENT
Start: 2019-12-24

## 2020-01-05 DIAGNOSIS — I50.32 CHRONIC DIASTOLIC HEART FAILURE DUE TO VALVULAR DISEASE (HCC): ICD-10-CM

## 2020-01-05 DIAGNOSIS — I38 CHRONIC DIASTOLIC HEART FAILURE DUE TO VALVULAR DISEASE (HCC): ICD-10-CM

## 2020-01-05 DIAGNOSIS — I10 HYPERTENSION, UNSPECIFIED TYPE: ICD-10-CM

## 2020-01-06 RX ORDER — AMLODIPINE BESYLATE 2.5 MG/1
TABLET ORAL
Qty: 90 TABLET | Refills: 0 | Status: SHIPPED | OUTPATIENT
Start: 2020-01-06 | End: 2020-01-13 | Stop reason: SDUPTHER

## 2020-01-07 RX ORDER — ALLOPURINOL 100 MG/1
TABLET ORAL
Qty: 90 TABLET | Refills: 0 | Status: SHIPPED | OUTPATIENT
Start: 2020-01-07 | End: 2020-01-13 | Stop reason: SDUPTHER

## 2020-01-13 DIAGNOSIS — E11.9 TYPE 2 DIABETES MELLITUS WITHOUT COMPLICATION, WITHOUT LONG-TERM CURRENT USE OF INSULIN (HCC): ICD-10-CM

## 2020-01-13 DIAGNOSIS — I10 HYPERTENSION, UNSPECIFIED TYPE: ICD-10-CM

## 2020-01-13 DIAGNOSIS — E78.5 HYPERLIPIDEMIA, UNSPECIFIED HYPERLIPIDEMIA TYPE: ICD-10-CM

## 2020-01-13 DIAGNOSIS — K21.00 GERD WITH ESOPHAGITIS: ICD-10-CM

## 2020-01-13 DIAGNOSIS — E87.1 HYPONATREMIA: ICD-10-CM

## 2020-01-13 DIAGNOSIS — I38 CHRONIC DIASTOLIC HEART FAILURE DUE TO VALVULAR DISEASE (HCC): ICD-10-CM

## 2020-01-13 DIAGNOSIS — I50.32 CHRONIC DIASTOLIC HEART FAILURE DUE TO VALVULAR DISEASE (HCC): ICD-10-CM

## 2020-01-14 RX ORDER — DOXAZOSIN MESYLATE 4 MG/1
4 TABLET ORAL
Qty: 90 TABLET | Refills: 1 | Status: SHIPPED | OUTPATIENT
Start: 2020-01-14 | End: 2020-03-26

## 2020-01-14 RX ORDER — METFORMIN HYDROCHLORIDE 500 MG/1
500 TABLET, EXTENDED RELEASE ORAL
Qty: 90 TABLET | Refills: 3 | Status: SHIPPED | OUTPATIENT
Start: 2020-01-14 | End: 2020-06-02 | Stop reason: RX

## 2020-01-14 RX ORDER — AMLODIPINE BESYLATE 2.5 MG/1
2.5 TABLET ORAL DAILY
Qty: 90 TABLET | Refills: 1 | Status: SHIPPED | OUTPATIENT
Start: 2020-01-14 | End: 2020-04-06

## 2020-01-14 RX ORDER — BUMETANIDE 1 MG/1
1 TABLET ORAL DAILY
Qty: 90 TABLET | Refills: 1 | Status: SHIPPED | OUTPATIENT
Start: 2020-01-14 | End: 2020-06-30 | Stop reason: SDUPTHER

## 2020-01-14 RX ORDER — ATORVASTATIN CALCIUM 20 MG/1
20 TABLET, FILM COATED ORAL DAILY
Qty: 90 TABLET | Refills: 1 | Status: SHIPPED | OUTPATIENT
Start: 2020-01-14 | End: 2020-06-30 | Stop reason: SDUPTHER

## 2020-01-14 RX ORDER — LOSARTAN POTASSIUM 100 MG/1
100 TABLET ORAL EVERY 24 HOURS
Qty: 90 TABLET | Refills: 3 | Status: SHIPPED | OUTPATIENT
Start: 2020-01-14 | End: 2020-02-25 | Stop reason: ALTCHOICE

## 2020-01-14 RX ORDER — ALLOPURINOL 100 MG/1
100 TABLET ORAL DAILY
Qty: 90 TABLET | Refills: 0 | Status: SHIPPED | OUTPATIENT
Start: 2020-01-14 | End: 2020-06-10 | Stop reason: SDUPTHER

## 2020-01-16 DIAGNOSIS — E11.9 TYPE 2 DIABETES MELLITUS WITHOUT COMPLICATION, WITHOUT LONG-TERM CURRENT USE OF INSULIN (HCC): ICD-10-CM

## 2020-02-20 ENCOUNTER — APPOINTMENT (OUTPATIENT)
Dept: LAB | Age: 85
End: 2020-02-20
Payer: MEDICARE

## 2020-02-20 DIAGNOSIS — I50.32 CHRONIC DIASTOLIC HEART FAILURE DUE TO VALVULAR DISEASE (HCC): ICD-10-CM

## 2020-02-20 DIAGNOSIS — E87.1 HYPONATREMIA: ICD-10-CM

## 2020-02-20 DIAGNOSIS — I10 HYPERTENSION, UNSPECIFIED TYPE: ICD-10-CM

## 2020-02-20 DIAGNOSIS — I38 CHRONIC DIASTOLIC HEART FAILURE DUE TO VALVULAR DISEASE (HCC): ICD-10-CM

## 2020-02-20 LAB
ALBUMIN SERPL BCP-MCNC: 3.2 G/DL (ref 3.5–5)
ALP SERPL-CCNC: 81 U/L (ref 46–116)
ALT SERPL W P-5'-P-CCNC: 62 U/L (ref 12–78)
ANION GAP SERPL CALCULATED.3IONS-SCNC: 7 MMOL/L (ref 4–13)
AST SERPL W P-5'-P-CCNC: 53 U/L (ref 5–45)
BASOPHILS # BLD AUTO: 0.03 THOUSANDS/ΜL (ref 0–0.1)
BASOPHILS NFR BLD AUTO: 0 % (ref 0–1)
BILIRUB SERPL-MCNC: 0.53 MG/DL (ref 0.2–1)
BUN SERPL-MCNC: 25 MG/DL (ref 5–25)
CALCIUM SERPL-MCNC: 9 MG/DL (ref 8.3–10.1)
CHLORIDE SERPL-SCNC: 101 MMOL/L (ref 100–108)
CO2 SERPL-SCNC: 28 MMOL/L (ref 21–32)
CREAT SERPL-MCNC: 1.24 MG/DL (ref 0.6–1.3)
EOSINOPHIL # BLD AUTO: 0.22 THOUSAND/ΜL (ref 0–0.61)
EOSINOPHIL NFR BLD AUTO: 3 % (ref 0–6)
ERYTHROCYTE [DISTWIDTH] IN BLOOD BY AUTOMATED COUNT: 12.8 % (ref 11.6–15.1)
GFR SERPL CREATININE-BSD FRML MDRD: 53 ML/MIN/1.73SQ M
GLUCOSE P FAST SERPL-MCNC: 128 MG/DL (ref 65–99)
HCT VFR BLD AUTO: 36.9 % (ref 36.5–49.3)
HGB BLD-MCNC: 12.3 G/DL (ref 12–17)
IMM GRANULOCYTES # BLD AUTO: 0.03 THOUSAND/UL (ref 0–0.2)
IMM GRANULOCYTES NFR BLD AUTO: 0 % (ref 0–2)
LYMPHOCYTES # BLD AUTO: 2.15 THOUSANDS/ΜL (ref 0.6–4.47)
LYMPHOCYTES NFR BLD AUTO: 30 % (ref 14–44)
MCH RBC QN AUTO: 33.1 PG (ref 26.8–34.3)
MCHC RBC AUTO-ENTMCNC: 33.3 G/DL (ref 31.4–37.4)
MCV RBC AUTO: 99 FL (ref 82–98)
MONOCYTES # BLD AUTO: 0.92 THOUSAND/ΜL (ref 0.17–1.22)
MONOCYTES NFR BLD AUTO: 13 % (ref 4–12)
NEUTROPHILS # BLD AUTO: 3.76 THOUSANDS/ΜL (ref 1.85–7.62)
NEUTS SEG NFR BLD AUTO: 54 % (ref 43–75)
NRBC BLD AUTO-RTO: 0 /100 WBCS
PLATELET # BLD AUTO: 163 THOUSANDS/UL (ref 149–390)
PMV BLD AUTO: 10.2 FL (ref 8.9–12.7)
POTASSIUM SERPL-SCNC: 4.3 MMOL/L (ref 3.5–5.3)
PROT SERPL-MCNC: 7 G/DL (ref 6.4–8.2)
RBC # BLD AUTO: 3.72 MILLION/UL (ref 3.88–5.62)
SODIUM SERPL-SCNC: 136 MMOL/L (ref 136–145)
WBC # BLD AUTO: 7.11 THOUSAND/UL (ref 4.31–10.16)

## 2020-02-20 PROCEDURE — 85025 COMPLETE CBC W/AUTO DIFF WBC: CPT

## 2020-02-20 PROCEDURE — 36415 COLL VENOUS BLD VENIPUNCTURE: CPT

## 2020-02-20 PROCEDURE — 80053 COMPREHEN METABOLIC PANEL: CPT

## 2020-02-25 ENCOUNTER — TELEPHONE (OUTPATIENT)
Dept: FAMILY MEDICINE CLINIC | Facility: CLINIC | Age: 85
End: 2020-02-25

## 2020-02-25 ENCOUNTER — OFFICE VISIT (OUTPATIENT)
Dept: FAMILY MEDICINE CLINIC | Facility: CLINIC | Age: 85
End: 2020-02-25
Payer: MEDICARE

## 2020-02-25 VITALS
OXYGEN SATURATION: 96 % | HEART RATE: 69 BPM | BODY MASS INDEX: 34.43 KG/M2 | HEIGHT: 68 IN | WEIGHT: 227.2 LBS | SYSTOLIC BLOOD PRESSURE: 140 MMHG | TEMPERATURE: 97.8 F | DIASTOLIC BLOOD PRESSURE: 60 MMHG

## 2020-02-25 DIAGNOSIS — E87.1 HYPONATREMIA WITH EXCESS EXTRACELLULAR FLUID VOLUME: ICD-10-CM

## 2020-02-25 DIAGNOSIS — I34.0 NON-RHEUMATIC MITRAL REGURGITATION: ICD-10-CM

## 2020-02-25 DIAGNOSIS — T82.897D AORTIC PROSTHETIC VALVE REGURGITATION, SUBSEQUENT ENCOUNTER: Primary | ICD-10-CM

## 2020-02-25 DIAGNOSIS — I45.2 RIGHT BUNDLE BRANCH BLOCK WITH LEFT ANTERIOR FASCICULAR BLOCK: ICD-10-CM

## 2020-02-25 DIAGNOSIS — I25.10 MULTIPLE VESSEL CORONARY ARTERY DISEASE: ICD-10-CM

## 2020-02-25 DIAGNOSIS — I10 BENIGN ESSENTIAL HYPERTENSION: ICD-10-CM

## 2020-02-25 DIAGNOSIS — Z95.4 HISTORY OF HEART VALVE REPLACEMENT WITH TISSUE GRAFT: ICD-10-CM

## 2020-02-25 DIAGNOSIS — E11.9 TYPE 2 DIABETES MELLITUS WITHOUT COMPLICATION, WITHOUT LONG-TERM CURRENT USE OF INSULIN (HCC): ICD-10-CM

## 2020-02-25 PROBLEM — E11.42 DIABETIC POLYNEUROPATHY (HCC): Status: ACTIVE | Noted: 2020-01-17

## 2020-02-25 PROBLEM — I70.209 ATHEROSCLEROSIS OF NATIVE ARTERY OF EXTREMITY (HCC): Status: ACTIVE | Noted: 2020-01-17

## 2020-02-25 PROBLEM — I25.119 CORONARY ARTERY DISEASE WITH ANGINA PECTORIS (HCC): Status: RESOLVED | Noted: 2019-03-19 | Resolved: 2020-02-25

## 2020-02-25 PROCEDURE — 3078F DIAST BP <80 MM HG: CPT | Performed by: INTERNAL MEDICINE

## 2020-02-25 PROCEDURE — 4040F PNEUMOC VAC/ADMIN/RCVD: CPT | Performed by: INTERNAL MEDICINE

## 2020-02-25 PROCEDURE — 99214 OFFICE O/P EST MOD 30 MIN: CPT | Performed by: INTERNAL MEDICINE

## 2020-02-25 PROCEDURE — 2022F DILAT RTA XM EVC RTNOPTHY: CPT | Performed by: INTERNAL MEDICINE

## 2020-02-25 PROCEDURE — 3077F SYST BP >= 140 MM HG: CPT | Performed by: INTERNAL MEDICINE

## 2020-02-25 PROCEDURE — 3008F BODY MASS INDEX DOCD: CPT | Performed by: INTERNAL MEDICINE

## 2020-02-25 PROCEDURE — 1160F RVW MEDS BY RX/DR IN RCRD: CPT | Performed by: INTERNAL MEDICINE

## 2020-02-25 PROCEDURE — 1036F TOBACCO NON-USER: CPT | Performed by: INTERNAL MEDICINE

## 2020-02-25 NOTE — PROGRESS NOTES
Assessment/Plan:      Diet reviewed  Lifestyle modifications reviewed  Medications reviewed and ordered  Laboratory tests and studies reviewed and ordered  All patient's questions answered to patient satisfaction  Diagnoses and all orders for this visit:    Aortic prosthetic valve regurgitation, subsequent encounter  -     Echo complete with contrast if indicated; Future    Hyponatremia with excess extracellular fluid volume    History of heart valve replacement with tissue graft  -     Echo complete with contrast if indicated; Future    Right bundle branch block with left anterior fascicular block    Non-rheumatic mitral regurgitation  -     Echo complete with contrast if indicated; Future    Multiple vessel coronary artery disease  -     Hemoglobin A1C; Future  -     Comprehensive metabolic panel; Future  -     CBC and differential; Future  -     Echo complete with contrast if indicated; Future    Benign essential hypertension  -     Hemoglobin A1C; Future  -     Comprehensive metabolic panel; Future  -     CBC and differential; Future  -     Echo complete with contrast if indicated; Future    Type 2 diabetes mellitus without complication, without long-term current use of insulin (HCC)  -     Hemoglobin A1C; Future    Other orders  -     Zoster Vac Recomb Adjuvanted (Shingrix) 50 MCG/0 5ML SUSR; Shingrix (PF) 50 mcg/0 5 mL intramuscular suspension, kit        Chief Complaint   Patient presents with    Labs Only         Subjective: This 71-year-old male is seen for the following:    Hypertension well controlled at home with blood pressure usually approximately 816 systolic on 161 mg of losartan and the other medications as listed  Recurrent hyponatremia probably secondary to chronic diastolic heart failure in diuretic therapy and increase oral intake of free water which the patient has limited and is now able to control his sodium which is 136 recently  Renal function is excellent      Patient has a tissue aortic valve replacement with mild aortic insufficiency  It has been a urine half since his last echo and an echo will be ordered  Diabetes well controlled with an excellent hemoglobin A1c       Patient ID: Julieta Drake is a 81 y o  male          Current Outpatient Medications:     allopurinol (ZYLOPRIM) 100 mg tablet, Take 1 tablet (100 mg total) by mouth daily, Disp: 90 tablet, Rfl: 0    amLODIPine (NORVASC) 2 5 mg tablet, Take 1 tablet (2 5 mg total) by mouth daily, Disp: 90 tablet, Rfl: 1    atorvastatin (LIPITOR) 20 mg tablet, Take 1 tablet (20 mg total) by mouth daily, Disp: 90 tablet, Rfl: 1    Blood Glucose Monitoring Suppl (ONE TOUCH ULTRA 2) w/Device KIT, by Does not apply route daily To test blood sugar 1x daily DX:diabetes, Disp: 1 each, Rfl: 0    Blood Glucose Monitoring Suppl KIT, by Does not apply route daily Test once daily DX:diabetes, Disp: 1 each, Rfl: 0    bumetanide (BUMEX) 1 mg tablet, Take 1 tablet (1 mg total) by mouth daily, Disp: 90 tablet, Rfl: 1    doxazosin (CARDURA) 4 mg tablet, Take 1 tablet (4 mg total) by mouth daily at bedtime, Disp: 90 tablet, Rfl: 1    fluticasone (FLONASE) 50 mcg/act nasal spray, 1 spray into each nostril 2 (two) times a day, Disp: 1 Bottle, Rfl: 11    glucose blood (ONE TOUCH ULTRA TEST) test strip, Use as instructed, Disp: 100 each, Rfl: 6    glucose blood (ONE TOUCH ULTRA TEST) test strip, To test blood sugar 1x daily, Disp: 100 each, Rfl: 6    metFORMIN (GLUCOPHAGE-XR) 500 mg 24 hr tablet, Take 1 tablet (500 mg total) by mouth daily with dinner, Disp: 90 tablet, Rfl: 3    mometasone (ELOCON) 0 1 % lotion, One drop affected ear canal daily at bedtime when necessary itching, Disp: 60 mL, Rfl: 0    ONE TOUCH LANCETS MISC, by Does not apply route daily To test blood sugar 1x daily, Disp: 100 each, Rfl: 6    amoxicillin (AMOXIL) 500 mg capsule, Take 4 tabs 1 hour prior to dental work (Patient not taking: Reported on 11/29/2019), Disp: 16 capsule, Rfl: 3    ipratropium (ATROVENT) 0 03 % nasal spray, 1 spray into each nostril 3 (three) times a day, Disp: 30 mL, Rfl: 11    mometasone (ELOCON) 0 1 % lotion, One drop affected ear canal daily at bedtime when necessary itching, Disp: 60 mL, Rfl: 0    nitroglycerin (NITROSTAT) 0 4 mg SL tablet, Place 1 tablet (0 4 mg total) under the tongue every 5 (five) minutes as needed for chest pain (Patient not taking: Reported on 2/25/2020), Disp: 90 tablet, Rfl: 1    pantoprazole (PROTONIX) 40 mg tablet, Take 1 tablet (40 mg total) by mouth daily at bedtime, Disp: 90 tablet, Rfl: 1    Zoster Vac Recomb Adjuvanted (Shingrix) 50 MCG/0 5ML SUSR, Shingrix (PF) 50 mcg/0 5 mL intramuscular suspension, kit, Disp: , Rfl:     The following portions of the patient's history were reviewed and updated as appropriate: allergies, current medications, past family history, past medical history, past social history, past surgical history and problem list     Review of Systems   Constitutional: Negative for appetite change, fatigue, fever and unexpected weight change  HENT: Negative for rhinorrhea, sinus pressure, sinus pain, sneezing and sore throat  Eyes: Negative for visual disturbance  Respiratory: Negative for cough, chest tightness, shortness of breath and wheezing  Cardiovascular: Negative for chest pain, palpitations and leg swelling  Gastrointestinal: Negative for abdominal distention, abdominal pain, blood in stool, constipation, diarrhea, nausea and vomiting  Endocrine: Negative for polydipsia and polyuria  Genitourinary: Negative for decreased urine volume, difficulty urinating, dysuria, hematuria and urgency  Musculoskeletal: Negative for arthralgias, back pain, joint swelling and neck pain  Skin: Negative for rash  Allergic/Immunologic: Negative for environmental allergies  Neurological: Negative for tremors, weakness, light-headedness, numbness and headaches     Hematological: Does not bruise/bleed easily  Psychiatric/Behavioral: Negative for agitation, behavioral problems, confusion and dysphoric mood  The patient is not nervous/anxious            Family History   Problem Relation Age of Onset    Diabetes Mother     Alcohol abuse Brother     Bipolar disorder Brother     Hypertension Brother     Kidney disease Brother     Heart disease Family     Diabetes type II Family        Past Medical History:   Diagnosis Date    Aortic valve regurgitation 01/26/2017    Arthritis     Cancer ([address]     Coronary artery disease     Coronary artery disease with angina pectoris ([address] ) 3/19/2019    Edema 12/02/2016    Heart disease     Heart valve disorder     Heart valve replaced     History of basal cell cancer     Hx of tissue graft 03/29/2017    Hyperlipemia 01/26/2017    Hypertension     Non-rheumatic mitral regurgitation 10/21/2016    Right bundle branch block (RBBB), 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       Past Surgical History:   Procedure Laterality Date    APPENDECTOMY      CARDIAC SURGERY      CARDIAC VALVE REPLACEMENT      EYE SURGERY      HERNIA REPAIR      VASECTOMY         Social History     Socioeconomic History    Marital status: /Civil Union     Spouse name: None    Number of children: 3    Years of education: None    Highest education level: None   Occupational History    Occupation: retired   Social Needs    Financial resource strain: None    Food insecurity:     Worry: None     Inability: None    Transportation needs:     Medical: None     Non-medical: None   Tobacco Use    Smoking status: Former Smoker     Types: Cigarettes, Cigars    Smokeless tobacco: Former User    Tobacco comment: quit 10 years ago, NEVER A SMOKER AS PER [employer]   Substance and Sexual Activity    Alcohol use: No    Drug use: No    Sexual activity: Yes     Partners: Female   Lifestyle    Physical activity:     Days per week: None     Minutes per session: None    Stress: None   Relationships    Social connections:     Talks on phone: None     Gets together: None     Attends Faith service: None     Active member of club or organization: None     Attends meetings of clubs or organizations: None     Relationship status: None    Intimate partner violence:     Fear of current or ex partner: None     Emotionally abused: None     Physically abused: None     Forced sexual activity: None   Other Topics Concern    None   Social History Narrative    PT states he does not drink any caffeine        Allergies   Allergen Reactions    Orphenadrine GI Intolerance         Objective:    /60   Pulse 69   Temp 97 8 °F (36 6 °C)   Ht 5' 8" (1 727 m)   Wt 103 kg (227 lb 3 2 oz)   SpO2 96%   BMI 34 55 kg/m²        Physical Exam   Cardiovascular:   Murmur heard  Musculoskeletal: He exhibits edema

## 2020-02-28 ENCOUNTER — TELEPHONE (OUTPATIENT)
Dept: FAMILY MEDICINE CLINIC | Facility: CLINIC | Age: 85
End: 2020-02-28

## 2020-02-28 DIAGNOSIS — T82.897D AORTIC PROSTHETIC VALVE REGURGITATION, SUBSEQUENT ENCOUNTER: Primary | ICD-10-CM

## 2020-02-28 DIAGNOSIS — I25.10 MULTIPLE VESSEL CORONARY ARTERY DISEASE: ICD-10-CM

## 2020-02-28 DIAGNOSIS — I10 BENIGN ESSENTIAL HYPERTENSION: ICD-10-CM

## 2020-02-28 DIAGNOSIS — Z95.4 HISTORY OF HEART VALVE REPLACEMENT WITH TISSUE GRAFT: ICD-10-CM

## 2020-02-28 NOTE — TELEPHONE ENCOUNTER
Central scheduling was unable to schedule echo because there was a expected date on there so I re-ordered and they were able to process

## 2020-03-03 ENCOUNTER — HOSPITAL ENCOUNTER (OUTPATIENT)
Dept: NON INVASIVE DIAGNOSTICS | Facility: HOSPITAL | Age: 85
Discharge: HOME | End: 2020-03-03
Payer: MEDICARE

## 2020-03-03 DIAGNOSIS — I10 BENIGN ESSENTIAL HYPERTENSION: ICD-10-CM

## 2020-03-03 DIAGNOSIS — T82.897D AORTIC PROSTHETIC VALVE REGURGITATION, SUBSEQUENT ENCOUNTER: ICD-10-CM

## 2020-03-03 DIAGNOSIS — Z95.4 HISTORY OF HEART VALVE REPLACEMENT WITH TISSUE GRAFT: ICD-10-CM

## 2020-03-03 DIAGNOSIS — I25.10 MULTIPLE VESSEL CORONARY ARTERY DISEASE: ICD-10-CM

## 2020-03-03 PROCEDURE — 93306 TTE W/DOPPLER COMPLETE: CPT

## 2020-03-03 PROCEDURE — 93306 TTE W/DOPPLER COMPLETE: CPT | Performed by: INTERNAL MEDICINE

## 2020-03-26 DIAGNOSIS — I10 HYPERTENSION, UNSPECIFIED TYPE: ICD-10-CM

## 2020-03-26 DIAGNOSIS — I50.32 CHRONIC DIASTOLIC HEART FAILURE DUE TO VALVULAR DISEASE (HCC): ICD-10-CM

## 2020-03-26 DIAGNOSIS — K21.00 GERD WITH ESOPHAGITIS: ICD-10-CM

## 2020-03-26 DIAGNOSIS — I38 CHRONIC DIASTOLIC HEART FAILURE DUE TO VALVULAR DISEASE (HCC): ICD-10-CM

## 2020-03-26 RX ORDER — DOXAZOSIN MESYLATE 4 MG/1
TABLET ORAL
Qty: 90 TABLET | Refills: 1 | Status: SHIPPED | OUTPATIENT
Start: 2020-03-26 | End: 2020-07-21 | Stop reason: SDUPTHER

## 2020-04-02 DIAGNOSIS — E11.9 TYPE 2 DIABETES MELLITUS WITHOUT COMPLICATION, WITHOUT LONG-TERM CURRENT USE OF INSULIN (HCC): ICD-10-CM

## 2020-04-02 RX ORDER — LANCETS
EACH MISCELLANEOUS
Qty: 100 EACH | Refills: 3 | Status: SHIPPED | OUTPATIENT
Start: 2020-04-02 | End: 2021-02-02

## 2020-04-06 DIAGNOSIS — I38 CHRONIC DIASTOLIC HEART FAILURE DUE TO VALVULAR DISEASE (HCC): ICD-10-CM

## 2020-04-06 DIAGNOSIS — I50.32 CHRONIC DIASTOLIC HEART FAILURE DUE TO VALVULAR DISEASE (HCC): ICD-10-CM

## 2020-04-06 RX ORDER — AMLODIPINE BESYLATE 2.5 MG/1
TABLET ORAL
Qty: 90 TABLET | Refills: 0 | Status: SHIPPED | OUTPATIENT
Start: 2020-04-06 | End: 2020-07-06 | Stop reason: SDUPTHER

## 2020-04-16 ENCOUNTER — TELEMEDICINE (OUTPATIENT)
Dept: FAMILY MEDICINE CLINIC | Facility: CLINIC | Age: 85
End: 2020-04-16
Payer: MEDICARE

## 2020-04-16 DIAGNOSIS — T82.897D AORTIC PROSTHETIC VALVE REGURGITATION, SUBSEQUENT ENCOUNTER: Primary | ICD-10-CM

## 2020-04-16 DIAGNOSIS — E78.00 PURE HYPERCHOLESTEROLEMIA: ICD-10-CM

## 2020-04-16 DIAGNOSIS — I50.32 CHRONIC DIASTOLIC CONGESTIVE HEART FAILURE, NYHA CLASS 2 (HCC): ICD-10-CM

## 2020-04-16 DIAGNOSIS — I25.10 MULTIPLE VESSEL CORONARY ARTERY DISEASE: ICD-10-CM

## 2020-04-16 DIAGNOSIS — M48.061 SPINAL STENOSIS OF LUMBAR REGION WITHOUT NEUROGENIC CLAUDICATION: ICD-10-CM

## 2020-04-16 DIAGNOSIS — E87.1 HYPONATREMIA WITH EXCESS EXTRACELLULAR FLUID VOLUME: ICD-10-CM

## 2020-04-16 DIAGNOSIS — E11.9 TYPE 2 DIABETES MELLITUS WITHOUT COMPLICATION, WITHOUT LONG-TERM CURRENT USE OF INSULIN (HCC): ICD-10-CM

## 2020-04-16 DIAGNOSIS — R60.9 EDEMA, UNSPECIFIED TYPE: ICD-10-CM

## 2020-04-16 DIAGNOSIS — I10 BENIGN ESSENTIAL HYPERTENSION: ICD-10-CM

## 2020-04-16 PROCEDURE — 99442 PR PHYS/QHP TELEPHONE EVALUATION 11-20 MIN: CPT | Performed by: INTERNAL MEDICINE

## 2020-04-17 DIAGNOSIS — E11.9 TYPE 2 DIABETES MELLITUS WITHOUT COMPLICATION, WITHOUT LONG-TERM CURRENT USE OF INSULIN (HCC): ICD-10-CM

## 2020-05-31 DIAGNOSIS — I10 HYPERTENSION, UNSPECIFIED TYPE: ICD-10-CM

## 2020-06-01 RX ORDER — LOSARTAN POTASSIUM 100 MG/1
100 TABLET ORAL EVERY 24 HOURS
Qty: 90 TABLET | Refills: 3 | Status: SHIPPED | OUTPATIENT
Start: 2020-06-01 | End: 2020-06-02 | Stop reason: RX

## 2020-06-02 ENCOUNTER — TELEPHONE (OUTPATIENT)
Dept: FAMILY MEDICINE CLINIC | Facility: CLINIC | Age: 85
End: 2020-06-02

## 2020-06-02 DIAGNOSIS — I10 BENIGN ESSENTIAL HYPERTENSION: Primary | ICD-10-CM

## 2020-06-02 DIAGNOSIS — E11.9 TYPE 2 DIABETES MELLITUS WITHOUT COMPLICATION, WITHOUT LONG-TERM CURRENT USE OF INSULIN (HCC): ICD-10-CM

## 2020-06-02 RX ORDER — TELMISARTAN 40 MG/1
40 TABLET ORAL DAILY
Qty: 30 TABLET | Refills: 5 | Status: SHIPPED | OUTPATIENT
Start: 2020-06-02 | End: 2020-06-30 | Stop reason: SDUPTHER

## 2020-06-10 DIAGNOSIS — I10 HYPERTENSION, UNSPECIFIED TYPE: ICD-10-CM

## 2020-06-10 RX ORDER — ALLOPURINOL 100 MG/1
100 TABLET ORAL DAILY
Qty: 90 TABLET | Refills: 0 | Status: SHIPPED | OUTPATIENT
Start: 2020-06-10 | End: 2020-06-18

## 2020-06-12 ENCOUNTER — TELEPHONE (OUTPATIENT)
Dept: FAMILY MEDICINE CLINIC | Facility: CLINIC | Age: 85
End: 2020-06-12

## 2020-06-17 DIAGNOSIS — I10 HYPERTENSION, UNSPECIFIED TYPE: ICD-10-CM

## 2020-06-18 RX ORDER — ALLOPURINOL 100 MG/1
TABLET ORAL
Qty: 90 TABLET | Refills: 0 | Status: SHIPPED | OUTPATIENT
Start: 2020-06-18 | End: 2020-09-08 | Stop reason: SDUPTHER

## 2020-06-25 ENCOUNTER — APPOINTMENT (OUTPATIENT)
Dept: LAB | Age: 85
End: 2020-06-25
Payer: MEDICARE

## 2020-06-25 DIAGNOSIS — E11.9 TYPE 2 DIABETES MELLITUS WITHOUT COMPLICATION, WITHOUT LONG-TERM CURRENT USE OF INSULIN (HCC): ICD-10-CM

## 2020-06-25 DIAGNOSIS — I25.10 MULTIPLE VESSEL CORONARY ARTERY DISEASE: ICD-10-CM

## 2020-06-25 DIAGNOSIS — I10 BENIGN ESSENTIAL HYPERTENSION: ICD-10-CM

## 2020-06-25 LAB
ALBUMIN SERPL BCP-MCNC: 3.2 G/DL (ref 3.5–5)
ALP SERPL-CCNC: 70 U/L (ref 46–116)
ALT SERPL W P-5'-P-CCNC: 46 U/L (ref 12–78)
ANION GAP SERPL CALCULATED.3IONS-SCNC: 8 MMOL/L (ref 4–13)
AST SERPL W P-5'-P-CCNC: 40 U/L (ref 5–45)
BASOPHILS # BLD AUTO: 0.06 THOUSANDS/ΜL (ref 0–0.1)
BASOPHILS NFR BLD AUTO: 1 % (ref 0–1)
BILIRUB SERPL-MCNC: 0.71 MG/DL (ref 0.2–1)
BUN SERPL-MCNC: 24 MG/DL (ref 5–25)
CALCIUM SERPL-MCNC: 8.6 MG/DL (ref 8.3–10.1)
CHLORIDE SERPL-SCNC: 102 MMOL/L (ref 100–108)
CO2 SERPL-SCNC: 26 MMOL/L (ref 21–32)
CREAT SERPL-MCNC: 1.24 MG/DL (ref 0.6–1.3)
EOSINOPHIL # BLD AUTO: 0.37 THOUSAND/ΜL (ref 0–0.61)
EOSINOPHIL NFR BLD AUTO: 4 % (ref 0–6)
ERYTHROCYTE [DISTWIDTH] IN BLOOD BY AUTOMATED COUNT: 12.7 % (ref 11.6–15.1)
EST. AVERAGE GLUCOSE BLD GHB EST-MCNC: 137 MG/DL
GFR SERPL CREATININE-BSD FRML MDRD: 53 ML/MIN/1.73SQ M
GLUCOSE P FAST SERPL-MCNC: 129 MG/DL (ref 65–99)
HBA1C MFR BLD: 6.4 %
HCT VFR BLD AUTO: 38.8 % (ref 36.5–49.3)
HGB BLD-MCNC: 12.6 G/DL (ref 12–17)
IMM GRANULOCYTES # BLD AUTO: 0.04 THOUSAND/UL (ref 0–0.2)
IMM GRANULOCYTES NFR BLD AUTO: 1 % (ref 0–2)
LYMPHOCYTES # BLD AUTO: 2.37 THOUSANDS/ΜL (ref 0.6–4.47)
LYMPHOCYTES NFR BLD AUTO: 27 % (ref 14–44)
MCH RBC QN AUTO: 33.2 PG (ref 26.8–34.3)
MCHC RBC AUTO-ENTMCNC: 32.5 G/DL (ref 31.4–37.4)
MCV RBC AUTO: 102 FL (ref 82–98)
MONOCYTES # BLD AUTO: 0.73 THOUSAND/ΜL (ref 0.17–1.22)
MONOCYTES NFR BLD AUTO: 8 % (ref 4–12)
NEUTROPHILS # BLD AUTO: 5.16 THOUSANDS/ΜL (ref 1.85–7.62)
NEUTS SEG NFR BLD AUTO: 59 % (ref 43–75)
NRBC BLD AUTO-RTO: 0 /100 WBCS
PLATELET # BLD AUTO: 175 THOUSANDS/UL (ref 149–390)
PMV BLD AUTO: 10.7 FL (ref 8.9–12.7)
POTASSIUM SERPL-SCNC: 4.1 MMOL/L (ref 3.5–5.3)
PROT SERPL-MCNC: 6.9 G/DL (ref 6.4–8.2)
RBC # BLD AUTO: 3.8 MILLION/UL (ref 3.88–5.62)
SODIUM SERPL-SCNC: 136 MMOL/L (ref 136–145)
WBC # BLD AUTO: 8.73 THOUSAND/UL (ref 4.31–10.16)

## 2020-06-25 PROCEDURE — 85025 COMPLETE CBC W/AUTO DIFF WBC: CPT

## 2020-06-25 PROCEDURE — 80053 COMPREHEN METABOLIC PANEL: CPT

## 2020-06-25 PROCEDURE — 83036 HEMOGLOBIN GLYCOSYLATED A1C: CPT

## 2020-06-25 PROCEDURE — 36415 COLL VENOUS BLD VENIPUNCTURE: CPT

## 2020-06-30 ENCOUNTER — OFFICE VISIT (OUTPATIENT)
Dept: FAMILY MEDICINE CLINIC | Facility: CLINIC | Age: 85
End: 2020-06-30
Payer: MEDICARE

## 2020-06-30 VITALS
DIASTOLIC BLOOD PRESSURE: 70 MMHG | SYSTOLIC BLOOD PRESSURE: 124 MMHG | WEIGHT: 230 LBS | TEMPERATURE: 97.9 F | HEART RATE: 76 BPM | OXYGEN SATURATION: 98 % | BODY MASS INDEX: 34.97 KG/M2

## 2020-06-30 DIAGNOSIS — E78.5 HYPERLIPIDEMIA, UNSPECIFIED HYPERLIPIDEMIA TYPE: ICD-10-CM

## 2020-06-30 DIAGNOSIS — I10 BENIGN ESSENTIAL HYPERTENSION: ICD-10-CM

## 2020-06-30 DIAGNOSIS — I50.32 CHRONIC DIASTOLIC HEART FAILURE DUE TO VALVULAR DISEASE (HCC): ICD-10-CM

## 2020-06-30 DIAGNOSIS — E13.42 DIABETIC POLYNEUROPATHY ASSOCIATED WITH OTHER SPECIFIED DIABETES MELLITUS (HCC): Primary | ICD-10-CM

## 2020-06-30 DIAGNOSIS — I38 CHRONIC DIASTOLIC HEART FAILURE DUE TO VALVULAR DISEASE (HCC): ICD-10-CM

## 2020-06-30 PROCEDURE — 4010F ACE/ARB THERAPY RXD/TAKEN: CPT | Performed by: INTERNAL MEDICINE

## 2020-06-30 PROCEDURE — 99214 OFFICE O/P EST MOD 30 MIN: CPT | Performed by: INTERNAL MEDICINE

## 2020-06-30 PROCEDURE — 4040F PNEUMOC VAC/ADMIN/RCVD: CPT | Performed by: INTERNAL MEDICINE

## 2020-06-30 PROCEDURE — 3044F HG A1C LEVEL LT 7.0%: CPT | Performed by: INTERNAL MEDICINE

## 2020-06-30 PROCEDURE — 2022F DILAT RTA XM EVC RTNOPTHY: CPT | Performed by: INTERNAL MEDICINE

## 2020-06-30 PROCEDURE — 1036F TOBACCO NON-USER: CPT | Performed by: INTERNAL MEDICINE

## 2020-06-30 PROCEDURE — 3078F DIAST BP <80 MM HG: CPT | Performed by: INTERNAL MEDICINE

## 2020-06-30 PROCEDURE — 1160F RVW MEDS BY RX/DR IN RCRD: CPT | Performed by: INTERNAL MEDICINE

## 2020-06-30 PROCEDURE — 3074F SYST BP LT 130 MM HG: CPT | Performed by: INTERNAL MEDICINE

## 2020-06-30 RX ORDER — BUMETANIDE 1 MG/1
1 TABLET ORAL DAILY
Qty: 90 TABLET | Refills: 1 | Status: SHIPPED | OUTPATIENT
Start: 2020-06-30 | End: 2020-09-16 | Stop reason: SDUPTHER

## 2020-06-30 RX ORDER — ATORVASTATIN CALCIUM 20 MG/1
20 TABLET, FILM COATED ORAL DAILY
Qty: 90 TABLET | Refills: 1 | Status: SHIPPED | OUTPATIENT
Start: 2020-06-30 | End: 2020-10-21 | Stop reason: SDUPTHER

## 2020-06-30 RX ORDER — TELMISARTAN 40 MG/1
40 TABLET ORAL DAILY
Qty: 90 TABLET | Refills: 3 | Status: SHIPPED | OUTPATIENT
Start: 2020-06-30 | End: 2021-02-24 | Stop reason: SDUPTHER

## 2020-06-30 NOTE — PROGRESS NOTES
Assessment/Plan:      Diet reviewed  Lifestyle modifications reviewed  Medications reviewed and ordered  Laboratory tests and studies reviewed and ordered  All patient's questions answered to patient satisfaction  Diagnoses and all orders for this visit:    Diabetic polyneuropathy associated with other specified diabetes mellitus ([facility] )  -     POCT hemoglobin A1c    Hyperlipidemia, unspecified hyperlipidemia type  -     atorvastatin (LIPITOR) 20 mg tablet; Take 1 tablet (20 mg total) by mouth daily    Chronic diastolic heart failure due to valvular disease (HCC)  -     bumetanide (BUMEX) 1 mg tablet; Take 1 tablet (1 mg total) by mouth daily  -     Basic metabolic panel; Future  -     CBC and differential; Future    Benign essential hypertension  -     telmisartan (MICARDIS) 40 mg tablet; Take 1 tablet (40 mg total) by mouth daily        Chief Complaint   Patient presents with    Results     had recent lab work    Diabetes     chronic,runs up and down at home         Subjective:     80yo m w/:    Chromic diast chf,  Tissue avr   Water rest for hyponat now 136  ckd 2 cr 1 25  dm2 well controlled, 6 6       Patient ID: Cheryll Halsted is a 81 y o  male          Current Outpatient Medications:     allopurinol (ZYLOPRIM) 100 mg tablet, TAKE 1 TABLET DAILY, Disp: 90 tablet, Rfl: 0    amLODIPine (NORVASC) 2 5 mg tablet, TAKE 1 TABLET BY MOUTH EVERY DAY, Disp: 90 tablet, Rfl: 0    amoxicillin (AMOXIL) 500 mg capsule, Take 4 tabs 1 hour prior to dental work, Disp: 16 capsule, Rfl: 3    atorvastatin (LIPITOR) 20 mg tablet, Take 1 tablet (20 mg total) by mouth daily, Disp: 90 tablet, Rfl: 1    Blood Glucose Monitoring Suppl (ONE TOUCH ULTRA 2) w/Device KIT, by Does not apply route daily To test blood sugar 1x daily DX:diabetes, Disp: 1 each, Rfl: 0    bumetanide (BUMEX) 1 mg tablet, Take 1 tablet (1 mg total) by mouth daily, Disp: 90 tablet, Rfl: 1    doxazosin (CARDURA) 4 mg tablet, TAKE 1 TABLET BY MOUTH DAILY AT BEDTIME, Disp: 90 tablet, Rfl: 1    fluticasone (FLONASE) 50 mcg/act nasal spray, 1 spray into each nostril 2 (two) times a day, Disp: 1 Bottle, Rfl: 11    glucose blood (ONE TOUCH ULTRA TEST) test strip, Test blood sugar once daily, Disp: 100 each, Rfl: 6    ipratropium (ATROVENT) 0 03 % nasal spray, 1 spray into each nostril 3 (three) times a day, Disp: 30 mL, Rfl: 11    Lancets (ONETOUCH ULTRASOFT) lancets, To test blood sugar 1x daily, Disp: 100 each, Rfl: 3    metFORMIN (GLUCOPHAGE) 500 mg tablet, Take 1 tablet (500 mg total) by mouth 2 (two) times a day with meals, Disp: 60 tablet, Rfl: 5    mometasone (ELOCON) 0 1 % lotion, One drop affected ear canal daily at bedtime when necessary itching, Disp: 60 mL, Rfl: 0    mometasone (ELOCON) 0 1 % lotion, One drop affected ear canal daily at bedtime when necessary itching, Disp: 60 mL, Rfl: 0    nitroglycerin (NITROSTAT) 0 4 mg SL tablet, Place 1 tablet (0 4 mg total) under the tongue every 5 (five) minutes as needed for chest pain, Disp: 90 tablet, Rfl: 1    pantoprazole (PROTONIX) 40 mg tablet, Take 1 tablet (40 mg total) by mouth daily at bedtime, Disp: 90 tablet, Rfl: 1    telmisartan (MICARDIS) 40 mg tablet, Take 1 tablet (40 mg total) by mouth daily, Disp: 90 tablet, Rfl: 3    Zoster Vac Recomb Adjuvanted (Shingrix) 50 MCG/0 5ML SUSR, Shingrix (PF) 50 mcg/0 5 mL intramuscular suspension, kit, Disp: , Rfl:     The following portions of the patient's history were reviewed and updated as appropriate: allergies, current medications, past family history, past medical history, past social history, past surgical history and problem list     Review of Systems   Constitutional: Negative for appetite change, fatigue, fever and unexpected weight change  HENT: Negative for rhinorrhea, sinus pressure, sinus pain, sneezing and sore throat  Eyes: Negative for visual disturbance     Respiratory: Negative for cough, chest tightness, shortness of breath and wheezing  Cardiovascular: Negative for chest pain, palpitations and leg swelling  Gastrointestinal: Negative for abdominal distention, abdominal pain, blood in stool, constipation, diarrhea, nausea and vomiting  Endocrine: Negative for polydipsia and polyuria  Genitourinary: Negative for decreased urine volume, difficulty urinating, dysuria, hematuria and urgency  Musculoskeletal: Negative for arthralgias, back pain, joint swelling and neck pain  Skin: Negative for rash  Allergic/Immunologic: Negative for environmental allergies  Neurological: Negative for tremors, weakness, light-headedness, numbness and headaches  Hematological: Does not bruise/bleed easily  Psychiatric/Behavioral: Negative for agitation, behavioral problems, confusion and dysphoric mood  The patient is not nervous/anxious            Family History   Problem Relation Age of Onset    Diabetes Mother     Alcohol abuse Brother     Bipolar disorder Brother     Hypertension Brother     Kidney disease Brother     Heart disease Family     Diabetes type II Family        Past Medical History:   Diagnosis Date    Aortic valve regurgitation 01/26/2017    Arthritis     Cancer [facility] )     Coronary artery disease     Coronary artery disease with angina pectoris ([facility] ) 3/19/2019    Edema 12/02/2016    Heart disease     Heart valve disorder     Heart valve replaced     History of basal cell cancer     Hx of tissue graft 03/29/2017    Hyperlipemia 01/26/2017    Hypertension     Non-rheumatic mitral regurgitation 10/21/2016    Right bundle branch block (RBBB), 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       Past Surgical History:   Procedure Laterality Date    APPENDECTOMY      CARDIAC SURGERY      CARDIAC VALVE REPLACEMENT      EYE SURGERY      HERNIA REPAIR      VASECTOMY         Social History     Socioeconomic History    Marital status: /Civil Union     Spouse name: None    Number of children: 3    Years of education: None    Highest education level: None   Occupational History    Occupation: retired   Social Needs    Financial resource strain: None    Food insecurity:     Worry: None     Inability: None    Transportation needs:     Medical: None     Non-medical: None   Tobacco Use    Smoking status: Former Smoker     Types: Cigarettes, Cigars    Smokeless tobacco: Former User    Tobacco comment: quit 10 years ago, NEVER A SMOKER AS PER N[employer]GEN   Substance and Sexual Activity    Alcohol use: No    Drug use: No    Sexual activity: Yes     Partners: Female   Lifestyle    Physical activity:     Days per week: None     Minutes per session: None    Stress: None   Relationships    Social connections:     Talks on phone: None     Gets together: None     Attends Ep[employer] service: None     Active member of club or organization: None     Attends meetings of clubs or organizations: None     Relationship status: None    Intimate partner violence:     Fear of current or ex partner: None     Emotionally abused: None     Physically abused: None     Forced sexual activity: None   Other Topics Concern    None   Social History Narrative    PT states he does not drink any caffeine        Allergies   Allergen Reactions    Orphenadrine GI Intolerance         Objective:    /70   Pulse 76   Temp 97 9 °F (36 6 °C)   Wt 104 kg (230 lb)   SpO2 98%   BMI 34 97 kg/m²        Physical Exam   Constitutional: He is oriented to person, place, and time  He appears well-developed and well-nourished  No distress  HENT:   Head: Normocephalic and atraumatic  Nose: Nose normal    Mouth/Throat: Oropharynx is clear and moist  No oropharyngeal exudate  Eyes: Pupils are equal, round, and reactive to light  Conjunctivae and EOM are normal  No scleral icterus  Neck: Normal range of motion  Neck supple  No JVD present  No tracheal deviation present  No thyromegaly present  Cardiovascular: Normal rate and regular rhythm  Exam reveals no gallop and no friction rub  Murmur heard  Pulmonary/Chest: Effort normal  No respiratory distress  He has no wheezes  He has no rales  He exhibits no tenderness  Abdominal: Soft  Bowel sounds are normal  He exhibits no distension and no mass  There is no tenderness  There is no rebound and no guarding  Musculoskeletal: Normal range of motion  He exhibits edema (mild)  He exhibits no deformity  Lymphadenopathy:     He has no cervical adenopathy  Neurological: He is alert and oriented to person, place, and time  No cranial nerve deficit  Coordination normal    Skin: Skin is warm and dry  No rash noted  Psychiatric: He has a normal mood and affect   His behavior is normal  Judgment and thought content normal

## 2020-07-06 DIAGNOSIS — I38 CHRONIC DIASTOLIC HEART FAILURE DUE TO VALVULAR DISEASE (HCC): ICD-10-CM

## 2020-07-06 DIAGNOSIS — I50.32 CHRONIC DIASTOLIC HEART FAILURE DUE TO VALVULAR DISEASE (HCC): ICD-10-CM

## 2020-07-07 RX ORDER — AMLODIPINE BESYLATE 2.5 MG/1
2.5 TABLET ORAL DAILY
Qty: 90 TABLET | Refills: 3 | Status: SHIPPED | OUTPATIENT
Start: 2020-07-07 | End: 2020-10-05 | Stop reason: SDUPTHER

## 2020-07-21 DIAGNOSIS — I38 CHRONIC DIASTOLIC HEART FAILURE DUE TO VALVULAR DISEASE (HCC): ICD-10-CM

## 2020-07-21 DIAGNOSIS — K21.00 GERD WITH ESOPHAGITIS: ICD-10-CM

## 2020-07-21 DIAGNOSIS — I50.32 CHRONIC DIASTOLIC HEART FAILURE DUE TO VALVULAR DISEASE (HCC): ICD-10-CM

## 2020-07-21 DIAGNOSIS — I10 HYPERTENSION, UNSPECIFIED TYPE: ICD-10-CM

## 2020-07-21 RX ORDER — DOXAZOSIN MESYLATE 4 MG/1
4 TABLET ORAL
Qty: 90 TABLET | Refills: 2 | Status: SHIPPED | OUTPATIENT
Start: 2020-07-21 | End: 2020-12-30 | Stop reason: SDUPTHER

## 2020-09-08 DIAGNOSIS — I10 HYPERTENSION, UNSPECIFIED TYPE: ICD-10-CM

## 2020-09-08 RX ORDER — ALLOPURINOL 100 MG/1
100 TABLET ORAL DAILY
Qty: 90 TABLET | Refills: 2 | Status: SHIPPED | OUTPATIENT
Start: 2020-09-08 | End: 2021-01-11 | Stop reason: SDUPTHER

## 2020-09-16 DIAGNOSIS — I50.32 CHRONIC DIASTOLIC HEART FAILURE DUE TO VALVULAR DISEASE (HCC): ICD-10-CM

## 2020-09-16 DIAGNOSIS — I38 CHRONIC DIASTOLIC HEART FAILURE DUE TO VALVULAR DISEASE (HCC): ICD-10-CM

## 2020-09-16 RX ORDER — BUMETANIDE 1 MG/1
1 TABLET ORAL DAILY
Qty: 90 TABLET | Refills: 2 | Status: SHIPPED | OUTPATIENT
Start: 2020-09-16 | End: 2021-03-12 | Stop reason: SDUPTHER

## 2020-10-02 ENCOUNTER — APPOINTMENT (OUTPATIENT)
Dept: LAB | Age: 85
End: 2020-10-02
Payer: MEDICARE

## 2020-10-02 DIAGNOSIS — I38 CHRONIC DIASTOLIC HEART FAILURE DUE TO VALVULAR DISEASE (HCC): ICD-10-CM

## 2020-10-02 DIAGNOSIS — I50.32 CHRONIC DIASTOLIC HEART FAILURE DUE TO VALVULAR DISEASE (HCC): ICD-10-CM

## 2020-10-02 LAB
ANION GAP SERPL CALCULATED.3IONS-SCNC: 5 MMOL/L (ref 4–13)
BASOPHILS # BLD AUTO: 0.05 THOUSANDS/ΜL (ref 0–0.1)
BASOPHILS NFR BLD AUTO: 1 % (ref 0–1)
BUN SERPL-MCNC: 29 MG/DL (ref 5–25)
CALCIUM SERPL-MCNC: 8.9 MG/DL (ref 8.3–10.1)
CHLORIDE SERPL-SCNC: 106 MMOL/L (ref 100–108)
CO2 SERPL-SCNC: 29 MMOL/L (ref 21–32)
CREAT SERPL-MCNC: 1.16 MG/DL (ref 0.6–1.3)
EOSINOPHIL # BLD AUTO: 0.42 THOUSAND/ΜL (ref 0–0.61)
EOSINOPHIL NFR BLD AUTO: 5 % (ref 0–6)
ERYTHROCYTE [DISTWIDTH] IN BLOOD BY AUTOMATED COUNT: 12.6 % (ref 11.6–15.1)
GFR SERPL CREATININE-BSD FRML MDRD: 58 ML/MIN/1.73SQ M
GLUCOSE P FAST SERPL-MCNC: 133 MG/DL (ref 65–99)
HCT VFR BLD AUTO: 36.5 % (ref 36.5–49.3)
HGB BLD-MCNC: 12.4 G/DL (ref 12–17)
IMM GRANULOCYTES # BLD AUTO: 0.06 THOUSAND/UL (ref 0–0.2)
IMM GRANULOCYTES NFR BLD AUTO: 1 % (ref 0–2)
LYMPHOCYTES # BLD AUTO: 2.27 THOUSANDS/ΜL (ref 0.6–4.47)
LYMPHOCYTES NFR BLD AUTO: 25 % (ref 14–44)
MCH RBC QN AUTO: 33.9 PG (ref 26.8–34.3)
MCHC RBC AUTO-ENTMCNC: 34 G/DL (ref 31.4–37.4)
MCV RBC AUTO: 100 FL (ref 82–98)
MONOCYTES # BLD AUTO: 0.85 THOUSAND/ΜL (ref 0.17–1.22)
MONOCYTES NFR BLD AUTO: 9 % (ref 4–12)
NEUTROPHILS # BLD AUTO: 5.36 THOUSANDS/ΜL (ref 1.85–7.62)
NEUTS SEG NFR BLD AUTO: 59 % (ref 43–75)
NRBC BLD AUTO-RTO: 0 /100 WBCS
PLATELET # BLD AUTO: 165 THOUSANDS/UL (ref 149–390)
PMV BLD AUTO: 10.5 FL (ref 8.9–12.7)
POTASSIUM SERPL-SCNC: 4.3 MMOL/L (ref 3.5–5.3)
RBC # BLD AUTO: 3.66 MILLION/UL (ref 3.88–5.62)
SODIUM SERPL-SCNC: 140 MMOL/L (ref 136–145)
WBC # BLD AUTO: 9.01 THOUSAND/UL (ref 4.31–10.16)

## 2020-10-02 PROCEDURE — 85025 COMPLETE CBC W/AUTO DIFF WBC: CPT

## 2020-10-02 PROCEDURE — 36415 COLL VENOUS BLD VENIPUNCTURE: CPT

## 2020-10-02 PROCEDURE — 80048 BASIC METABOLIC PNL TOTAL CA: CPT

## 2020-10-05 DIAGNOSIS — I38 CHRONIC DIASTOLIC HEART FAILURE DUE TO VALVULAR DISEASE (HCC): ICD-10-CM

## 2020-10-05 DIAGNOSIS — I50.32 CHRONIC DIASTOLIC HEART FAILURE DUE TO VALVULAR DISEASE (HCC): ICD-10-CM

## 2020-10-05 RX ORDER — METFORMIN HYDROCHLORIDE 500 MG/1
500 TABLET, EXTENDED RELEASE ORAL
COMMUNITY
Start: 2020-08-07 | End: 2021-02-12

## 2020-10-05 RX ORDER — AMLODIPINE BESYLATE 2.5 MG/1
2.5 TABLET ORAL DAILY
Qty: 90 TABLET | Refills: 0 | Status: SHIPPED | OUTPATIENT
Start: 2020-10-05 | End: 2021-03-12 | Stop reason: SDUPTHER

## 2020-10-06 ENCOUNTER — OFFICE VISIT (OUTPATIENT)
Dept: FAMILY MEDICINE CLINIC | Facility: CLINIC | Age: 85
End: 2020-10-06
Payer: MEDICARE

## 2020-10-06 VITALS
SYSTOLIC BLOOD PRESSURE: 140 MMHG | HEART RATE: 61 BPM | BODY MASS INDEX: 35.28 KG/M2 | HEIGHT: 68 IN | RESPIRATION RATE: 18 BRPM | TEMPERATURE: 97.9 F | OXYGEN SATURATION: 97 % | WEIGHT: 232.8 LBS | DIASTOLIC BLOOD PRESSURE: 60 MMHG

## 2020-10-06 DIAGNOSIS — Z95.2 HISTORY OF AORTIC VALVE REPLACEMENT: ICD-10-CM

## 2020-10-06 DIAGNOSIS — I50.32 CHRONIC DIASTOLIC CONGESTIVE HEART FAILURE, NYHA CLASS 2 (HCC): ICD-10-CM

## 2020-10-06 DIAGNOSIS — E87.1 HYPONATREMIA WITH EXCESS EXTRACELLULAR FLUID VOLUME: ICD-10-CM

## 2020-10-06 DIAGNOSIS — Z95.4 HISTORY OF HEART VALVE REPLACEMENT WITH TISSUE GRAFT: ICD-10-CM

## 2020-10-06 DIAGNOSIS — Z00.00 MEDICARE ANNUAL WELLNESS VISIT, SUBSEQUENT: Primary | ICD-10-CM

## 2020-10-06 DIAGNOSIS — I25.10 MULTIPLE VESSEL CORONARY ARTERY DISEASE: ICD-10-CM

## 2020-10-06 DIAGNOSIS — T82.897D AORTIC PROSTHETIC VALVE REGURGITATION, SUBSEQUENT ENCOUNTER: ICD-10-CM

## 2020-10-06 DIAGNOSIS — R60.9 PERIPHERAL EDEMA: ICD-10-CM

## 2020-10-06 DIAGNOSIS — I34.0 NON-RHEUMATIC MITRAL REGURGITATION: ICD-10-CM

## 2020-10-06 DIAGNOSIS — E13.42 DIABETIC POLYNEUROPATHY ASSOCIATED WITH OTHER SPECIFIED DIABETES MELLITUS (HCC): ICD-10-CM

## 2020-10-06 DIAGNOSIS — I70.201 ATHEROSCLEROSIS OF NATIVE ARTERY OF RIGHT LOWER EXTREMITY, WITH UNSPECIFIED PRESENCE OF CLINICAL MANIFESTATION (HCC): ICD-10-CM

## 2020-10-06 PROBLEM — I70.25 ATHEROSCLEROSIS OF NATIVE ARTERIES OF THE EXTREMITIES WITH ULCERATION (HCC): Status: ACTIVE | Noted: 2020-01-17

## 2020-10-06 LAB — SL AMB POCT HEMOGLOBIN AIC: 6.5 (ref ?–6.5)

## 2020-10-06 PROCEDURE — 83036 HEMOGLOBIN GLYCOSYLATED A1C: CPT | Performed by: INTERNAL MEDICINE

## 2020-10-06 PROCEDURE — 99214 OFFICE O/P EST MOD 30 MIN: CPT | Performed by: INTERNAL MEDICINE

## 2020-10-06 PROCEDURE — G0439 PPPS, SUBSEQ VISIT: HCPCS | Performed by: INTERNAL MEDICINE

## 2020-10-21 DIAGNOSIS — E78.5 HYPERLIPIDEMIA, UNSPECIFIED HYPERLIPIDEMIA TYPE: ICD-10-CM

## 2020-10-21 RX ORDER — ATORVASTATIN CALCIUM 20 MG/1
20 TABLET, FILM COATED ORAL DAILY
Qty: 90 TABLET | Refills: 1 | Status: SHIPPED | OUTPATIENT
Start: 2020-10-21 | End: 2021-01-22

## 2020-11-10 LAB
LEFT EYE DIABETIC RETINOPATHY: NORMAL
RIGHT EYE DIABETIC RETINOPATHY: NORMAL

## 2020-12-21 ENCOUNTER — LAB (OUTPATIENT)
Dept: LAB | Age: 85
End: 2020-12-21
Payer: MEDICARE

## 2020-12-21 DIAGNOSIS — E13.42 DIABETIC POLYNEUROPATHY ASSOCIATED WITH OTHER SPECIFIED DIABETES MELLITUS (HCC): ICD-10-CM

## 2020-12-21 DIAGNOSIS — E87.1 HYPONATREMIA WITH EXCESS EXTRACELLULAR FLUID VOLUME: ICD-10-CM

## 2020-12-21 DIAGNOSIS — I50.32 CHRONIC DIASTOLIC CONGESTIVE HEART FAILURE, NYHA CLASS 2 (HCC): ICD-10-CM

## 2020-12-21 LAB
ALBUMIN SERPL BCP-MCNC: 3.2 G/DL (ref 3.5–5)
ALP SERPL-CCNC: 76 U/L (ref 46–116)
ALT SERPL W P-5'-P-CCNC: 53 U/L (ref 12–78)
ANION GAP SERPL CALCULATED.3IONS-SCNC: 5 MMOL/L (ref 4–13)
AST SERPL W P-5'-P-CCNC: 43 U/L (ref 5–45)
BASOPHILS # BLD AUTO: 0.05 THOUSANDS/ΜL (ref 0–0.1)
BASOPHILS NFR BLD AUTO: 1 % (ref 0–1)
BILIRUB SERPL-MCNC: 0.55 MG/DL (ref 0.2–1)
BUN SERPL-MCNC: 28 MG/DL (ref 5–25)
CALCIUM ALBUM COR SERPL-MCNC: 10 MG/DL (ref 8.3–10.1)
CALCIUM SERPL-MCNC: 9.4 MG/DL (ref 8.3–10.1)
CHLORIDE SERPL-SCNC: 104 MMOL/L (ref 100–108)
CO2 SERPL-SCNC: 29 MMOL/L (ref 21–32)
CREAT SERPL-MCNC: 1.12 MG/DL (ref 0.6–1.3)
EOSINOPHIL # BLD AUTO: 0.38 THOUSAND/ΜL (ref 0–0.61)
EOSINOPHIL NFR BLD AUTO: 4 % (ref 0–6)
ERYTHROCYTE [DISTWIDTH] IN BLOOD BY AUTOMATED COUNT: 12.6 % (ref 11.6–15.1)
EST. AVERAGE GLUCOSE BLD GHB EST-MCNC: 143 MG/DL
GFR SERPL CREATININE-BSD FRML MDRD: 60 ML/MIN/1.73SQ M
GLUCOSE P FAST SERPL-MCNC: 146 MG/DL (ref 65–99)
HBA1C MFR BLD: 6.6 %
HCT VFR BLD AUTO: 36.9 % (ref 36.5–49.3)
HGB BLD-MCNC: 12.3 G/DL (ref 12–17)
IMM GRANULOCYTES # BLD AUTO: 0.04 THOUSAND/UL (ref 0–0.2)
IMM GRANULOCYTES NFR BLD AUTO: 0 % (ref 0–2)
LYMPHOCYTES # BLD AUTO: 2.15 THOUSANDS/ΜL (ref 0.6–4.47)
LYMPHOCYTES NFR BLD AUTO: 24 % (ref 14–44)
MCH RBC QN AUTO: 33.5 PG (ref 26.8–34.3)
MCHC RBC AUTO-ENTMCNC: 33.3 G/DL (ref 31.4–37.4)
MCV RBC AUTO: 101 FL (ref 82–98)
MONOCYTES # BLD AUTO: 0.91 THOUSAND/ΜL (ref 0.17–1.22)
MONOCYTES NFR BLD AUTO: 10 % (ref 4–12)
NEUTROPHILS # BLD AUTO: 5.37 THOUSANDS/ΜL (ref 1.85–7.62)
NEUTS SEG NFR BLD AUTO: 61 % (ref 43–75)
NRBC BLD AUTO-RTO: 0 /100 WBCS
PLATELET # BLD AUTO: 165 THOUSANDS/UL (ref 149–390)
PMV BLD AUTO: 10.2 FL (ref 8.9–12.7)
POTASSIUM SERPL-SCNC: 4.3 MMOL/L (ref 3.5–5.3)
PROT SERPL-MCNC: 6.8 G/DL (ref 6.4–8.2)
RBC # BLD AUTO: 3.67 MILLION/UL (ref 3.88–5.62)
SODIUM SERPL-SCNC: 138 MMOL/L (ref 136–145)
WBC # BLD AUTO: 8.9 THOUSAND/UL (ref 4.31–10.16)

## 2020-12-21 PROCEDURE — 83036 HEMOGLOBIN GLYCOSYLATED A1C: CPT

## 2020-12-21 PROCEDURE — 36415 COLL VENOUS BLD VENIPUNCTURE: CPT

## 2020-12-21 PROCEDURE — 85025 COMPLETE CBC W/AUTO DIFF WBC: CPT

## 2020-12-21 PROCEDURE — 80053 COMPREHEN METABOLIC PANEL: CPT

## 2020-12-22 ENCOUNTER — OFFICE VISIT (OUTPATIENT)
Dept: FAMILY MEDICINE CLINIC | Facility: CLINIC | Age: 85
End: 2020-12-22
Payer: MEDICARE

## 2020-12-22 VITALS
HEIGHT: 68 IN | HEART RATE: 71 BPM | RESPIRATION RATE: 16 BRPM | BODY MASS INDEX: 35.25 KG/M2 | SYSTOLIC BLOOD PRESSURE: 120 MMHG | TEMPERATURE: 97.9 F | OXYGEN SATURATION: 96 % | WEIGHT: 232.6 LBS | DIASTOLIC BLOOD PRESSURE: 54 MMHG

## 2020-12-22 DIAGNOSIS — I50.32 CHRONIC DIASTOLIC CONGESTIVE HEART FAILURE, NYHA CLASS 2 (HCC): ICD-10-CM

## 2020-12-22 DIAGNOSIS — E78.00 PURE HYPERCHOLESTEROLEMIA: ICD-10-CM

## 2020-12-22 DIAGNOSIS — E66.01 SEVERE OBESITY (BMI 35.0-35.9 WITH COMORBIDITY) (HCC): ICD-10-CM

## 2020-12-22 DIAGNOSIS — E08.42 DIABETIC POLYNEUROPATHY ASSOCIATED WITH DIABETES MELLITUS DUE TO UNDERLYING CONDITION (HCC): ICD-10-CM

## 2020-12-22 DIAGNOSIS — E87.1 HYPONATREMIA WITH EXCESS EXTRACELLULAR FLUID VOLUME: ICD-10-CM

## 2020-12-22 DIAGNOSIS — I45.2 RIGHT BUNDLE BRANCH BLOCK WITH LEFT ANTERIOR FASCICULAR BLOCK: Primary | ICD-10-CM

## 2020-12-22 DIAGNOSIS — Z95.4 HISTORY OF HEART VALVE REPLACEMENT WITH TISSUE GRAFT: ICD-10-CM

## 2020-12-22 DIAGNOSIS — Z95.2 HISTORY OF AORTIC VALVE REPLACEMENT: ICD-10-CM

## 2020-12-22 PROBLEM — I73.9 PERIPHERAL ARTERY INSUFFICIENCY (HCC): Status: ACTIVE | Noted: 2020-12-18

## 2020-12-22 PROCEDURE — 93000 ELECTROCARDIOGRAM COMPLETE: CPT | Performed by: INTERNAL MEDICINE

## 2020-12-22 PROCEDURE — 99214 OFFICE O/P EST MOD 30 MIN: CPT | Performed by: INTERNAL MEDICINE

## 2020-12-30 DIAGNOSIS — K21.00 GERD WITH ESOPHAGITIS: ICD-10-CM

## 2020-12-30 DIAGNOSIS — I38 CHRONIC DIASTOLIC HEART FAILURE DUE TO VALVULAR DISEASE (HCC): ICD-10-CM

## 2020-12-30 DIAGNOSIS — I10 HYPERTENSION, UNSPECIFIED TYPE: ICD-10-CM

## 2020-12-30 DIAGNOSIS — I50.32 CHRONIC DIASTOLIC HEART FAILURE DUE TO VALVULAR DISEASE (HCC): ICD-10-CM

## 2020-12-30 RX ORDER — DOXAZOSIN MESYLATE 4 MG/1
4 TABLET ORAL
Qty: 90 TABLET | Refills: 0 | Status: SHIPPED | OUTPATIENT
Start: 2020-12-30 | End: 2021-05-17 | Stop reason: SDUPTHER

## 2021-01-11 DIAGNOSIS — I10 HYPERTENSION, UNSPECIFIED TYPE: ICD-10-CM

## 2021-01-11 RX ORDER — ALLOPURINOL 100 MG/1
100 TABLET ORAL DAILY
Qty: 90 TABLET | Refills: 0 | Status: SHIPPED | OUTPATIENT
Start: 2021-01-11 | End: 2021-03-12 | Stop reason: SDUPTHER

## 2021-01-11 NOTE — TELEPHONE ENCOUNTER
Mail order did not come and patient ran out of script so mail order was canceled and needs sent to local today  Please advise if unable to do   Thank you

## 2021-01-18 ENCOUNTER — IMMUNIZATIONS (OUTPATIENT)
Dept: FAMILY MEDICINE CLINIC | Facility: HOSPITAL | Age: 86
End: 2021-01-18

## 2021-01-18 DIAGNOSIS — Z23 ENCOUNTER FOR IMMUNIZATION: Primary | ICD-10-CM

## 2021-01-18 PROCEDURE — 0011A SARS-COV-2 / COVID-19 MRNA VACCINE (MODERNA) 100 MCG: CPT

## 2021-01-18 PROCEDURE — 91301 SARS-COV-2 / COVID-19 MRNA VACCINE (MODERNA) 100 MCG: CPT

## 2021-01-22 DIAGNOSIS — E78.5 HYPERLIPIDEMIA, UNSPECIFIED HYPERLIPIDEMIA TYPE: ICD-10-CM

## 2021-01-22 RX ORDER — ATORVASTATIN CALCIUM 20 MG/1
TABLET, FILM COATED ORAL
Qty: 90 TABLET | Refills: 1 | Status: SHIPPED | OUTPATIENT
Start: 2021-01-22 | End: 2021-03-12 | Stop reason: SDUPTHER

## 2021-02-02 DIAGNOSIS — E11.9 TYPE 2 DIABETES MELLITUS WITHOUT COMPLICATION, WITHOUT LONG-TERM CURRENT USE OF INSULIN (HCC): ICD-10-CM

## 2021-02-02 RX ORDER — LANCETS 33 GAUGE
EACH MISCELLANEOUS
Qty: 100 EACH | Refills: 6 | Status: SHIPPED | OUTPATIENT
Start: 2021-02-02 | End: 2022-02-22 | Stop reason: SDUPTHER

## 2021-02-12 DIAGNOSIS — E11.9 TYPE 2 DIABETES MELLITUS WITHOUT COMPLICATION, WITHOUT LONG-TERM CURRENT USE OF INSULIN (HCC): Primary | ICD-10-CM

## 2021-02-12 RX ORDER — METFORMIN HYDROCHLORIDE 500 MG/1
TABLET, EXTENDED RELEASE ORAL
Qty: 90 TABLET | Refills: 3 | Status: SHIPPED | OUTPATIENT
Start: 2021-02-12 | End: 2021-12-30 | Stop reason: SDUPTHER

## 2021-02-15 ENCOUNTER — IMMUNIZATIONS (OUTPATIENT)
Dept: FAMILY MEDICINE CLINIC | Facility: HOSPITAL | Age: 86
End: 2021-02-15

## 2021-02-15 DIAGNOSIS — Z23 ENCOUNTER FOR IMMUNIZATION: Primary | ICD-10-CM

## 2021-02-15 PROCEDURE — 91301 SARS-COV-2 / COVID-19 MRNA VACCINE (MODERNA) 100 MCG: CPT

## 2021-02-15 PROCEDURE — 0012A SARS-COV-2 / COVID-19 MRNA VACCINE (MODERNA) 100 MCG: CPT

## 2021-02-24 DIAGNOSIS — I10 BENIGN ESSENTIAL HYPERTENSION: ICD-10-CM

## 2021-02-25 RX ORDER — TELMISARTAN 40 MG/1
40 TABLET ORAL DAILY
Qty: 90 TABLET | Refills: 3 | Status: SHIPPED | OUTPATIENT
Start: 2021-02-25 | End: 2022-04-13 | Stop reason: SDUPTHER

## 2021-03-12 DIAGNOSIS — E11.9 TYPE 2 DIABETES MELLITUS WITHOUT COMPLICATION, WITHOUT LONG-TERM CURRENT USE OF INSULIN (HCC): ICD-10-CM

## 2021-03-12 DIAGNOSIS — I10 HYPERTENSION, UNSPECIFIED TYPE: ICD-10-CM

## 2021-03-12 DIAGNOSIS — I50.32 CHRONIC DIASTOLIC HEART FAILURE DUE TO VALVULAR DISEASE (HCC): ICD-10-CM

## 2021-03-12 DIAGNOSIS — I38 CHRONIC DIASTOLIC HEART FAILURE DUE TO VALVULAR DISEASE (HCC): ICD-10-CM

## 2021-03-12 DIAGNOSIS — E78.5 HYPERLIPIDEMIA, UNSPECIFIED HYPERLIPIDEMIA TYPE: ICD-10-CM

## 2021-03-12 DIAGNOSIS — I11.0 HYPERTENSIVE HEART DISEASE WITH CONGESTIVE HEART FAILURE, UNSPECIFIED HEART FAILURE TYPE (HCC): Primary | ICD-10-CM

## 2021-03-12 RX ORDER — BUMETANIDE 1 MG/1
1 TABLET ORAL DAILY
Qty: 90 TABLET | Refills: 2 | Status: SHIPPED | OUTPATIENT
Start: 2021-03-12 | End: 2021-11-15 | Stop reason: SDUPTHER

## 2021-03-12 RX ORDER — AMLODIPINE BESYLATE 2.5 MG/1
2.5 TABLET ORAL DAILY
Qty: 90 TABLET | Refills: 0 | Status: SHIPPED | OUTPATIENT
Start: 2021-03-12 | End: 2021-04-08

## 2021-03-12 RX ORDER — ATORVASTATIN CALCIUM 20 MG/1
20 TABLET, FILM COATED ORAL DAILY
Qty: 90 TABLET | Refills: 1 | Status: SHIPPED | OUTPATIENT
Start: 2021-03-12 | End: 2021-11-15 | Stop reason: SDUPTHER

## 2021-03-12 RX ORDER — ALLOPURINOL 100 MG/1
100 TABLET ORAL DAILY
Qty: 90 TABLET | Refills: 0 | Status: SHIPPED | OUTPATIENT
Start: 2021-03-12 | End: 2021-06-21 | Stop reason: SDUPTHER

## 2021-03-23 ENCOUNTER — LAB (OUTPATIENT)
Dept: LAB | Age: 86
End: 2021-03-23
Payer: MEDICARE

## 2021-03-23 DIAGNOSIS — E78.00 PURE HYPERCHOLESTEROLEMIA: ICD-10-CM

## 2021-03-23 DIAGNOSIS — E08.42 DIABETIC POLYNEUROPATHY ASSOCIATED WITH DIABETES MELLITUS DUE TO UNDERLYING CONDITION (HCC): ICD-10-CM

## 2021-03-23 DIAGNOSIS — I45.2 RIGHT BUNDLE BRANCH BLOCK WITH LEFT ANTERIOR FASCICULAR BLOCK: ICD-10-CM

## 2021-03-23 LAB
ALBUMIN SERPL BCP-MCNC: 3.2 G/DL (ref 3.5–5)
ALP SERPL-CCNC: 80 U/L (ref 46–116)
ALT SERPL W P-5'-P-CCNC: 57 U/L (ref 12–78)
ANION GAP SERPL CALCULATED.3IONS-SCNC: 4 MMOL/L (ref 4–13)
AST SERPL W P-5'-P-CCNC: 51 U/L (ref 5–45)
BASOPHILS # BLD AUTO: 0.05 THOUSANDS/ΜL (ref 0–0.1)
BASOPHILS NFR BLD AUTO: 1 % (ref 0–1)
BILIRUB SERPL-MCNC: 0.61 MG/DL (ref 0.2–1)
BUN SERPL-MCNC: 29 MG/DL (ref 5–25)
CALCIUM ALBUM COR SERPL-MCNC: 9.6 MG/DL (ref 8.3–10.1)
CALCIUM SERPL-MCNC: 9 MG/DL (ref 8.3–10.1)
CHLORIDE SERPL-SCNC: 102 MMOL/L (ref 100–108)
CO2 SERPL-SCNC: 30 MMOL/L (ref 21–32)
CREAT SERPL-MCNC: 1.34 MG/DL (ref 0.6–1.3)
CREAT UR-MCNC: 151 MG/DL
EOSINOPHIL # BLD AUTO: 0.44 THOUSAND/ΜL (ref 0–0.61)
EOSINOPHIL NFR BLD AUTO: 5 % (ref 0–6)
ERYTHROCYTE [DISTWIDTH] IN BLOOD BY AUTOMATED COUNT: 12.9 % (ref 11.6–15.1)
EST. AVERAGE GLUCOSE BLD GHB EST-MCNC: 143 MG/DL
GFR SERPL CREATININE-BSD FRML MDRD: 48 ML/MIN/1.73SQ M
GLUCOSE P FAST SERPL-MCNC: 134 MG/DL (ref 65–99)
HBA1C MFR BLD: 6.6 %
HCT VFR BLD AUTO: 38.1 % (ref 36.5–49.3)
HGB BLD-MCNC: 12.5 G/DL (ref 12–17)
IMM GRANULOCYTES # BLD AUTO: 0.07 THOUSAND/UL (ref 0–0.2)
IMM GRANULOCYTES NFR BLD AUTO: 1 % (ref 0–2)
LDLC SERPL DIRECT ASSAY-MCNC: 74 MG/DL (ref 0–100)
LYMPHOCYTES # BLD AUTO: 2.21 THOUSANDS/ΜL (ref 0.6–4.47)
LYMPHOCYTES NFR BLD AUTO: 24 % (ref 14–44)
MCH RBC QN AUTO: 33.1 PG (ref 26.8–34.3)
MCHC RBC AUTO-ENTMCNC: 32.8 G/DL (ref 31.4–37.4)
MCV RBC AUTO: 101 FL (ref 82–98)
MICROALBUMIN UR-MCNC: 5.3 MG/L (ref 0–20)
MICROALBUMIN/CREAT 24H UR: 4 MG/G CREATININE (ref 0–30)
MONOCYTES # BLD AUTO: 0.85 THOUSAND/ΜL (ref 0.17–1.22)
MONOCYTES NFR BLD AUTO: 9 % (ref 4–12)
NEUTROPHILS # BLD AUTO: 5.45 THOUSANDS/ΜL (ref 1.85–7.62)
NEUTS SEG NFR BLD AUTO: 60 % (ref 43–75)
NRBC BLD AUTO-RTO: 0 /100 WBCS
PLATELET # BLD AUTO: 153 THOUSANDS/UL (ref 149–390)
PMV BLD AUTO: 10.7 FL (ref 8.9–12.7)
POTASSIUM SERPL-SCNC: 4.4 MMOL/L (ref 3.5–5.3)
PROT SERPL-MCNC: 6.9 G/DL (ref 6.4–8.2)
RBC # BLD AUTO: 3.78 MILLION/UL (ref 3.88–5.62)
SODIUM SERPL-SCNC: 136 MMOL/L (ref 136–145)
TRIGL SERPL-MCNC: 162 MG/DL
TSH SERPL DL<=0.05 MIU/L-ACNC: 2.77 UIU/ML (ref 0.36–3.74)
WBC # BLD AUTO: 9.07 THOUSAND/UL (ref 4.31–10.16)

## 2021-03-23 PROCEDURE — 84478 ASSAY OF TRIGLYCERIDES: CPT

## 2021-03-23 PROCEDURE — 80053 COMPREHEN METABOLIC PANEL: CPT

## 2021-03-23 PROCEDURE — 84443 ASSAY THYROID STIM HORMONE: CPT

## 2021-03-23 PROCEDURE — 85025 COMPLETE CBC W/AUTO DIFF WBC: CPT

## 2021-03-23 PROCEDURE — 83721 ASSAY OF BLOOD LIPOPROTEIN: CPT

## 2021-03-23 PROCEDURE — 83036 HEMOGLOBIN GLYCOSYLATED A1C: CPT

## 2021-03-23 PROCEDURE — 36415 COLL VENOUS BLD VENIPUNCTURE: CPT

## 2021-03-23 PROCEDURE — 82570 ASSAY OF URINE CREATININE: CPT

## 2021-03-23 PROCEDURE — 82043 UR ALBUMIN QUANTITATIVE: CPT

## 2021-03-25 ENCOUNTER — OFFICE VISIT (OUTPATIENT)
Dept: FAMILY MEDICINE CLINIC | Facility: CLINIC | Age: 86
End: 2021-03-25
Payer: MEDICARE

## 2021-03-25 VITALS
HEIGHT: 69 IN | DIASTOLIC BLOOD PRESSURE: 60 MMHG | SYSTOLIC BLOOD PRESSURE: 130 MMHG | WEIGHT: 231.4 LBS | BODY MASS INDEX: 34.27 KG/M2 | TEMPERATURE: 98.2 F

## 2021-03-25 DIAGNOSIS — I11.0 HYPERTENSIVE HEART DISEASE WITH CONGESTIVE HEART FAILURE, UNSPECIFIED HEART FAILURE TYPE (HCC): ICD-10-CM

## 2021-03-25 DIAGNOSIS — Z95.4 HISTORY OF HEART VALVE REPLACEMENT WITH TISSUE GRAFT: ICD-10-CM

## 2021-03-25 DIAGNOSIS — E87.1 HYPONATREMIA WITH EXCESS EXTRACELLULAR FLUID VOLUME: ICD-10-CM

## 2021-03-25 DIAGNOSIS — I50.32 CHRONIC DIASTOLIC CONGESTIVE HEART FAILURE, NYHA CLASS 2 (HCC): ICD-10-CM

## 2021-03-25 DIAGNOSIS — Z95.2 HISTORY OF AORTIC VALVE REPLACEMENT: Primary | ICD-10-CM

## 2021-03-25 DIAGNOSIS — E11.42 DIABETIC POLYNEUROPATHY ASSOCIATED WITH TYPE 2 DIABETES MELLITUS (HCC): ICD-10-CM

## 2021-03-25 PROCEDURE — 99214 OFFICE O/P EST MOD 30 MIN: CPT | Performed by: INTERNAL MEDICINE

## 2021-03-25 NOTE — PROGRESS NOTES
Assessment/Plan:   this 52-year-old male has a history of an aortic valve replacement in 2015 with a bioprosthetic aortic valve through minimal incision in the right chest   He last had a echo 1 year ago is due for another  He continues his 81 mg aspirin daily  Diastolic congestive heart failure is now stable on current medications  Hyponatremia is well controlled with fluid restriction is now normal     Patient had diabetic foot exam a day which did not duplicate any findings of peripheral neuropathy although the patient has symptoms consistent with this verses lumbosacral radiculopathy  This can be observed  Hemoglobin A1c is well controlled on minimal treatment with metformin 500 daily    Diet reviewed  Lifestyle modifications reviewed  Medications reviewed and ordered  Laboratory tests and studies reviewed and ordered  All patient's questions answered to patient satisfaction  Chief Complaint   Patient presents with    review blood work    Diabetes         Diagnoses and all orders for this visit:    History of aortic valve replacement    History of heart valve replacement with tissue graft  -     Echo complete with contrast if indicated; Future    Hypertensive heart disease with congestive heart failure, unspecified heart failure type (HCC)    Chronic diastolic congestive heart failure, NYHA class 2 ([facility] )  -     Echo complete with contrast if indicated; Future  -     CBC and differential; Future  -     Comprehensive metabolic panel; Future  -     Hemoglobin A1C; Future    Hyponatremia with excess extracellular fluid volume  -     Echo complete with contrast if indicated; Future  -     CBC and differential; Future  -     Comprehensive metabolic panel; Future  -     Hemoglobin A1C; Future    Diabetic polyneuropathy associated with type 2 diabetes mellitus ([address] )  -     Echo complete with contrast if indicated; Future  -     CBC and differential; Future  -     Comprehensive metabolic panel;  Future  - Hemoglobin A1C; Future        Subjective:     this 81-year-old male has a history of an aortic valve replacement in 2015 with a bioprosthetic aortic valve through minimal incision in the right chest   He last had a echo 1 year ago is due for another  He continues his 81 mg aspirin daily  Diastolic congestive heart failure is now stable on current medications  Hyponatremia is well controlled with fluid restriction is now normal     Patient had diabetic foot exam a day which did not duplicate any findings of peripheral neuropathy although the patient has symptoms consistent with this verses lumbosacral radiculopathy  This can be observed  Hemoglobin A1c is well controlled on minimal treatment with metformin 500 daily       Patient ID: Veronika George is a 81 y o  male          Current Outpatient Medications:     allopurinol (ZYLOPRIM) 100 mg tablet, Take 1 tablet (100 mg total) by mouth daily, Disp: 90 tablet, Rfl: 0    amLODIPine (NORVASC) 2 5 mg tablet, Take 1 tablet (2 5 mg total) by mouth daily, Disp: 90 tablet, Rfl: 0    atorvastatin (LIPITOR) 20 mg tablet, Take 1 tablet (20 mg total) by mouth daily, Disp: 90 tablet, Rfl: 1    BABY ASPIRIN PO, Take 81 mg by mouth daily, Disp: , Rfl:     Blood Glucose Monitoring Suppl (ONE TOUCH ULTRA 2) w/Device KIT, by Does not apply route daily To test blood sugar 1x daily DX:diabetes, Disp: 1 each, Rfl: 0    bumetanide (BUMEX) 1 mg tablet, Take 1 tablet (1 mg total) by mouth daily, Disp: 90 tablet, Rfl: 2    doxazosin (CARDURA) 4 mg tablet, Take 1 tablet (4 mg total) by mouth daily at bedtime, Disp: 90 tablet, Rfl: 0    fluticasone (FLONASE) 50 mcg/act nasal spray, 1 spray into each nostril 2 (two) times a day, Disp: 1 Bottle, Rfl: 11    glucose blood (ONE TOUCH ULTRA TEST) test strip, Test blood sugar once daily, Disp: 100 each, Rfl: 6    ipratropium (ATROVENT) 0 03 % nasal spray, 1 spray into each nostril 3 (three) times a day, Disp: 30 mL, Rfl: 11   Lancets (OneTouch Delica Plus [id number]) MISC, USE TO TEST BLOOD SUGARS ONCE DAILY, Disp: 100 each, Rfl: 6    metFORMIN (GLUCOPHAGE-XR) 500 mg 24 hr tablet, TAKE 1 TABLET BY MOUTH EVERY DAY WITH DINNER, Disp: 90 tablet, Rfl: 3    mometasone (ELOCON) 0 1 % lotion, One drop affected ear canal daily at bedtime when necessary itching, Disp: 60 mL, Rfl: 0    nitroglycerin (NITROSTAT) 0 4 mg SL tablet, Place 1 tablet (0 4 mg total) under the tongue every 5 (five) minutes as needed for chest pain, Disp: 90 tablet, Rfl: 1    pantoprazole (PROTONIX) 40 mg tablet, Take 1 tablet (40 mg total) by mouth daily at bedtime, Disp: 90 tablet, Rfl: 1    telmisartan (MICARDIS) 40 mg tablet, Take 1 tablet (40 mg total) by mouth daily, Disp: 90 tablet, Rfl: 3    Zoster Vac Recomb Adjuvanted (Shingrix) 50 MCG/0 5ML SUSR, Shingrix (PF) 50 mcg/0 5 mL intramuscular suspension, kit, Disp: , Rfl:     The following portions of the patient's history were reviewed and updated as appropriate: allergies, current medications, past family history, past medical history, past social history, past surgical history and problem list     Review of Systems   Constitutional: Negative for appetite change, fatigue, fever and unexpected weight change  HENT: Negative for rhinorrhea, sinus pressure, sinus pain, sneezing and sore throat  Eyes: Negative for visual disturbance  Respiratory: Negative for cough, chest tightness, shortness of breath and wheezing  Cardiovascular: Negative for chest pain, palpitations and leg swelling  Gastrointestinal: Negative for abdominal distention, abdominal pain, blood in stool, constipation, diarrhea, nausea and vomiting  Endocrine: Negative for polydipsia and polyuria  Genitourinary: Negative for decreased urine volume, difficulty urinating, dysuria, hematuria and urgency  Musculoskeletal: Negative for arthralgias, back pain, joint swelling and neck pain  Skin: Negative for rash     Allergic/Immunologic: Negative for environmental allergies  Neurological: Negative for tremors, weakness, light-headedness, numbness and headaches  Hematological: Does not bruise/bleed easily  Psychiatric/Behavioral: Negative for agitation, behavioral problems, confusion and dysphoric mood  The patient is not nervous/anxious            Family History   Problem Relation Age of Onset    Diabetes Mother     Alcohol abuse Brother     Bipolar disorder Brother     Hypertension Brother     Kidney disease Brother     Heart disease Family     Diabetes type II Family        Past Medical History:   Diagnosis Date    Aortic valve regurgitation 01/26/2017    Arthritis     [facility] ([facility] )     Coronary artery disease     [facility]    Edema 12/02/2016    GERD (gastroesophageal reflux disease)     Heart disease     Heart valve disorder     Heart valve replaced     History of basal cell cancer     HL (hearing loss)     Wears hearing aids    Hx of tissue graft 03/29/2017    Hyperlipemia 01/26/2017    Hypertension     Non-rheumatic mitral regurgitation 10/21/2016    Right bundle branch block (RBBB), 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       Past Surgical History:   Procedure Laterality Date    ADENOIDECTOMY      APPENDECTOMY      CARDIAC SURGERY      CARDIAC VALVE REPLACEMENT      EYE SURGERY      HERNIA REPAIR      TONSILLECTOMY      VASECTOMY         Social History     Socioeconomic History    Marital status: /Civil Union     Spouse name: None    Number of children: 3    Years of education: None    Highest education level: None   Occupational History    Occupation:  retired [employer]   Social Needs    Financial resource strain: None    Food insecurity     Worry: None     Inability: None    Transportation needs     Medical: None     Non-medical: None   Tobacco Use    Smoking status: Former Smoker     Types: Cigarettes, Cigars    Smokeless tobacco: Former User    Tobacco comment: quit 10 years ago, NEVER A SMOKER AS PER N[employer]GEN   Substance and Sexual Activity    Alcohol use: No    Drug use: No    Sexual activity: Yes     Partners: Female   Lifestyle    Physical activity     Days per week: None     Minutes per session: None    Stress: None   Relationships    Social connections     Talks on phone: None     Gets together: None     Attends Zoroastrian service: None     Active member of club or organization: None     Attends meetings of clubs or organizations: None     Relationship status: None    Intimate partner violence     Fear of current or ex partner: None     Emotionally abused: None     Physically abused: None     Forced sexual activity: None   Other Topics Concern    None   Social History Narrative    PT states he does not drink any caffeine        Allergies   Allergen Reactions    Orphenadrine GI Intolerance       BMI Counseling: Body mass index is 34 17 kg/m²  Follow-up plan was not completed due to elderly patient (73 years old) where weight reduction/weight gain would complicate underlying health condition such as: mental illness, dementia, or confusion and nutritional deficiency  Objective:    /60 (BP Location: Right arm, Patient Position: Sitting, Cuff Size: Large)   Temp 98 2 °F (36 8 °C) (Tympanic)   Ht 5' 9" (1 753 m)   Wt 105 kg (231 lb 6 4 oz)   BMI 34 17 kg/m²        Physical Exam  Constitutional:       General: He is not in acute distress  Appearance: He is well-developed  HENT:      Head: Normocephalic and atraumatic  Nose: Nose normal       Mouth/Throat:      Pharynx: No oropharyngeal exudate  Eyes:      General: No scleral icterus  Conjunctiva/sclera: Conjunctivae normal       Pupils: Pupils are equal, round, and reactive to light  Neck:      Musculoskeletal: Normal range of motion and neck supple  Thyroid: No thyromegaly  Vascular: No JVD  Trachea: No tracheal deviation  Cardiovascular:      Rate and Rhythm: Normal rate and regular rhythm  Heart sounds: Murmur present  No friction rub  No gallop  Pulmonary:      Effort: Pulmonary effort is normal  No respiratory distress  Breath sounds: No wheezing or rales  Chest:      Chest wall: No tenderness  Abdominal:      General: Bowel sounds are normal  There is no distension  Palpations: Abdomen is soft  There is no mass  Tenderness: There is no abdominal tenderness  There is no guarding or rebound  Musculoskeletal: Normal range of motion  General: No deformity  Lymphadenopathy:      Cervical: No cervical adenopathy  Skin:     General: Skin is warm and dry  Findings: No rash  Neurological:      Mental Status: He is alert and oriented to person, place, and time  Cranial Nerves: No cranial nerve deficit  Coordination: Coordination normal    Psychiatric:         Behavior: Behavior normal          Thought Content:  Thought content normal          Judgment: Judgment normal

## 2021-04-08 DIAGNOSIS — I38 CHRONIC DIASTOLIC HEART FAILURE DUE TO VALVULAR DISEASE (HCC): ICD-10-CM

## 2021-04-08 DIAGNOSIS — I11.0 HYPERTENSIVE HEART DISEASE WITH CONGESTIVE HEART FAILURE, UNSPECIFIED HEART FAILURE TYPE (HCC): ICD-10-CM

## 2021-04-08 DIAGNOSIS — I50.32 CHRONIC DIASTOLIC HEART FAILURE DUE TO VALVULAR DISEASE (HCC): ICD-10-CM

## 2021-04-08 DIAGNOSIS — I10 HYPERTENSION, UNSPECIFIED TYPE: ICD-10-CM

## 2021-04-08 DIAGNOSIS — E11.9 TYPE 2 DIABETES MELLITUS WITHOUT COMPLICATION, WITHOUT LONG-TERM CURRENT USE OF INSULIN (HCC): ICD-10-CM

## 2021-04-08 DIAGNOSIS — E78.5 HYPERLIPIDEMIA, UNSPECIFIED HYPERLIPIDEMIA TYPE: ICD-10-CM

## 2021-04-08 RX ORDER — AMLODIPINE BESYLATE 2.5 MG/1
TABLET ORAL
Qty: 90 TABLET | Refills: 3 | Status: SHIPPED | OUTPATIENT
Start: 2021-04-08 | End: 2021-06-21 | Stop reason: SDUPTHER

## 2021-04-29 ENCOUNTER — HOSPITAL ENCOUNTER (OUTPATIENT)
Dept: NON INVASIVE DIAGNOSTICS | Facility: HOSPITAL | Age: 86
Discharge: HOME | End: 2021-04-29
Payer: MEDICARE

## 2021-04-29 DIAGNOSIS — I50.32 CHRONIC DIASTOLIC CONGESTIVE HEART FAILURE, NYHA CLASS 2 (HCC): ICD-10-CM

## 2021-04-29 DIAGNOSIS — E87.1 HYPONATREMIA WITH EXCESS EXTRACELLULAR FLUID VOLUME: ICD-10-CM

## 2021-04-29 DIAGNOSIS — Z95.4 HISTORY OF HEART VALVE REPLACEMENT WITH TISSUE GRAFT: ICD-10-CM

## 2021-04-29 DIAGNOSIS — E11.42 DIABETIC POLYNEUROPATHY ASSOCIATED WITH TYPE 2 DIABETES MELLITUS (HCC): ICD-10-CM

## 2021-04-29 PROCEDURE — 93306 TTE W/DOPPLER COMPLETE: CPT | Performed by: INTERNAL MEDICINE

## 2021-04-29 PROCEDURE — 93306 TTE W/DOPPLER COMPLETE: CPT

## 2021-05-17 DIAGNOSIS — I10 HYPERTENSION, UNSPECIFIED TYPE: ICD-10-CM

## 2021-05-17 DIAGNOSIS — I38 CHRONIC DIASTOLIC HEART FAILURE DUE TO VALVULAR DISEASE (HCC): ICD-10-CM

## 2021-05-17 DIAGNOSIS — K21.00 GERD WITH ESOPHAGITIS: ICD-10-CM

## 2021-05-17 DIAGNOSIS — I50.32 CHRONIC DIASTOLIC HEART FAILURE DUE TO VALVULAR DISEASE (HCC): ICD-10-CM

## 2021-05-17 RX ORDER — DOXAZOSIN MESYLATE 4 MG/1
4 TABLET ORAL
Qty: 90 TABLET | Refills: 0 | Status: SHIPPED | OUTPATIENT
Start: 2021-05-17 | End: 2021-06-21 | Stop reason: SDUPTHER

## 2021-06-21 DIAGNOSIS — K21.00 GERD WITH ESOPHAGITIS: ICD-10-CM

## 2021-06-21 DIAGNOSIS — I11.0 HYPERTENSIVE HEART DISEASE WITH CONGESTIVE HEART FAILURE, UNSPECIFIED HEART FAILURE TYPE (HCC): ICD-10-CM

## 2021-06-21 DIAGNOSIS — E11.9 TYPE 2 DIABETES MELLITUS WITHOUT COMPLICATION, WITHOUT LONG-TERM CURRENT USE OF INSULIN (HCC): ICD-10-CM

## 2021-06-21 DIAGNOSIS — E78.5 HYPERLIPIDEMIA, UNSPECIFIED HYPERLIPIDEMIA TYPE: ICD-10-CM

## 2021-06-21 DIAGNOSIS — I50.32 CHRONIC DIASTOLIC HEART FAILURE DUE TO VALVULAR DISEASE (HCC): ICD-10-CM

## 2021-06-21 DIAGNOSIS — I38 CHRONIC DIASTOLIC HEART FAILURE DUE TO VALVULAR DISEASE (HCC): ICD-10-CM

## 2021-06-21 DIAGNOSIS — I10 HYPERTENSION, UNSPECIFIED TYPE: ICD-10-CM

## 2021-06-21 RX ORDER — ALLOPURINOL 100 MG/1
100 TABLET ORAL DAILY
Qty: 90 TABLET | Refills: 0 | Status: SHIPPED | OUTPATIENT
Start: 2021-06-21 | End: 2021-10-06

## 2021-06-21 RX ORDER — DOXAZOSIN MESYLATE 4 MG/1
4 TABLET ORAL
Qty: 90 TABLET | Refills: 0 | Status: SHIPPED | OUTPATIENT
Start: 2021-06-21 | End: 2021-11-15 | Stop reason: SDUPTHER

## 2021-06-21 RX ORDER — AMLODIPINE BESYLATE 2.5 MG/1
2.5 TABLET ORAL DAILY
Qty: 90 TABLET | Refills: 3 | Status: SHIPPED | OUTPATIENT
Start: 2021-06-21 | End: 2022-07-18 | Stop reason: SDUPTHER

## 2021-06-21 RX ORDER — BLOOD SUGAR DIAGNOSTIC
STRIP MISCELLANEOUS
Qty: 100 STRIP | Refills: 6 | Status: SHIPPED | OUTPATIENT
Start: 2021-06-21

## 2021-07-13 ENCOUNTER — TELEPHONE (OUTPATIENT)
Dept: FAMILY MEDICINE CLINIC | Facility: CLINIC | Age: 86
End: 2021-07-13

## 2021-07-14 ENCOUNTER — TELEPHONE (OUTPATIENT)
Dept: FAMILY MEDICINE CLINIC | Facility: CLINIC | Age: 86
End: 2021-07-14

## 2021-07-14 NOTE — TELEPHONE ENCOUNTER
Patient's wife Patrizia Mejia called and wanted to let Dr María Hoffman know that she has been monitoring her 's Blood pressure  The past few days it has been running around 93/43, 93/47 in the morning and then 117/52, 126/62 with his pulse running around 70-80  She would like to know if she should continue his Amlodipine 2 5 mg when the BP is still under 120  Please advise

## 2021-07-15 NOTE — TELEPHONE ENCOUNTER
Patient's wife called back and she is aware of not giving the amlodipine 2 5 mg if his B/p is <120 sys  Patient's wife has been doing this

## 2021-07-16 ENCOUNTER — APPOINTMENT (OUTPATIENT)
Dept: LAB | Age: 86
End: 2021-07-16
Payer: MEDICARE

## 2021-07-16 DIAGNOSIS — E11.42 DIABETIC POLYNEUROPATHY ASSOCIATED WITH TYPE 2 DIABETES MELLITUS (HCC): ICD-10-CM

## 2021-07-16 DIAGNOSIS — I50.32 CHRONIC DIASTOLIC CONGESTIVE HEART FAILURE, NYHA CLASS 2 (HCC): ICD-10-CM

## 2021-07-16 DIAGNOSIS — E87.1 HYPONATREMIA WITH EXCESS EXTRACELLULAR FLUID VOLUME: ICD-10-CM

## 2021-07-16 LAB
ALBUMIN SERPL BCP-MCNC: 3.2 G/DL (ref 3.5–5)
ALP SERPL-CCNC: 82 U/L (ref 46–116)
ALT SERPL W P-5'-P-CCNC: 64 U/L (ref 12–78)
ANION GAP SERPL CALCULATED.3IONS-SCNC: 4 MMOL/L (ref 4–13)
AST SERPL W P-5'-P-CCNC: 49 U/L (ref 5–45)
BASOPHILS # BLD AUTO: 0.05 THOUSANDS/ΜL (ref 0–0.1)
BASOPHILS NFR BLD AUTO: 1 % (ref 0–1)
BILIRUB SERPL-MCNC: 0.72 MG/DL (ref 0.2–1)
BUN SERPL-MCNC: 25 MG/DL (ref 5–25)
CALCIUM ALBUM COR SERPL-MCNC: 9.6 MG/DL (ref 8.3–10.1)
CALCIUM SERPL-MCNC: 9 MG/DL (ref 8.3–10.1)
CHLORIDE SERPL-SCNC: 102 MMOL/L (ref 100–108)
CO2 SERPL-SCNC: 30 MMOL/L (ref 21–32)
CREAT SERPL-MCNC: 1.11 MG/DL (ref 0.6–1.3)
EOSINOPHIL # BLD AUTO: 0.35 THOUSAND/ΜL (ref 0–0.61)
EOSINOPHIL NFR BLD AUTO: 4 % (ref 0–6)
ERYTHROCYTE [DISTWIDTH] IN BLOOD BY AUTOMATED COUNT: 13.2 % (ref 11.6–15.1)
EST. AVERAGE GLUCOSE BLD GHB EST-MCNC: 140 MG/DL
GFR SERPL CREATININE-BSD FRML MDRD: 61 ML/MIN/1.73SQ M
GLUCOSE P FAST SERPL-MCNC: 137 MG/DL (ref 65–99)
HBA1C MFR BLD: 6.5 %
HCT VFR BLD AUTO: 39 % (ref 36.5–49.3)
HGB BLD-MCNC: 12.9 G/DL (ref 12–17)
IMM GRANULOCYTES # BLD AUTO: 0.06 THOUSAND/UL (ref 0–0.2)
IMM GRANULOCYTES NFR BLD AUTO: 1 % (ref 0–2)
LYMPHOCYTES # BLD AUTO: 2.12 THOUSANDS/ΜL (ref 0.6–4.47)
LYMPHOCYTES NFR BLD AUTO: 21 % (ref 14–44)
MCH RBC QN AUTO: 33.6 PG (ref 26.8–34.3)
MCHC RBC AUTO-ENTMCNC: 33.1 G/DL (ref 31.4–37.4)
MCV RBC AUTO: 102 FL (ref 82–98)
MONOCYTES # BLD AUTO: 0.8 THOUSAND/ΜL (ref 0.17–1.22)
MONOCYTES NFR BLD AUTO: 8 % (ref 4–12)
NEUTROPHILS # BLD AUTO: 6.62 THOUSANDS/ΜL (ref 1.85–7.62)
NEUTS SEG NFR BLD AUTO: 65 % (ref 43–75)
NRBC BLD AUTO-RTO: 0 /100 WBCS
PLATELET # BLD AUTO: 192 THOUSANDS/UL (ref 149–390)
PMV BLD AUTO: 10.4 FL (ref 8.9–12.7)
POTASSIUM SERPL-SCNC: 4.2 MMOL/L (ref 3.5–5.3)
PROT SERPL-MCNC: 7 G/DL (ref 6.4–8.2)
RBC # BLD AUTO: 3.84 MILLION/UL (ref 3.88–5.62)
SODIUM SERPL-SCNC: 136 MMOL/L (ref 136–145)
WBC # BLD AUTO: 10 THOUSAND/UL (ref 4.31–10.16)

## 2021-07-16 PROCEDURE — 83036 HEMOGLOBIN GLYCOSYLATED A1C: CPT

## 2021-07-16 PROCEDURE — 36415 COLL VENOUS BLD VENIPUNCTURE: CPT

## 2021-07-16 PROCEDURE — 85025 COMPLETE CBC W/AUTO DIFF WBC: CPT

## 2021-07-16 PROCEDURE — 80053 COMPREHEN METABOLIC PANEL: CPT

## 2021-07-28 RX ORDER — ASCORBIC ACID 500 MG
500 TABLET ORAL DAILY
COMMUNITY

## 2021-07-29 ENCOUNTER — TELEPHONE (OUTPATIENT)
Dept: FAMILY MEDICINE CLINIC | Facility: CLINIC | Age: 86
End: 2021-07-29

## 2021-07-29 ENCOUNTER — OFFICE VISIT (OUTPATIENT)
Dept: FAMILY MEDICINE CLINIC | Facility: CLINIC | Age: 86
End: 2021-07-29
Payer: MEDICARE

## 2021-07-29 VITALS
HEART RATE: 77 BPM | HEIGHT: 69 IN | RESPIRATION RATE: 18 BRPM | BODY MASS INDEX: 32.53 KG/M2 | OXYGEN SATURATION: 97 % | WEIGHT: 219.6 LBS | SYSTOLIC BLOOD PRESSURE: 120 MMHG | DIASTOLIC BLOOD PRESSURE: 60 MMHG | TEMPERATURE: 97.1 F

## 2021-07-29 DIAGNOSIS — E66.01 SEVERE OBESITY (BMI 35.0-35.9 WITH COMORBIDITY) (HCC): ICD-10-CM

## 2021-07-29 DIAGNOSIS — I45.2 RIGHT BUNDLE BRANCH BLOCK WITH LEFT ANTERIOR FASCICULAR BLOCK: ICD-10-CM

## 2021-07-29 DIAGNOSIS — E11.42 DIABETIC POLYNEUROPATHY ASSOCIATED WITH TYPE 2 DIABETES MELLITUS (HCC): Primary | ICD-10-CM

## 2021-07-29 DIAGNOSIS — Z95.0 PACEMAKER: ICD-10-CM

## 2021-07-29 DIAGNOSIS — I73.9 PERIPHERAL ARTERY INSUFFICIENCY (HCC): ICD-10-CM

## 2021-07-29 PROBLEM — R00.1 BRADYCARDIA: Status: ACTIVE | Noted: 2021-06-03

## 2021-07-29 PROBLEM — R26.2 AMBULATORY DYSFUNCTION: Status: ACTIVE | Noted: 2021-07-04

## 2021-07-29 PROCEDURE — 93000 ELECTROCARDIOGRAM COMPLETE: CPT | Performed by: INTERNAL MEDICINE

## 2021-07-29 PROCEDURE — 99214 OFFICE O/P EST MOD 30 MIN: CPT | Performed by: INTERNAL MEDICINE

## 2021-07-30 NOTE — PROGRESS NOTES
Assessment/Plan: This 81-year-old male had a pacemaker inserted for syncope approximately a month ago more recently he was just discharged following get relatively short hospital stay for the get up out of a chair without is this greatly improved  States that after his pacemaker placement he has some soreness in his neck which has now improved  At this point he is unable to get an MRI because of these recently insert a pacemaker  Plain x-rays and CT scans remain an option in the future should his symptoms persist   However the patient states that he feels much improved and is more alert in addition  He has had no falls or head injuries  His blood pressure does not fall more than 10 points with standing  Diet reviewed  Lifestyle modifications reviewed  Medications reviewed and ordered  Laboratory tests and studies reviewed and ordered  All patient's questions answered to patient satisfaction  Chief Complaint   Patient presents with    Follow-up     4 month          Diagnoses and all orders for this visit:    Diabetic polyneuropathy associated with type 2 diabetes mellitus ([facility] [address] )  -     CBC and differential; Future  -     Comprehensive metabolic panel; Future  -     Hemoglobin A1C; Future    Peripheral artery insufficiency (HCC)    Severe obesity (BMI 35 0-35 9 with comorbidity) (HCC)    Right bundle branch block with left anterior fascicular block    Pacemaker  -     POCT ECG    Other orders  -     ascorbic acid (VITAMIN C) 500 MG tablet; Take 500 mg by mouth daily        Subjective: This 81-year-old male had a pacemaker inserted for syncope approximately a month ago more recently he was just discharged following get relatively short hospital stay for the get up out of a chair without is this greatly improved  States that after his pacemaker placement he has some soreness in his neck which has now improved  At this point he is unable to get an MRI because of these recently insert a pacemaker  Plain x-rays and CT scans remain an option in the future should his symptoms persist   However the patient states that he feels much improved and is more alert in addition  He has had no falls or head injuries  His blood pressure does not fall more than 10 points with standing  Patient ID: Olive Prieto is a 81 y o  male          Current Outpatient Medications:     allopurinol (ZYLOPRIM) 100 mg tablet, Take 1 tablet (100 mg total) by mouth daily, Disp: 90 tablet, Rfl: 0    amLODIPine (NORVASC) 2 5 mg tablet, Take 1 tablet (2 5 mg total) by mouth daily, Disp: 90 tablet, Rfl: 3    atorvastatin (LIPITOR) 20 mg tablet, Take 1 tablet (20 mg total) by mouth daily, Disp: 90 tablet, Rfl: 1    BABY ASPIRIN PO, Take 81 mg by mouth daily, Disp: , Rfl:     Blood Glucose Monitoring Suppl (ONE TOUCH ULTRA 2) w/Device KIT, by Does not apply route daily To test blood sugar 1x daily DX:diabetes, Disp: 1 each, Rfl: 0    doxazosin (CARDURA) 4 mg tablet, Take 1 tablet (4 mg total) by mouth daily at bedtime, Disp: 90 tablet, Rfl: 0    fluticasone (FLONASE) 50 mcg/act nasal spray, 1 spray into each nostril 2 (two) times a day, Disp: 1 Bottle, Rfl: 11    Lancets (OneTouch Delica Plus [id number]) MISC, USE TO TEST BLOOD SUGARS ONCE DAILY, Disp: 100 each, Rfl: 6    metFORMIN (GLUCOPHAGE-XR) 500 mg 24 hr tablet, TAKE 1 TABLET BY MOUTH EVERY DAY WITH DINNER, Disp: 90 tablet, Rfl: 3    mometasone (ELOCON) 0 1 % lotion, One drop affected ear canal daily at bedtime when necessary itching, Disp: 60 mL, Rfl: 0    nitroglycerin (NITROSTAT) 0 4 mg SL tablet, Place 1 tablet (0 4 mg total) under the tongue every 5 (five) minutes as needed for chest pain, Disp: 90 tablet, Rfl: 1    OneTouch Ultra test strip, USE TO TEST BLOOD SUGAR ONCE DAILY, Disp: 100 strip, Rfl: 6    pantoprazole (PROTONIX) 40 mg tablet, Take 1 tablet (40 mg total) by mouth daily at bedtime, Disp: 90 tablet, Rfl: 1    telmisartan (MICARDIS) 40 mg tablet, Take 1 tablet (40 mg total) by mouth daily, Disp: 90 tablet, Rfl: 3    Zoster Vac Recomb Adjuvanted (Shingrix) 50 MCG/0 5ML SUSR, Shingrix (PF) 50 mcg/0 5 mL intramuscular suspension, kit, Disp: , Rfl:     ascorbic acid (VITAMIN C) 500 MG tablet, Take 500 mg by mouth daily, Disp: , Rfl:     bumetanide (BUMEX) 1 mg tablet, Take 1 tablet (1 mg total) by mouth daily, Disp: 90 tablet, Rfl: 2    ipratropium (ATROVENT) 0 03 % nasal spray, 1 spray into each nostril 3 (three) times a day (Patient not taking: Reported on 7/29/2021), Disp: 30 mL, Rfl: 11    The following portions of the patient's history were reviewed and updated as appropriate: allergies, current medications, past family history, past medical history, past social history, past surgical history and problem list     Review of Systems   Constitutional: Negative for appetite change, fatigue, fever and unexpected weight change  HENT: Negative for rhinorrhea, sinus pressure, sinus pain, sneezing and sore throat  Eyes: Negative for visual disturbance  Respiratory: Negative for cough, chest tightness, shortness of breath and wheezing  Cardiovascular: Negative for chest pain, palpitations and leg swelling  Gastrointestinal: Negative for abdominal distention, abdominal pain, blood in stool, constipation, diarrhea, nausea and vomiting  Endocrine: Negative for polydipsia and polyuria  Genitourinary: Negative for decreased urine volume, difficulty urinating, dysuria, hematuria and urgency  Musculoskeletal: Negative for arthralgias, back pain, joint swelling and neck pain  Skin: Negative for rash  Allergic/Immunologic: Negative for environmental allergies  Neurological: Negative for tremors, weakness, light-headedness, numbness and headaches  Hematological: Does not bruise/bleed easily  Psychiatric/Behavioral: Negative for agitation, behavioral problems, confusion and dysphoric mood  The patient is not nervous/anxious            Family History Problem Relation Age of Onset    Diabetes Mother     Alcohol abuse Brother     Bipolar disorder Brother     Hypertension Brother     Kidney disease Brother     Heart disease Family     Diabetes type II Family        Past Medical History:   Diagnosis Date    Aortic valve regurgitation 01/26/2017    Arthritis     Cancer ([facility] )     Coronary artery disease     Coronary artery disease with angina pectoris ([facility] ) 3/19/2019    Edema 12/02/2016    GERD (gastroesophageal reflux disease)     Heart disease     Heart valve disorder     Heart valve replaced     History of basal cell cancer     HL (hearing loss)     Wears hearing aids    Hx of tissue graft 03/29/2017    Hyperlipemia 01/26/2017    Hypertension     Non-rheumatic mitral regurgitation 10/21/2016    Right bundle branch block (RBBB), 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       Past Surgical History:   Procedure Laterality Date    ADENOIDECTOMY      APPENDECTOMY      CARDIAC SURGERY      CARDIAC VALVE REPLACEMENT      EYE SURGERY      HERNIA REPAIR      TONSILLECTOMY      VASECTOMY         Social History     Socioeconomic History    Marital status: /Civil Union     Spouse name: None    Number of children: 3    Years of education: None    Highest education level: None   Occupational History    Occupation:  retired [employer]   Tobacco Use    Smoking status: Former Smoker     Packs/day: 0 50     Types: Cigarettes, Cigars    Smokeless tobacco: Former User    Tobacco comment: quit 10 years ago, NEVER A SMOKER AS PER NEXTGEN   Vaping Use    Vaping Use: Never used   Substance and Sexual Activity    Alcohol use: No    Drug use: No    Sexual activity: Yes     Partners: Female   Other Topics Concern    None   Social History Narrative    PT states he does not drink any caffeine      Social Determinants of Health     Financial Resource Strain:     Difficulty of Paying Living Expenses: Food Insecurity:     Worried About Running Out of Food in the Last Year:     [address] in the Last Year:    Transportation Needs:     Lack of Transportation (Medical):  Lack of Transportation (Non-Medical):    Physical Activity:     Days of Exercise per Week:     Minutes of Exercise per Session:    Stress:     Feeling of Stress :    Social Connections:     Frequency of Communication with Friends and Family:     Frequency of Social Gatherings with Friends and Family:     Attends Gnosticism Services:     Active Member of Clubs or Organizations:     Attends Club or Organization Meetings:     Marital Status:    Intimate Partner Violence:     Fear of Current or Ex-Partner:     Emotionally Abused:     Physically Abused:     Sexually Abused: Allergies   Allergen Reactions    Orphenadrine GI Intolerance            Objective:    /60 (BP Location: Right arm, Patient Position: Sitting, Cuff Size: Standard)   Pulse 77   Temp (!) 97 1 °F (36 2 °C)   Resp 18   Ht 5' 9" (1 753 m)   Wt 99 6 kg (219 lb 9 6 oz)   SpO2 97%   BMI 32 43 kg/m²        Physical Exam  Constitutional:       General: He is not in acute distress  Appearance: He is well-developed  HENT:      Head: Normocephalic and atraumatic  Nose: Nose normal       Mouth/Throat:      Pharynx: No oropharyngeal exudate  Eyes:      General: No scleral icterus  Conjunctiva/sclera: Conjunctivae normal       Pupils: Pupils are equal, round, and reactive to light  Neck:      Thyroid: No thyromegaly  Vascular: No JVD  Trachea: No tracheal deviation  Cardiovascular:      Rate and Rhythm: Normal rate and regular rhythm  Heart sounds: Normal heart sounds  No murmur heard  No friction rub  No gallop  Pulmonary:      Effort: Pulmonary effort is normal  No respiratory distress  Breath sounds: No wheezing or rales  Chest:      Chest wall: No tenderness     Abdominal:      General: Bowel sounds are normal  There is no distension  Palpations: Abdomen is soft  There is no mass  Tenderness: There is no abdominal tenderness  There is no guarding or rebound  Musculoskeletal:         General: No deformity  Normal range of motion  Cervical back: Normal range of motion and neck supple  Lymphadenopathy:      Cervical: No cervical adenopathy  Skin:     General: Skin is warm and dry  Findings: No rash  Neurological:      Mental Status: He is alert and oriented to person, place, and time  Cranial Nerves: No cranial nerve deficit  Coordination: Coordination normal    Psychiatric:         Behavior: Behavior normal          Thought Content:  Thought content normal          Judgment: Judgment normal

## 2021-09-13 DIAGNOSIS — K02.9 DENTAL CARIES: Primary | ICD-10-CM

## 2021-09-13 RX ORDER — AMOXICILLIN 500 MG/1
500 CAPSULE ORAL EVERY 8 HOURS SCHEDULED
Qty: 16 CAPSULE | Refills: 0 | Status: SHIPPED | OUTPATIENT
Start: 2021-09-13 | End: 2021-10-13

## 2021-09-16 ENCOUNTER — OFFICE VISIT (OUTPATIENT)
Dept: FAMILY MEDICINE CLINIC | Facility: CLINIC | Age: 86
End: 2021-09-16
Payer: MEDICARE

## 2021-09-16 VITALS
HEART RATE: 81 BPM | BODY MASS INDEX: 32.14 KG/M2 | DIASTOLIC BLOOD PRESSURE: 52 MMHG | TEMPERATURE: 98.2 F | OXYGEN SATURATION: 97 % | WEIGHT: 217 LBS | HEIGHT: 69 IN | SYSTOLIC BLOOD PRESSURE: 118 MMHG

## 2021-09-16 DIAGNOSIS — B35.6 TINEA CRURIS: Primary | ICD-10-CM

## 2021-09-16 DIAGNOSIS — I70.25 ATHEROSCLEROSIS OF NATIVE ARTERIES OF THE EXTREMITIES WITH ULCERATION (HCC): ICD-10-CM

## 2021-09-16 PROCEDURE — 99213 OFFICE O/P EST LOW 20 MIN: CPT | Performed by: FAMILY MEDICINE

## 2021-09-16 RX ORDER — NYSTATIN 100000 U/G
CREAM TOPICAL 2 TIMES DAILY
Qty: 30 G | Refills: 1 | Status: SHIPPED | OUTPATIENT
Start: 2021-09-16 | End: 2021-09-30 | Stop reason: DRUGHIGH

## 2021-09-16 RX ORDER — NYSTATIN 100000 [USP'U]/G
POWDER TOPICAL 3 TIMES DAILY
Qty: 60 G | Refills: 1 | Status: SHIPPED | OUTPATIENT
Start: 2021-09-16

## 2021-09-20 NOTE — PROGRESS NOTES
Assessment/Plan:    60-year-old male with:  Tinea cruris and atherosclerosis discussed supportive care return parameters at nystatin powder and cream advised to call back if not improving worsening continue medications as    No problem-specific Assessment & Plan notes found for this encounter  Diagnoses and all orders for this visit:    Tinea cruris  -     nystatin (MYCOSTATIN) cream; Apply topically 2 (two) times a day  -     nystatin (MYCOSTATIN) powder; Apply topically 3 (three) times a day    Atherosclerosis 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 ([facility] [address] )    Other orders  -     glucose blood test strip; OneTouch Ultra Test strips   USE TO TEST BLOOD SUGAR ONCE DAILY          Subjective:     Chief Complaint   Patient presents with    Rash     PAtient is here due to rash or something in genital area  Patient ID: Manny Mai is a 81 y o  male  Patient is an 60-year-old male who presents follow-up on rash in his groin and has atherosclerosis and lower extremities no fevers chills nausea vomiting      The following portions of the patient's history were reviewed and updated as appropriate: allergies, current medications, past family history, past medical history, past social history, past surgical history and problem list     Review of Systems   Constitutional: Negative  HENT: Negative  Eyes: Negative  Respiratory: Negative  Cardiovascular: Negative  Gastrointestinal: Negative  Endocrine: Negative  Genitourinary: Negative  Musculoskeletal: Negative  Skin: Positive for rash  Allergic/Immunologic: Negative  Neurological: Negative  Hematological: Negative  Psychiatric/Behavioral: Negative  All other systems reviewed and are negative          Objective:      /52 (BP Location: Right arm, Patient Position: Sitting, Cuff Size: Standard)   Pulse 81   Temp 98 2 °F (36 8 °C) (Tympanic)   Ht 5' 9" (1 753 m)   Wt 98 4 kg (217 lb)   SpO2 97% BMI 32 05 kg/m²          Physical Exam  Constitutional:       Appearance: He is well-developed  HENT:      Head: Atraumatic  Right Ear: External ear normal       Left Ear: External ear normal    Eyes:      Conjunctiva/sclera: Conjunctivae normal       Pupils: Pupils are equal, round, and reactive to light  Pulmonary:      Effort: Pulmonary effort is normal  No respiratory distress  Abdominal:      General: There is no distension  Musculoskeletal:         General: Normal range of motion  Cervical back: Normal range of motion  Skin:     General: Skin is warm and dry  Findings: Rash present  Comments: Red rash in growing   Neurological:      Mental Status: He is alert and oriented to person, place, and time  Cranial Nerves: No cranial nerve deficit  Psychiatric:         Behavior: Behavior normal          Thought Content: Thought content normal          Judgment: Judgment normal          BMI Counseling: Body mass index is 32 05 kg/m²  The BMI is above normal  Nutrition recommendations include encouraging healthy choices of fruits and vegetables  Exercise recommendations include moderate physical activity 150 minutes/week  Rationale for BMI follow-up plan is due to patient being overweight or obese  Depression Screening and Follow-up Plan:   Patient was screened for depression during today's encounter  They screened negative with a PHQ-2 score of 0

## 2021-09-30 ENCOUNTER — OFFICE VISIT (OUTPATIENT)
Dept: FAMILY MEDICINE CLINIC | Facility: CLINIC | Age: 86
End: 2021-09-30
Payer: MEDICARE

## 2021-09-30 VITALS
TEMPERATURE: 98.6 F | OXYGEN SATURATION: 97 % | RESPIRATION RATE: 18 BRPM | SYSTOLIC BLOOD PRESSURE: 120 MMHG | DIASTOLIC BLOOD PRESSURE: 60 MMHG | WEIGHT: 215.4 LBS | HEART RATE: 68 BPM | HEIGHT: 69 IN | BODY MASS INDEX: 31.9 KG/M2

## 2021-09-30 DIAGNOSIS — R53.1 WEAKNESS: ICD-10-CM

## 2021-09-30 DIAGNOSIS — R41.0 CONFUSION: ICD-10-CM

## 2021-09-30 DIAGNOSIS — T82.857D STENOSIS OF PROSTHETIC AORTIC VALVE, SUBSEQUENT ENCOUNTER: ICD-10-CM

## 2021-09-30 DIAGNOSIS — Z95.4 HISTORY OF HEART VALVE REPLACEMENT WITH TISSUE GRAFT: ICD-10-CM

## 2021-09-30 DIAGNOSIS — Z95.0 PACEMAKER: ICD-10-CM

## 2021-09-30 DIAGNOSIS — B35.6 TINEA CRURIS: Primary | ICD-10-CM

## 2021-09-30 PROCEDURE — 99215 OFFICE O/P EST HI 40 MIN: CPT | Performed by: INTERNAL MEDICINE

## 2021-09-30 PROCEDURE — 1124F ACP DISCUSS-NO DSCNMKR DOCD: CPT | Performed by: INTERNAL MEDICINE

## 2021-09-30 RX ORDER — NYSTATIN 100000 U/G
CREAM TOPICAL 2 TIMES DAILY
Qty: 150 G | Refills: 2 | Status: SHIPPED | OUTPATIENT
Start: 2021-09-30

## 2021-10-06 DIAGNOSIS — E78.5 HYPERLIPIDEMIA, UNSPECIFIED HYPERLIPIDEMIA TYPE: ICD-10-CM

## 2021-10-06 DIAGNOSIS — I50.32 CHRONIC DIASTOLIC HEART FAILURE DUE TO VALVULAR DISEASE (HCC): ICD-10-CM

## 2021-10-06 DIAGNOSIS — E11.9 TYPE 2 DIABETES MELLITUS WITHOUT COMPLICATION, WITHOUT LONG-TERM CURRENT USE OF INSULIN (HCC): ICD-10-CM

## 2021-10-06 DIAGNOSIS — I10 HYPERTENSION, UNSPECIFIED TYPE: ICD-10-CM

## 2021-10-06 DIAGNOSIS — I11.0 HYPERTENSIVE HEART DISEASE WITH CONGESTIVE HEART FAILURE, UNSPECIFIED HEART FAILURE TYPE (HCC): ICD-10-CM

## 2021-10-06 DIAGNOSIS — I38 CHRONIC DIASTOLIC HEART FAILURE DUE TO VALVULAR DISEASE (HCC): ICD-10-CM

## 2021-10-06 RX ORDER — ALLOPURINOL 100 MG/1
TABLET ORAL
Qty: 90 TABLET | Refills: 0 | Status: SHIPPED | OUTPATIENT
Start: 2021-10-06 | End: 2021-11-15 | Stop reason: SDUPTHER

## 2021-10-14 ENCOUNTER — OFFICE VISIT (OUTPATIENT)
Dept: FAMILY MEDICINE CLINIC | Facility: CLINIC | Age: 86
End: 2021-10-14
Payer: MEDICARE

## 2021-10-14 VITALS
OXYGEN SATURATION: 96 % | DIASTOLIC BLOOD PRESSURE: 60 MMHG | HEIGHT: 69 IN | RESPIRATION RATE: 20 BRPM | BODY MASS INDEX: 31.84 KG/M2 | HEART RATE: 65 BPM | SYSTOLIC BLOOD PRESSURE: 114 MMHG | WEIGHT: 215 LBS | TEMPERATURE: 98.5 F

## 2021-10-14 DIAGNOSIS — B35.6 TINEA CRURIS: Primary | ICD-10-CM

## 2021-10-14 PROBLEM — R53.1 WEAKNESS: Status: ACTIVE | Noted: 2021-07-04

## 2021-10-14 PROCEDURE — 99213 OFFICE O/P EST LOW 20 MIN: CPT | Performed by: INTERNAL MEDICINE

## 2021-10-14 RX ORDER — CLOTRIMAZOLE AND BETAMETHASONE DIPROPIONATE 10; .64 MG/G; MG/G
CREAM TOPICAL 2 TIMES DAILY
Qty: 45 G | Refills: 1 | Status: SHIPPED | OUTPATIENT
Start: 2021-10-14

## 2021-11-04 ENCOUNTER — APPOINTMENT (OUTPATIENT)
Dept: LAB | Age: 86
End: 2021-11-04
Payer: MEDICARE

## 2021-11-04 DIAGNOSIS — E11.42 DIABETIC POLYNEUROPATHY ASSOCIATED WITH TYPE 2 DIABETES MELLITUS (HCC): ICD-10-CM

## 2021-11-04 LAB
ALBUMIN SERPL BCP-MCNC: 3 G/DL (ref 3.5–5)
ALP SERPL-CCNC: 88 U/L (ref 46–116)
ALT SERPL W P-5'-P-CCNC: 54 U/L (ref 12–78)
ANION GAP SERPL CALCULATED.3IONS-SCNC: 4 MMOL/L (ref 4–13)
AST SERPL W P-5'-P-CCNC: 42 U/L (ref 5–45)
BASOPHILS # BLD AUTO: 0.04 THOUSANDS/ΜL (ref 0–0.1)
BASOPHILS NFR BLD AUTO: 1 % (ref 0–1)
BILIRUB SERPL-MCNC: 0.59 MG/DL (ref 0.2–1)
BUN SERPL-MCNC: 22 MG/DL (ref 5–25)
CALCIUM ALBUM COR SERPL-MCNC: 9.6 MG/DL (ref 8.3–10.1)
CALCIUM SERPL-MCNC: 8.8 MG/DL (ref 8.3–10.1)
CHLORIDE SERPL-SCNC: 101 MMOL/L (ref 100–108)
CO2 SERPL-SCNC: 29 MMOL/L (ref 21–32)
CREAT SERPL-MCNC: 1.1 MG/DL (ref 0.6–1.3)
EOSINOPHIL # BLD AUTO: 0.36 THOUSAND/ΜL (ref 0–0.61)
EOSINOPHIL NFR BLD AUTO: 4 % (ref 0–6)
ERYTHROCYTE [DISTWIDTH] IN BLOOD BY AUTOMATED COUNT: 13.2 % (ref 11.6–15.1)
EST. AVERAGE GLUCOSE BLD GHB EST-MCNC: 131 MG/DL
GFR SERPL CREATININE-BSD FRML MDRD: 61 ML/MIN/1.73SQ M
GLUCOSE P FAST SERPL-MCNC: 154 MG/DL (ref 65–99)
HBA1C MFR BLD: 6.2 %
HCT VFR BLD AUTO: 39.5 % (ref 36.5–49.3)
HGB BLD-MCNC: 13.2 G/DL (ref 12–17)
IMM GRANULOCYTES # BLD AUTO: 0.07 THOUSAND/UL (ref 0–0.2)
IMM GRANULOCYTES NFR BLD AUTO: 1 % (ref 0–2)
LYMPHOCYTES # BLD AUTO: 1.73 THOUSANDS/ΜL (ref 0.6–4.47)
LYMPHOCYTES NFR BLD AUTO: 20 % (ref 14–44)
MCH RBC QN AUTO: 33.9 PG (ref 26.8–34.3)
MCHC RBC AUTO-ENTMCNC: 33.4 G/DL (ref 31.4–37.4)
MCV RBC AUTO: 102 FL (ref 82–98)
MONOCYTES # BLD AUTO: 0.79 THOUSAND/ΜL (ref 0.17–1.22)
MONOCYTES NFR BLD AUTO: 9 % (ref 4–12)
NEUTROPHILS # BLD AUTO: 5.54 THOUSANDS/ΜL (ref 1.85–7.62)
NEUTS SEG NFR BLD AUTO: 65 % (ref 43–75)
NRBC BLD AUTO-RTO: 0 /100 WBCS
PLATELET # BLD AUTO: 173 THOUSANDS/UL (ref 149–390)
PMV BLD AUTO: 10.4 FL (ref 8.9–12.7)
POTASSIUM SERPL-SCNC: 4.5 MMOL/L (ref 3.5–5.3)
PROT SERPL-MCNC: 7.2 G/DL (ref 6.4–8.2)
RBC # BLD AUTO: 3.89 MILLION/UL (ref 3.88–5.62)
SODIUM SERPL-SCNC: 134 MMOL/L (ref 136–145)
WBC # BLD AUTO: 8.53 THOUSAND/UL (ref 4.31–10.16)

## 2021-11-04 PROCEDURE — 36415 COLL VENOUS BLD VENIPUNCTURE: CPT

## 2021-11-04 PROCEDURE — 83036 HEMOGLOBIN GLYCOSYLATED A1C: CPT

## 2021-11-04 PROCEDURE — 80053 COMPREHEN METABOLIC PANEL: CPT

## 2021-11-04 PROCEDURE — 85025 COMPLETE CBC W/AUTO DIFF WBC: CPT

## 2021-11-10 RX ORDER — TRIAMCINOLONE ACETONIDE 1 MG/G
CREAM TOPICAL
COMMUNITY
Start: 2021-10-29

## 2021-11-10 RX ORDER — KETOCONAZOLE 20 MG/G
CREAM TOPICAL
COMMUNITY
Start: 2021-10-29

## 2021-11-11 ENCOUNTER — TELEPHONE (OUTPATIENT)
Dept: FAMILY MEDICINE CLINIC | Facility: CLINIC | Age: 86
End: 2021-11-11

## 2021-11-11 ENCOUNTER — OFFICE VISIT (OUTPATIENT)
Dept: FAMILY MEDICINE CLINIC | Facility: CLINIC | Age: 86
End: 2021-11-11
Payer: MEDICARE

## 2021-11-11 VITALS
BODY MASS INDEX: 31.46 KG/M2 | WEIGHT: 212.4 LBS | TEMPERATURE: 98 F | HEIGHT: 69 IN | OXYGEN SATURATION: 87 % | HEART RATE: 72 BPM | SYSTOLIC BLOOD PRESSURE: 130 MMHG | DIASTOLIC BLOOD PRESSURE: 56 MMHG

## 2021-11-11 DIAGNOSIS — I50.32 CHRONIC DIASTOLIC CONGESTIVE HEART FAILURE, NYHA CLASS 2 (HCC): ICD-10-CM

## 2021-11-11 DIAGNOSIS — I11.0 HYPERTENSIVE HEART DISEASE WITH CONGESTIVE HEART FAILURE, UNSPECIFIED HEART FAILURE TYPE (HCC): ICD-10-CM

## 2021-11-11 DIAGNOSIS — Z95.2 HISTORY OF AORTIC VALVE REPLACEMENT: ICD-10-CM

## 2021-11-11 DIAGNOSIS — I25.10 MULTIPLE VESSEL CORONARY ARTERY DISEASE: ICD-10-CM

## 2021-11-11 DIAGNOSIS — Z95.4 HISTORY OF HEART VALVE REPLACEMENT WITH TISSUE GRAFT: ICD-10-CM

## 2021-11-11 DIAGNOSIS — T82.897D AORTIC PROSTHETIC VALVE REGURGITATION, SUBSEQUENT ENCOUNTER: ICD-10-CM

## 2021-11-11 DIAGNOSIS — E87.1 HYPONATREMIA WITH EXCESS EXTRACELLULAR FLUID VOLUME: ICD-10-CM

## 2021-11-11 DIAGNOSIS — Z00.00 MEDICARE ANNUAL WELLNESS VISIT, SUBSEQUENT: Primary | ICD-10-CM

## 2021-11-11 DIAGNOSIS — B35.6 TINEA CRURIS: ICD-10-CM

## 2021-11-11 DIAGNOSIS — Z95.0 PACEMAKER: ICD-10-CM

## 2021-11-11 DIAGNOSIS — I73.9 PERIPHERAL ARTERY INSUFFICIENCY (HCC): ICD-10-CM

## 2021-11-11 DIAGNOSIS — E78.00 PURE HYPERCHOLESTEROLEMIA: ICD-10-CM

## 2021-11-11 DIAGNOSIS — I45.2 RIGHT BUNDLE BRANCH BLOCK WITH LEFT ANTERIOR FASCICULAR BLOCK: ICD-10-CM

## 2021-11-11 DIAGNOSIS — R41.89 COGNITIVE DECLINE: ICD-10-CM

## 2021-11-11 PROBLEM — E66.01 OBESITY, MORBID (HCC): Status: RESOLVED | Noted: 2020-12-22 | Resolved: 2021-11-11

## 2021-11-11 PROCEDURE — 99214 OFFICE O/P EST MOD 30 MIN: CPT | Performed by: INTERNAL MEDICINE

## 2021-11-11 PROCEDURE — G0439 PPPS, SUBSEQ VISIT: HCPCS | Performed by: INTERNAL MEDICINE

## 2021-11-15 DIAGNOSIS — K21.00 GERD WITH ESOPHAGITIS: ICD-10-CM

## 2021-11-15 DIAGNOSIS — I11.0 HYPERTENSIVE HEART DISEASE WITH CONGESTIVE HEART FAILURE, UNSPECIFIED HEART FAILURE TYPE (HCC): ICD-10-CM

## 2021-11-15 DIAGNOSIS — E11.9 TYPE 2 DIABETES MELLITUS WITHOUT COMPLICATION, WITHOUT LONG-TERM CURRENT USE OF INSULIN (HCC): ICD-10-CM

## 2021-11-15 DIAGNOSIS — I50.32 CHRONIC DIASTOLIC HEART FAILURE DUE TO VALVULAR DISEASE (HCC): ICD-10-CM

## 2021-11-15 DIAGNOSIS — I38 CHRONIC DIASTOLIC HEART FAILURE DUE TO VALVULAR DISEASE (HCC): ICD-10-CM

## 2021-11-15 DIAGNOSIS — E78.5 HYPERLIPIDEMIA, UNSPECIFIED HYPERLIPIDEMIA TYPE: ICD-10-CM

## 2021-11-15 DIAGNOSIS — I10 HYPERTENSION, UNSPECIFIED TYPE: ICD-10-CM

## 2021-11-15 RX ORDER — DOXAZOSIN MESYLATE 4 MG/1
4 TABLET ORAL
Qty: 90 TABLET | Refills: 3 | Status: SHIPPED | OUTPATIENT
Start: 2021-11-15

## 2021-11-15 RX ORDER — BUMETANIDE 1 MG/1
1 TABLET ORAL DAILY
Qty: 90 TABLET | Refills: 2 | Status: SHIPPED | OUTPATIENT
Start: 2021-11-15

## 2021-11-15 RX ORDER — ATORVASTATIN CALCIUM 20 MG/1
20 TABLET, FILM COATED ORAL DAILY
Qty: 90 TABLET | Refills: 1 | Status: SHIPPED | OUTPATIENT
Start: 2021-11-15 | End: 2021-12-30 | Stop reason: SDUPTHER

## 2021-11-15 RX ORDER — ALLOPURINOL 100 MG/1
100 TABLET ORAL DAILY
Qty: 90 TABLET | Refills: 3 | Status: SHIPPED | OUTPATIENT
Start: 2021-11-15 | End: 2021-12-30 | Stop reason: SDUPTHER

## 2021-12-27 ENCOUNTER — RA CDI HCC (OUTPATIENT)
Dept: OTHER | Facility: HOSPITAL | Age: 86
End: 2021-12-27

## 2021-12-30 ENCOUNTER — OFFICE VISIT (OUTPATIENT)
Dept: FAMILY MEDICINE CLINIC | Facility: CLINIC | Age: 86
End: 2021-12-30
Payer: MEDICARE

## 2021-12-30 VITALS
DIASTOLIC BLOOD PRESSURE: 50 MMHG | HEART RATE: 72 BPM | OXYGEN SATURATION: 98 % | RESPIRATION RATE: 16 BRPM | WEIGHT: 215.2 LBS | BODY MASS INDEX: 31.87 KG/M2 | HEIGHT: 69 IN | TEMPERATURE: 96.5 F | SYSTOLIC BLOOD PRESSURE: 110 MMHG

## 2021-12-30 DIAGNOSIS — T82.897D AORTIC PROSTHETIC VALVE REGURGITATION, SUBSEQUENT ENCOUNTER: ICD-10-CM

## 2021-12-30 DIAGNOSIS — E87.1 HYPONATREMIA WITH EXCESS EXTRACELLULAR FLUID VOLUME: ICD-10-CM

## 2021-12-30 DIAGNOSIS — E78.00 PURE HYPERCHOLESTEROLEMIA: ICD-10-CM

## 2021-12-30 DIAGNOSIS — I11.0 HYPERTENSIVE HEART DISEASE WITH CONGESTIVE HEART FAILURE, UNSPECIFIED HEART FAILURE TYPE (HCC): ICD-10-CM

## 2021-12-30 DIAGNOSIS — I25.10 MULTIPLE VESSEL CORONARY ARTERY DISEASE: ICD-10-CM

## 2021-12-30 DIAGNOSIS — Z95.4 HISTORY OF HEART VALVE REPLACEMENT WITH TISSUE GRAFT: ICD-10-CM

## 2021-12-30 DIAGNOSIS — E11.9 TYPE 2 DIABETES MELLITUS WITHOUT COMPLICATION, WITHOUT LONG-TERM CURRENT USE OF INSULIN (HCC): ICD-10-CM

## 2021-12-30 DIAGNOSIS — I38 CHRONIC DIASTOLIC HEART FAILURE DUE TO VALVULAR DISEASE (HCC): ICD-10-CM

## 2021-12-30 DIAGNOSIS — E78.5 HYPERLIPIDEMIA, UNSPECIFIED HYPERLIPIDEMIA TYPE: ICD-10-CM

## 2021-12-30 DIAGNOSIS — Z95.0 PACEMAKER: ICD-10-CM

## 2021-12-30 DIAGNOSIS — I10 HYPERTENSION, UNSPECIFIED TYPE: ICD-10-CM

## 2021-12-30 DIAGNOSIS — I50.32 CHRONIC DIASTOLIC HEART FAILURE DUE TO VALVULAR DISEASE (HCC): ICD-10-CM

## 2021-12-30 DIAGNOSIS — I50.32 CHRONIC DIASTOLIC CONGESTIVE HEART FAILURE, NYHA CLASS 2 (HCC): Primary | ICD-10-CM

## 2021-12-30 DIAGNOSIS — E11.42 DIABETIC POLYNEUROPATHY ASSOCIATED WITH TYPE 2 DIABETES MELLITUS (HCC): ICD-10-CM

## 2021-12-30 DIAGNOSIS — R60.9 PERIPHERAL EDEMA: ICD-10-CM

## 2021-12-30 PROCEDURE — 99214 OFFICE O/P EST MOD 30 MIN: CPT | Performed by: INTERNAL MEDICINE

## 2021-12-30 RX ORDER — METFORMIN HYDROCHLORIDE 500 MG/1
500 TABLET, EXTENDED RELEASE ORAL
Qty: 90 TABLET | Refills: 3 | Status: SHIPPED | OUTPATIENT
Start: 2021-12-30

## 2021-12-30 RX ORDER — PIMECROLIMUS 10 MG/G
CREAM TOPICAL
COMMUNITY
Start: 2021-12-15

## 2021-12-30 RX ORDER — ATORVASTATIN CALCIUM 20 MG/1
20 TABLET, FILM COATED ORAL DAILY
Qty: 90 TABLET | Refills: 1 | Status: SHIPPED | OUTPATIENT
Start: 2021-12-30 | End: 2022-07-11

## 2021-12-30 RX ORDER — ALLOPURINOL 100 MG/1
100 TABLET ORAL DAILY
Qty: 90 TABLET | Refills: 3 | Status: SHIPPED | OUTPATIENT
Start: 2021-12-30

## 2022-02-03 ENCOUNTER — APPOINTMENT (OUTPATIENT)
Dept: LAB | Age: 87
End: 2022-02-03
Payer: MEDICARE

## 2022-02-03 DIAGNOSIS — I50.32 CHRONIC DIASTOLIC CONGESTIVE HEART FAILURE, NYHA CLASS 2 (HCC): ICD-10-CM

## 2022-02-03 DIAGNOSIS — E11.42 DIABETIC POLYNEUROPATHY ASSOCIATED WITH TYPE 2 DIABETES MELLITUS (HCC): ICD-10-CM

## 2022-02-03 LAB
ALBUMIN SERPL BCP-MCNC: 3.1 G/DL (ref 3.5–5)
ALP SERPL-CCNC: 85 U/L (ref 46–116)
ALT SERPL W P-5'-P-CCNC: 43 U/L (ref 12–78)
ANION GAP SERPL CALCULATED.3IONS-SCNC: 2 MMOL/L (ref 4–13)
AST SERPL W P-5'-P-CCNC: 40 U/L (ref 5–45)
BASOPHILS # BLD AUTO: 0.06 THOUSANDS/ΜL (ref 0–0.1)
BASOPHILS NFR BLD AUTO: 1 % (ref 0–1)
BILIRUB SERPL-MCNC: 0.71 MG/DL (ref 0.2–1)
BUN SERPL-MCNC: 18 MG/DL (ref 5–25)
CALCIUM ALBUM COR SERPL-MCNC: 9.8 MG/DL (ref 8.3–10.1)
CALCIUM SERPL-MCNC: 9.1 MG/DL (ref 8.3–10.1)
CHLORIDE SERPL-SCNC: 104 MMOL/L (ref 100–108)
CO2 SERPL-SCNC: 29 MMOL/L (ref 21–32)
CREAT SERPL-MCNC: 1.02 MG/DL (ref 0.6–1.3)
EOSINOPHIL # BLD AUTO: 0.34 THOUSAND/ΜL (ref 0–0.61)
EOSINOPHIL NFR BLD AUTO: 4 % (ref 0–6)
ERYTHROCYTE [DISTWIDTH] IN BLOOD BY AUTOMATED COUNT: 12.9 % (ref 11.6–15.1)
EST. AVERAGE GLUCOSE BLD GHB EST-MCNC: 137 MG/DL
GFR SERPL CREATININE-BSD FRML MDRD: 66 ML/MIN/1.73SQ M
GLUCOSE P FAST SERPL-MCNC: 151 MG/DL (ref 65–99)
HBA1C MFR BLD: 6.4 %
HCT VFR BLD AUTO: 37.9 % (ref 36.5–49.3)
HGB BLD-MCNC: 12.8 G/DL (ref 12–17)
IMM GRANULOCYTES # BLD AUTO: 0.07 THOUSAND/UL (ref 0–0.2)
IMM GRANULOCYTES NFR BLD AUTO: 1 % (ref 0–2)
LYMPHOCYTES # BLD AUTO: 1.99 THOUSANDS/ΜL (ref 0.6–4.47)
LYMPHOCYTES NFR BLD AUTO: 22 % (ref 14–44)
MCH RBC QN AUTO: 33.4 PG (ref 26.8–34.3)
MCHC RBC AUTO-ENTMCNC: 33.8 G/DL (ref 31.4–37.4)
MCV RBC AUTO: 99 FL (ref 82–98)
MONOCYTES # BLD AUTO: 0.76 THOUSAND/ΜL (ref 0.17–1.22)
MONOCYTES NFR BLD AUTO: 8 % (ref 4–12)
NEUTROPHILS # BLD AUTO: 6 THOUSANDS/ΜL (ref 1.85–7.62)
NEUTS SEG NFR BLD AUTO: 64 % (ref 43–75)
NRBC BLD AUTO-RTO: 0 /100 WBCS
PLATELET # BLD AUTO: 175 THOUSANDS/UL (ref 149–390)
PMV BLD AUTO: 9.9 FL (ref 8.9–12.7)
POTASSIUM SERPL-SCNC: 4.4 MMOL/L (ref 3.5–5.3)
PROT SERPL-MCNC: 7.1 G/DL (ref 6.4–8.2)
RBC # BLD AUTO: 3.83 MILLION/UL (ref 3.88–5.62)
SODIUM SERPL-SCNC: 135 MMOL/L (ref 136–145)
WBC # BLD AUTO: 9.22 THOUSAND/UL (ref 4.31–10.16)

## 2022-02-03 PROCEDURE — 36415 COLL VENOUS BLD VENIPUNCTURE: CPT

## 2022-02-03 PROCEDURE — 80053 COMPREHEN METABOLIC PANEL: CPT

## 2022-02-03 PROCEDURE — 83036 HEMOGLOBIN GLYCOSYLATED A1C: CPT

## 2022-02-03 PROCEDURE — 85025 COMPLETE CBC W/AUTO DIFF WBC: CPT

## 2022-02-07 RX ORDER — LANCETS 33 GAUGE
EACH MISCELLANEOUS
COMMUNITY

## 2022-02-08 ENCOUNTER — TELEMEDICINE (OUTPATIENT)
Dept: FAMILY MEDICINE CLINIC | Facility: CLINIC | Age: 87
End: 2022-02-08
Payer: MEDICARE

## 2022-02-08 VITALS
BODY MASS INDEX: 30.72 KG/M2 | SYSTOLIC BLOOD PRESSURE: 132 MMHG | HEART RATE: 68 BPM | DIASTOLIC BLOOD PRESSURE: 57 MMHG | TEMPERATURE: 98 F | WEIGHT: 208 LBS

## 2022-02-08 DIAGNOSIS — I34.0 NON-RHEUMATIC MITRAL REGURGITATION: ICD-10-CM

## 2022-02-08 DIAGNOSIS — I10 ESSENTIAL HYPERTENSION: ICD-10-CM

## 2022-02-08 DIAGNOSIS — Z95.0 PACEMAKER: ICD-10-CM

## 2022-02-08 DIAGNOSIS — G21.4 VASCULAR PARKINSONISM (HCC): ICD-10-CM

## 2022-02-08 DIAGNOSIS — B35.6: Primary | ICD-10-CM

## 2022-02-08 DIAGNOSIS — I50.32 CHRONIC DIASTOLIC CONGESTIVE HEART FAILURE, NYHA CLASS 2 (HCC): ICD-10-CM

## 2022-02-08 DIAGNOSIS — R41.89 COGNITIVE DECLINE: ICD-10-CM

## 2022-02-08 DIAGNOSIS — Z95.4 HISTORY OF HEART VALVE REPLACEMENT WITH TISSUE GRAFT: ICD-10-CM

## 2022-02-08 DIAGNOSIS — I70.203 ATHEROSCLEROSIS OF NATIVE ARTERY OF BOTH LOWER EXTREMITIES, WITH UNSPECIFIED PRESENCE OF CLINICAL MANIFESTATION (HCC): ICD-10-CM

## 2022-02-08 DIAGNOSIS — T82.897D AORTIC PROSTHETIC VALVE REGURGITATION, SUBSEQUENT ENCOUNTER: ICD-10-CM

## 2022-02-08 DIAGNOSIS — E11.42 DM TYPE 2 WITH DIABETIC PERIPHERAL NEUROPATHY (HCC): ICD-10-CM

## 2022-02-08 DIAGNOSIS — I25.10 MULTIPLE VESSEL CORONARY ARTERY DISEASE: ICD-10-CM

## 2022-02-08 PROCEDURE — 99443 PR PHYS/QHP TELEPHONE EVALUATION 21-30 MIN: CPT | Performed by: INTERNAL MEDICINE

## 2022-02-08 NOTE — PROGRESS NOTES
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  He will be referred to a neurologist who will hopefully has an interest in movements disorders and can comment on this  Patient continues to have burning and redness in both sides of his groin  It was suggested that he ask his dermatologist about using Triple paste AF as a stronger barrier cream   It sounds like his dermatologist has put him on gabapentin and he will have to be alert to avoid edema  Patient's echos were reviewed and his aortic prosthetic valve that has had a consistent gradient of 20-25 going back before 2018 and has been unchanged  He had mild-to-moderate aortic regurgitation but on the last echo in the fall had moderate regurgitation  This was when he was in the hospital   There was no significant mitral regurgitation noted  His ejection fraction was 60% and he had diastolic dysfunction level 1  No pulmonary hypertension      Next appointment in 6-10 weeks with laboratory studies  Problem List Items Addressed This Visit        Endocrine    DM type 2 with diabetic peripheral neuropathy [facility])       Cardiovascular and Mediastinum    Multiple vessel coronary artery disease    Non-rheumatic mitral regurgitation    Aortic prosthetic valve regurgitation    Chronic diastolic congestive heart failure, NYHA class 2 ([address] )    Atherosclerosis of native artery of extremity ([address] )    Essential hypertension       Nervous and Auditory    Vascular parkinsonism ([address] ) Musculoskeletal and Integument    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 - Primary       Other    History of heart valve replacement with tissue graft    Pacemaker    Cognitive decline          Recent Visits  No visits were found meeting these conditions  Showing recent visits within past 7 days and meeting all other requirements  Today's Visits  Date Type Provider Dept   02/08/22 Telemedicine Lala Patel MD P[facility]  [id number] today's visits and meeting all other requirements  Future Appointments  No visits were found meeting these conditions    Showing future appointments within next 150 days and meeting all other requirements         I spent 25 minutes directly with the patient during this visit

## 2022-02-08 NOTE — PROGRESS NOTES
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    Pacemaker    Chronic diastolic congestive heart failure, NYHA class 2 (HCC)    Multiple vessel coronary artery disease    Aortic prosthetic valve regurgitation, subsequent encounter    Non-rheumatic mitral regurgitation    Atherosclerosis of native artery of both lower extremities, with unspecified presence of clinical manifestation ([facility] [address] )    Essential hypertension    History of heart valve replacement with tissue graft    DM type 2 with diabetic peripheral neuropathy (HCC)    Other orders  -     Lancets (OneTouch Delica Plus [id number]) MISC; OneTouch Delica Plus Lancet 33 gauge        Subjective:     HPI     Patient ID: Estefany Ha is a 81 y o  male          Current Outpatient Medications:     allopurinol (ZYLOPRIM) 100 mg tablet, Take 1 tablet (100 mg total) by mouth daily, Disp: 90 tablet, Rfl: 3    amLODIPine (NORVASC) 2 5 mg tablet, Take 1 tablet (2 5 mg total) by mouth daily, Disp: 90 tablet, Rfl: 3    ascorbic acid (VITAMIN C) 500 MG tablet, Take 500 mg by mouth daily, Disp: , Rfl:     atorvastatin (LIPITOR) 20 mg tablet, Take 1 tablet (20 mg total) by mouth daily, Disp: 90 tablet, Rfl: 1    BABY ASPIRIN PO, Take 81 mg by mouth daily, Disp: , Rfl:     Blood Glucose Monitoring Suppl (ONE TOUCH ULTRA 2) w/Device KIT, by Does not apply route daily To test blood sugar 1x daily DX:diabetes, Disp: 1 each, Rfl: 0   bumetanide (BUMEX) 1 mg tablet, Take 1 tablet (1 mg total) by mouth daily, Disp: 90 tablet, Rfl: 2    clotrimazole-betamethasone (LOTRISONE) 1-0 05 % cream, Apply topically 2 (two) times a day, Disp: 45 g, Rfl: 1    fluticasone (FLONASE) 50 mcg/act nasal spray, 1 spray into each nostril 2 (two) times a day, Disp: 48 g, Rfl: 3    glucose blood test strip, OneTouch Ultra Test strips  USE TO TEST BLOOD SUGAR ONCE DAILY, Disp: , Rfl:     glucose blood test strip, OneTouch Ultra Test strips, Disp: , Rfl:     ketoconazole (NIZORAL) 2 % cream, , Disp: , Rfl:     Lancets (OneTouch Delica Plus [id number]) MISC, USE TO TEST BLOOD SUGARS ONCE DAILY, Disp: 100 each, Rfl: 6    Lancets (OneTouch Delica Plus [id number]) MISC, OneTouch Delica Plus Lancet 33 gauge, Disp: , Rfl:     metFORMIN (GLUCOPHAGE-XR) 500 mg 24 hr tablet, Take 1 tablet (500 mg total) by mouth daily with dinner, Disp: 90 tablet, Rfl: 3    mometasone (ELOCON) 0 1 % lotion, One drop affected ear canal daily at bedtime when necessary itching, Disp: 30 mL, Rfl: 0    nitroglycerin (NITROSTAT) 0 4 mg SL tablet, Place 1 tablet (0 4 mg total) under the tongue every 5 (five) minutes as needed for chest pain, Disp: 90 tablet, Rfl: 1    nystatin (MYCOSTATIN) cream, Apply topically 2 (two) times a day, Disp: 150 g, Rfl: 2    OneTouch Ultra test strip, USE TO TEST BLOOD SUGAR ONCE DAILY, Disp: 100 strip, Rfl: 6    pantoprazole (PROTONIX) 40 mg tablet, Take 1 tablet (40 mg total) by mouth daily at bedtime, Disp: 90 tablet, Rfl: 1    pimecrolimus (ELIDEL) 1 % cream, , Disp: , Rfl:     telmisartan (MICARDIS) 40 mg tablet, Take 1 tablet (40 mg total) by mouth daily, Disp: 90 tablet, Rfl: 3    triamcinolone (KENALOG) 0 1 % cream, , Disp: , Rfl:     doxazosin (CARDURA) 4 mg tablet, Take 1 tablet (4 mg total) by mouth daily at bedtime, Disp: 90 tablet, Rfl: 3    ipratropium (ATROVENT) 0 03 % nasal spray, 1 spray into each nostril 3 (three) times a day (Patient not taking: Reported on 7/29/2021), Disp: 30 mL, Rfl: 11    miconazole (MICOTIN) 2 % powder, Apply topically as needed for itching (Patient not taking: Reported on 2/8/2022 ), Disp: 70 g, Rfl: 5    nystatin (MYCOSTATIN) powder, Apply topically 3 (three) times a day (Patient not taking: Reported on 2/8/2022 ), Disp: 60 g, Rfl: 1    Zoster Vac Recomb Adjuvanted (Shingrix) 50 MCG/0 5ML SUSR, Shingrix (PF) 50 mcg/0 5 mL intramuscular suspension, kit (Patient not taking: Reported on 2/8/2022), Disp: , Rfl:     The following portions of the patient's history were reviewed and updated as appropriate: allergies, current medications, past family history, past medical history, past social history, past surgical history and problem list     Review of Systems      Family History   Problem Relation Age of Onset    Diabetes Mother     Alcohol abuse Brother     Bipolar disorder Brother     Hypertension Brother     Kidney disease Brother     Heart disease Family     Diabetes type II Family        Past Medical History:   Diagnosis Date    Aortic valve regurgitation 01/26/2017    Arthritis     Cancer ([facility]     Coronary artery disease     Coronary artery disease with angina pectoris ([facility] ) 3/19/2019    Edema 12/02/2016    GERD (gastroesophageal reflux disease)     Heart disease     Heart valve disorder     Heart valve replaced     History of basal cell cancer     HL (hearing loss)     Wears hearing aids    Hx of tissue graft 03/29/2017    Hyperlipemia 01/26/2017    Hypertension     Non-rheumatic mitral regurgitation 10/21/2016    Right bundle branch block (RBBB), 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       Past Surgical History:   Procedure Laterality Date    ADENOIDECTOMY      APPENDECTOMY      CARDIAC SURGERY      CARDIAC VALVE REPLACEMENT      EYE SURGERY      HERNIA REPAIR      TONSILLECTOMY      VASECTOMY         Social History     Socioeconomic History  Marital status: /Civil Union     Spouse name: Not on file    Number of children: 3    Years of education: Not on file    Highest education level: Not on file   Occupational History    Occupation:  retired [employer]   Tobacco Use    Smoking status: Former Smoker     Packs/day: 0 50     Types: Cigarettes, Cigars    Smokeless tobacco: Former User    Tobacco comment: quit 10 years ago, NEVER A SMOKER AS PER NEXTGEN   Vaping Use    Vaping Use: Never used   Substance and Sexual Activity    Alcohol use: No    Drug use: No    Sexual activity: Yes     Partners: Female   Other Topics Concern    Not on file   Social History Narrative    PT states he does not drink any caffeine      Social Determinants of Health     Financial Resource Strain: Not on file   Food Insecurity: Not on file   Transportation Needs: Not on file   Physical Activity: Not on file   Stress: Not on file   Social Connections: Not on file   Intimate Partner Violence: Not on file   Housing Stability: Not on file       Allergies   Allergen Reactions    Orphenadrine GI Intolerance            Objective:    /57 Comment: home measurment  Pulse 68   Temp 98 °F (36 7 °C)   Wt 94 3 kg (208 lb)   BMI 30 72 kg/m²        Physical Exam

## 2022-02-21 DIAGNOSIS — E11.9 TYPE 2 DIABETES MELLITUS WITHOUT COMPLICATION, WITHOUT LONG-TERM CURRENT USE OF INSULIN (HCC): ICD-10-CM

## 2022-02-22 DIAGNOSIS — E11.9 TYPE 2 DIABETES MELLITUS WITHOUT COMPLICATION, WITHOUT LONG-TERM CURRENT USE OF INSULIN (HCC): ICD-10-CM

## 2022-02-22 RX ORDER — LANCETS 33 GAUGE
EACH MISCELLANEOUS
Qty: 100 EACH | Refills: 6 | Status: SHIPPED | OUTPATIENT
Start: 2022-02-22

## 2022-03-10 DIAGNOSIS — K21.00 GERD WITH ESOPHAGITIS: ICD-10-CM

## 2022-03-10 RX ORDER — PANTOPRAZOLE SODIUM 40 MG/1
40 TABLET, DELAYED RELEASE ORAL
Qty: 90 TABLET | Refills: 1 | Status: SHIPPED | OUTPATIENT
Start: 2022-03-10

## 2022-03-25 ENCOUNTER — RA CDI HCC (OUTPATIENT)
Dept: OTHER | Facility: HOSPITAL | Age: 87
End: 2022-03-25

## 2022-03-25 ENCOUNTER — APPOINTMENT (OUTPATIENT)
Dept: LAB | Age: 87
End: 2022-03-25
Payer: MEDICARE

## 2022-03-25 DIAGNOSIS — I50.32 CHRONIC DIASTOLIC CONGESTIVE HEART FAILURE, NYHA CLASS 2 (HCC): ICD-10-CM

## 2022-03-25 DIAGNOSIS — E11.42 DM TYPE 2 WITH DIABETIC PERIPHERAL NEUROPATHY (HCC): ICD-10-CM

## 2022-03-25 LAB
ALBUMIN SERPL BCP-MCNC: 3.1 G/DL (ref 3.5–5)
ALP SERPL-CCNC: 89 U/L (ref 46–116)
ALT SERPL W P-5'-P-CCNC: 41 U/L (ref 12–78)
ANION GAP SERPL CALCULATED.3IONS-SCNC: 4 MMOL/L (ref 4–13)
AST SERPL W P-5'-P-CCNC: 36 U/L (ref 5–45)
BASOPHILS # BLD AUTO: 0.06 THOUSANDS/ΜL (ref 0–0.1)
BASOPHILS NFR BLD AUTO: 1 % (ref 0–1)
BILIRUB SERPL-MCNC: 0.56 MG/DL (ref 0.2–1)
BUN SERPL-MCNC: 24 MG/DL (ref 5–25)
CALCIUM ALBUM COR SERPL-MCNC: 10 MG/DL (ref 8.3–10.1)
CALCIUM SERPL-MCNC: 9.3 MG/DL (ref 8.3–10.1)
CHLORIDE SERPL-SCNC: 105 MMOL/L (ref 100–108)
CO2 SERPL-SCNC: 28 MMOL/L (ref 21–32)
CREAT SERPL-MCNC: 1.09 MG/DL (ref 0.6–1.3)
EOSINOPHIL # BLD AUTO: 0.39 THOUSAND/ΜL (ref 0–0.61)
EOSINOPHIL NFR BLD AUTO: 5 % (ref 0–6)
ERYTHROCYTE [DISTWIDTH] IN BLOOD BY AUTOMATED COUNT: 12.7 % (ref 11.6–15.1)
GFR SERPL CREATININE-BSD FRML MDRD: 61 ML/MIN/1.73SQ M
GLUCOSE P FAST SERPL-MCNC: 156 MG/DL (ref 65–99)
HCT VFR BLD AUTO: 38.2 % (ref 36.5–49.3)
HGB BLD-MCNC: 12.5 G/DL (ref 12–17)
IMM GRANULOCYTES # BLD AUTO: 0.05 THOUSAND/UL (ref 0–0.2)
IMM GRANULOCYTES NFR BLD AUTO: 1 % (ref 0–2)
LYMPHOCYTES # BLD AUTO: 1.9 THOUSANDS/ΜL (ref 0.6–4.47)
LYMPHOCYTES NFR BLD AUTO: 22 % (ref 14–44)
MCH RBC QN AUTO: 33.2 PG (ref 26.8–34.3)
MCHC RBC AUTO-ENTMCNC: 32.7 G/DL (ref 31.4–37.4)
MCV RBC AUTO: 101 FL (ref 82–98)
MONOCYTES # BLD AUTO: 0.74 THOUSAND/ΜL (ref 0.17–1.22)
MONOCYTES NFR BLD AUTO: 9 % (ref 4–12)
NEUTROPHILS # BLD AUTO: 5.35 THOUSANDS/ΜL (ref 1.85–7.62)
NEUTS SEG NFR BLD AUTO: 62 % (ref 43–75)
NRBC BLD AUTO-RTO: 0 /100 WBCS
PLATELET # BLD AUTO: 173 THOUSANDS/UL (ref 149–390)
PMV BLD AUTO: 10.2 FL (ref 8.9–12.7)
POTASSIUM SERPL-SCNC: 4.4 MMOL/L (ref 3.5–5.3)
PROT SERPL-MCNC: 6.8 G/DL (ref 6.4–8.2)
RBC # BLD AUTO: 3.77 MILLION/UL (ref 3.88–5.62)
SODIUM SERPL-SCNC: 137 MMOL/L (ref 136–145)
WBC # BLD AUTO: 8.49 THOUSAND/UL (ref 4.31–10.16)

## 2022-03-25 PROCEDURE — 80053 COMPREHEN METABOLIC PANEL: CPT

## 2022-03-25 PROCEDURE — 85025 COMPLETE CBC W/AUTO DIFF WBC: CPT

## 2022-03-25 PROCEDURE — 36415 COLL VENOUS BLD VENIPUNCTURE: CPT

## 2022-03-25 NOTE — PROGRESS NOTES
[facility]  coding opportunities        I11 0  Chart Reviewed number of suggestions sent to Provider: 1     Patients Insurance     Medicare Insurance: [employer]

## 2022-03-31 ENCOUNTER — TELEPHONE (OUTPATIENT)
Dept: FAMILY MEDICINE CLINIC | Facility: CLINIC | Age: 87
End: 2022-03-31

## 2022-03-31 ENCOUNTER — OFFICE VISIT (OUTPATIENT)
Dept: FAMILY MEDICINE CLINIC | Facility: CLINIC | Age: 87
End: 2022-03-31
Payer: MEDICARE

## 2022-03-31 VITALS
SYSTOLIC BLOOD PRESSURE: 100 MMHG | DIASTOLIC BLOOD PRESSURE: 68 MMHG | OXYGEN SATURATION: 96 % | RESPIRATION RATE: 16 BRPM | BODY MASS INDEX: 32.02 KG/M2 | HEART RATE: 81 BPM | WEIGHT: 216.2 LBS | TEMPERATURE: 97.8 F | HEIGHT: 69 IN

## 2022-03-31 DIAGNOSIS — R60.9 EDEMA, UNSPECIFIED TYPE: ICD-10-CM

## 2022-03-31 DIAGNOSIS — Z95.4 HISTORY OF HEART VALVE REPLACEMENT WITH TISSUE GRAFT: ICD-10-CM

## 2022-03-31 DIAGNOSIS — E78.00 PURE HYPERCHOLESTEROLEMIA: ICD-10-CM

## 2022-03-31 DIAGNOSIS — E87.1 HYPONATREMIA WITH EXCESS EXTRACELLULAR FLUID VOLUME: ICD-10-CM

## 2022-03-31 DIAGNOSIS — R41.89 COGNITIVE DECLINE: ICD-10-CM

## 2022-03-31 DIAGNOSIS — Z95.0 PACEMAKER: ICD-10-CM

## 2022-03-31 DIAGNOSIS — R26.2 AMBULATORY DYSFUNCTION: ICD-10-CM

## 2022-03-31 DIAGNOSIS — B35.6: ICD-10-CM

## 2022-03-31 DIAGNOSIS — I10 ESSENTIAL HYPERTENSION: ICD-10-CM

## 2022-03-31 DIAGNOSIS — I50.32 CHRONIC DIASTOLIC CONGESTIVE HEART FAILURE, NYHA CLASS 2 (HCC): Primary | ICD-10-CM

## 2022-03-31 DIAGNOSIS — G21.4 VASCULAR PARKINSONISM (HCC): ICD-10-CM

## 2022-03-31 DIAGNOSIS — Z95.2 HISTORY OF AORTIC VALVE REPLACEMENT: ICD-10-CM

## 2022-03-31 PROCEDURE — 99214 OFFICE O/P EST MOD 30 MIN: CPT | Performed by: INTERNAL MEDICINE

## 2022-03-31 RX ORDER — APIXABAN 5 MG/1
5 TABLET, FILM COATED ORAL 2 TIMES DAILY
COMMUNITY
Start: 2022-03-15

## 2022-03-31 NOTE — PROGRESS NOTES
Diabetic Foot Exam    Patient's shoes and socks removed  Right Foot/Ankle   Right Foot Inspection  Skin Exam: dry skin, callus and callus  Sensory   Monofilament testing: intact    Vascular  Capillary refills: < 3 seconds  The right DP pulse is 2+  The right PT pulse is 0  Left Foot/Ankle  Left Foot Inspection  Skin Exam: dry skin       Sensory   Monofilament testing: intact    Vascular  Capillary refills: < 3 seconds        Assign Risk Category  Deformity present  No loss of protective sensation  No weak pulses  Risk: 0

## 2022-03-31 NOTE — PROGRESS NOTES
Assessment/Plan: This 55-year-old male is noted to be more alert and communicative at this visit although with the continues to have a small scab gait and slowness of movement  It is cognitive decline is not so noticeable today  He has not yet seen his neurologic referral for a 2nd opinion  Patient is tinea corporis and tinea pedis are much improved  He will use an anti fungal cream for his feet as well  Diet reviewed  Lifestyle modifications reviewed  Medications reviewed and ordered  Laboratory tests and studies reviewed and ordered  All patient's questions answered to patient satisfaction  Chief Complaint   Patient presents with    Follow-up     labs done 3/25/22    Diabetes         Diagnoses and all orders for this visit:    Chronic diastolic congestive heart failure, NYHA class 2 (HCC)    Essential hypertension    Vascular parkinsonism (HCC)    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    History of aortic valve replacement    History of heart valve replacement with tissue graft    Pure hypercholesterolemia    Hyponatremia with excess extracellular fluid volume    Edema, unspecified type    Ambulatory dysfunction    Pacemaker    Cognitive decline    Other orders  -     Eliquis 5 MG; Take 5 mg by mouth 2 (two) times a day        Subjective: This 55-year-old male is noted to be more alert and communicative at this visit although with the continues to have a small scab gait and slowness of movement  It is cognitive decline is not so noticeable today  He has not yet seen his neurologic referral for a 2nd opinion  Patient is tinea corporis and tinea pedis are much improved  He will use an anti fungal cream for his feet as well  Patient ID: Vitaly Montanez is a 81 y o  male          Current Outpatient Medications:     allopurinol (ZYLOPRIM) 100 mg tablet, Take 1 tablet (100 mg total) by mouth daily, Disp: 90 tablet, Rfl: 3    amLODIPine (NORVASC) 2 5 mg tablet, Take 1 tablet (2 5 mg total) by mouth daily, Disp: 90 tablet, Rfl: 3    ascorbic acid (VITAMIN C) 500 MG tablet, Take 500 mg by mouth daily, Disp: , Rfl:     atorvastatin (LIPITOR) 20 mg tablet, Take 1 tablet (20 mg total) by mouth daily, Disp: 90 tablet, Rfl: 1    BABY ASPIRIN PO, Take 81 mg by mouth daily, Disp: , Rfl:     Blood Glucose Monitoring Suppl (ONE TOUCH ULTRA 2) w/Device KIT, by Does not apply route daily To test blood sugar 1x daily DX:diabetes, Disp: 1 each, Rfl: 0    bumetanide (BUMEX) 1 mg tablet, Take 1 tablet (1 mg total) by mouth daily, Disp: 90 tablet, Rfl: 2    clotrimazole-betamethasone (LOTRISONE) 1-0 05 % cream, Apply topically 2 (two) times a day, Disp: 45 g, Rfl: 1    doxazosin (CARDURA) 4 mg tablet, Take 1 tablet (4 mg total) by mouth daily at bedtime, Disp: 90 tablet, Rfl: 3    Eliquis 5 MG, Take 5 mg by mouth 2 (two) times a day, Disp: , Rfl:     fluticasone (FLONASE) 50 mcg/act nasal spray, 1 spray into each nostril 2 (two) times a day, Disp: 48 g, Rfl: 3    glucose blood test strip, OneTouch Ultra Test strips  USE TO TEST BLOOD SUGAR ONCE DAILY, Disp: , Rfl:     ketoconazole (NIZORAL) 2 % cream, , Disp: , Rfl:     Lancets (OneTouch Delica Plus [id number]) MISC, USE TO TEST BLOOD SUGARS ONCE DAILY, Disp: 100 each, Rfl: 6    metFORMIN (GLUCOPHAGE-XR) 500 mg 24 hr tablet, Take 1 tablet (500 mg total) by mouth daily with dinner, Disp: 90 tablet, Rfl: 3    mometasone (ELOCON) 0 1 % lotion, One drop affected ear canal daily at bedtime when necessary itching, Disp: 30 mL, Rfl: 0    nitroglycerin (NITROSTAT) 0 4 mg SL tablet, Place 1 tablet (0 4 mg total) under the tongue every 5 (five) minutes as needed for chest pain, Disp: 90 tablet, Rfl: 1    nystatin (MYCOSTATIN) cream, Apply topically 2 (two) times a day, Disp: 150 g, Rfl: 2    pantoprazole (PROTONIX) 40 mg tablet, Take 1 tablet (40 mg total) by mouth daily at bedtime, Disp: 90 tablet, Rfl: 1    pimecrolimus (ELIDEL) 1 % cream, , Disp: , Rfl:     telmisartan (MICARDIS) 40 mg tablet, Take 1 tablet (40 mg total) by mouth daily, Disp: 90 tablet, Rfl: 3    triamcinolone (KENALOG) 0 1 % cream, , Disp: , Rfl:     glucose blood test strip, OneTouch Ultra Test strips (Patient not taking: Reported on 3/31/2022), Disp: , Rfl:     ipratropium (ATROVENT) 0 03 % nasal spray, 1 spray into each nostril 3 (three) times a day (Patient not taking: Reported on 7/29/2021), Disp: 30 mL, Rfl: 11    Lancets (OneTouch Delica Plus [id number]) MISC, OneTouch Delica Plus Lancet 33 gauge (Patient not taking: Reported on 3/31/2022), Disp: , Rfl:     Lancets (OneTouch Delica Plus [id number]) MISC, USE TO TEST BLOOD SUGARS ONCE DAILY (Patient not taking: Reported on 3/31/2022 ), Disp: 100 each, Rfl: 6    miconazole (MICOTIN) 2 % powder, Apply topically as needed for itching (Patient not taking: Reported on 2/8/2022 ), Disp: 70 g, Rfl: 5    nystatin (MYCOSTATIN) powder, Apply topically 3 (three) times a day (Patient not taking: Reported on 2/8/2022 ), Disp: 60 g, Rfl: 1    OneTouch Ultra test strip, USE TO TEST BLOOD SUGAR ONCE DAILY (Patient not taking: Reported on 3/31/2022), Disp: 100 strip, Rfl: 6    Zoster Vac Recomb Adjuvanted (Shingrix) 50 MCG/0 5ML SUSR, Shingrix (PF) 50 mcg/0 5 mL intramuscular suspension, kit (Patient not taking: Reported on 2/8/2022), Disp: , Rfl:     The following portions of the patient's history were reviewed and updated as appropriate: allergies, current medications, past family history, past medical history, past social history, past surgical history and problem list     Review of Systems   Constitutional: Negative for appetite change, fatigue, fever and unexpected weight change  HENT: Negative for rhinorrhea, sinus pressure, sinus pain, sneezing and sore throat  Eyes: Negative for visual disturbance  Respiratory: Negative for cough, chest tightness, shortness of breath and wheezing      Cardiovascular: Negative for chest pain, palpitations and leg swelling  Gastrointestinal: Negative for abdominal distention, abdominal pain, blood in stool, constipation, diarrhea, nausea and vomiting  Endocrine: Negative for polydipsia and polyuria  Genitourinary: Negative for decreased urine volume, difficulty urinating, dysuria, hematuria and urgency  Musculoskeletal: Negative for arthralgias, back pain, joint swelling and neck pain  Skin: Negative for rash  Allergic/Immunologic: Negative for environmental allergies  Neurological: Negative for tremors, weakness, light-headedness, numbness and headaches  Hematological: Does not bruise/bleed easily  Psychiatric/Behavioral: Negative for agitation, behavioral problems, confusion and dysphoric mood  The patient is not nervous/anxious            Family History   Problem Relation Age of Onset    Diabetes Mother     Alcohol abuse Brother     Bipolar disorder Brother     Hypertension Brother     Kidney disease Brother     Heart disease Family     Diabetes type II Family        Past Medical History:   Diagnosis Date    Aortic valve regurgitation 01/26/2017    Arthritis     Cancer ([facility] [address]     Coronary artery disease     Coronary artery disease with angina pectoris ([facility] [address] ) 3/19/2019    Edema 12/02/2016    GERD (gastroesophageal reflux disease)     Heart disease     Heart valve disorder     Heart valve replaced     History of basal cell cancer     HL (hearing loss)     Wears hearing aids    Hx of tissue graft 03/29/2017    Hyperlipemia 01/26/2017    Hypertension     Non-rheumatic mitral regurgitation 10/21/2016    Obesity     Right bundle branch block (RBBB), 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       Past Surgical History:   Procedure Laterality Date    ADENOIDECTOMY      APPENDECTOMY      CARDIAC SURGERY      CARDIAC VALVE REPLACEMENT      EYE SURGERY      HERNIA REPAIR      TONSILLECTOMY      VASECTOMY         Social History     Socioeconomic History    Marital status: /Civil Union     Spouse name: None    Number of children: 3    Years of education: None    Highest education level: None   Occupational History    Occupation:  retired [employer]   Tobacco Use    Smoking status: Former Smoker     Packs/day: 0 50     Types: Cigarettes, Cigars    Smokeless tobacco: Former User    Tobacco comment: quit 10 years ago, NEVER A SMOKER AS PER N[employer]GEN   Vaping Use    Vaping Use: Never used   Substance and Sexual Activity    Alcohol use: No    Drug use: No    Sexual activity: Not Currently     Partners: Female   Other Topics Concern    None   Social History Narrative    PT states he does not drink any caffeine      Social Determinants of Health     Financial Resource Strain: Not on file   Food Insecurity: Not on file   Transportation Needs: Not on file   Physical Activity: Not on file   Stress: Not on file   Social Connections: Not on file   Intimate Partner Violence: Not on file   Housing Stability: Not on file       Allergies   Allergen Reactions    Orphenadrine GI Intolerance            Objective:    /68 (BP Location: Left arm, Patient Position: Sitting, Cuff Size: Standard)   Pulse 81   Temp 97 8 °F (36 6 °C) (Temporal)   Resp 16   Ht 5' 9" (1 753 m)   Wt 98 1 kg (216 lb 3 2 oz)   SpO2 96%   BMI 31 93 kg/m²        Physical Exam  Constitutional:       General: He is not in acute distress  Appearance: He is well-developed  HENT:      Head: Normocephalic and atraumatic  Nose: Nose normal       Mouth/Throat:      Pharynx: No oropharyngeal exudate  Eyes:      General: No scleral icterus  Conjunctiva/sclera: Conjunctivae normal       Pupils: Pupils are equal, round, and reactive to light  Neck:      Thyroid: No thyromegaly  Vascular: No JVD  Trachea: No tracheal deviation  Cardiovascular:      Rate and Rhythm: Normal rate and regular rhythm  Heart sounds: Murmur heard     No friction rub  No gallop  Pulmonary:      Effort: Pulmonary effort is normal  No respiratory distress  Breath sounds: No wheezing or rales  Chest:      Chest wall: No tenderness  Abdominal:      General: Bowel sounds are normal  There is no distension  Palpations: Abdomen is soft  There is no mass  Tenderness: There is no abdominal tenderness  There is no guarding or rebound  Musculoskeletal:         General: No deformity  Normal range of motion  Cervical back: Normal range of motion and neck supple  Right lower leg: Edema present  Left lower leg: Edema present  Comments: Plus one edema bilaterally   Lymphadenopathy:      Cervical: No cervical adenopathy  Skin:     General: Skin is warm and dry  Findings: No rash  Neurological:      Mental Status: He is alert  Cranial Nerves: No cranial nerve deficit  Motor: Weakness present  Coordination: Coordination normal       Gait: Gait abnormal    Psychiatric:         Behavior: Behavior normal          Thought Content:  Thought content normal

## 2022-04-13 DIAGNOSIS — I10 BENIGN ESSENTIAL HYPERTENSION: ICD-10-CM

## 2022-04-13 RX ORDER — TELMISARTAN 40 MG/1
40 TABLET ORAL DAILY
Qty: 90 TABLET | Refills: 3 | Status: SHIPPED | OUTPATIENT
Start: 2022-04-13

## 2022-05-25 ENCOUNTER — TELEPHONE (OUTPATIENT)
Dept: FAMILY MEDICINE CLINIC | Facility: CLINIC | Age: 87
End: 2022-05-25

## 2022-06-02 ENCOUNTER — OFFICE VISIT (OUTPATIENT)
Dept: FAMILY MEDICINE CLINIC | Facility: CLINIC | Age: 87
End: 2022-06-02
Payer: MEDICARE

## 2022-06-02 VITALS
RESPIRATION RATE: 16 BRPM | HEART RATE: 78 BPM | SYSTOLIC BLOOD PRESSURE: 150 MMHG | DIASTOLIC BLOOD PRESSURE: 60 MMHG | OXYGEN SATURATION: 98 % | TEMPERATURE: 97.4 F | BODY MASS INDEX: 31.76 KG/M2 | HEIGHT: 69 IN | WEIGHT: 214.4 LBS

## 2022-06-02 DIAGNOSIS — G21.4 VASCULAR PARKINSONISM (HCC): ICD-10-CM

## 2022-06-02 DIAGNOSIS — E11.42 DM TYPE 2 WITH DIABETIC PERIPHERAL NEUROPATHY (HCC): ICD-10-CM

## 2022-06-02 DIAGNOSIS — E87.1 HYPONATREMIA WITH EXCESS EXTRACELLULAR FLUID VOLUME: ICD-10-CM

## 2022-06-02 DIAGNOSIS — R60.9 EDEMA, UNSPECIFIED TYPE: ICD-10-CM

## 2022-06-02 DIAGNOSIS — I73.9 PERIPHERAL ARTERY INSUFFICIENCY (HCC): ICD-10-CM

## 2022-06-02 DIAGNOSIS — T82.897D AORTIC PROSTHETIC VALVE REGURGITATION, SUBSEQUENT ENCOUNTER: ICD-10-CM

## 2022-06-02 DIAGNOSIS — R26.2 AMBULATORY DYSFUNCTION: ICD-10-CM

## 2022-06-02 DIAGNOSIS — E11.9 TYPE 2 DIABETES MELLITUS WITHOUT COMPLICATION, WITHOUT LONG-TERM CURRENT USE OF INSULIN (HCC): Primary | ICD-10-CM

## 2022-06-02 DIAGNOSIS — K59.01 SLOW TRANSIT CONSTIPATION: ICD-10-CM

## 2022-06-02 PROCEDURE — 83036 HEMOGLOBIN GLYCOSYLATED A1C: CPT | Performed by: INTERNAL MEDICINE

## 2022-06-02 PROCEDURE — 99214 OFFICE O/P EST MOD 30 MIN: CPT | Performed by: INTERNAL MEDICINE

## 2022-06-02 RX ORDER — HYDROXYZINE HYDROCHLORIDE 10 MG/1
TABLET, FILM COATED ORAL
COMMUNITY
Start: 2022-05-09 | End: 2022-01-01 | Stop reason: SDUPTHER

## 2022-06-02 NOTE — PROGRESS NOTES
Assessment/Plan:  Patient continues to have difficulty with constipation and will increase his senna to twice daily from once daily as necessary  He takes prune juice 4 oz daily in addition  Patient has appointments with movement disorders specialists in July and August   He continues to cogwheeling and gait disorder  He been playing solitaire successfully and appears to have preserved math skills  Serum sodium was 132 in May and continues on fluid restriction  Diet reviewed  Lifestyle modifications reviewed  Medications reviewed and ordered  Laboratory tests and studies reviewed and ordered  All patient's questions answered to patient satisfaction  Chief Complaint   Patient presents with    Follow-up    Discuss seeing a nuerologist     Concerns about Blood pressure          Diagnoses and all orders for this visit:    Type 2 diabetes mellitus without complication, without long-term current use of insulin ([facility] [address] )  -     POCT hemoglobin A1c        Subjective:     Patient continues to have difficulty with constipation and will increase his senna to twice daily from once daily as necessary  He takes prune juice 4 oz daily in addition  Patient has appointments with movement disorders specialists in July and August   He continues to cogwheeling and gait disorder  He been playing solitaire successfully and appears to have preserved math skills  Serum sodium was 132 in May and continues on fluid restriction  Patient ID: Sara Camejo is a 81 y o  male          Current Outpatient Medications:     allopurinol (ZYLOPRIM) 100 mg tablet, Take 1 tablet (100 mg total) by mouth daily, Disp: 90 tablet, Rfl: 3    amLODIPine (NORVASC) 2 5 mg tablet, Take 1 tablet (2 5 mg total) by mouth daily, Disp: 90 tablet, Rfl: 3    ascorbic acid (VITAMIN C) 500 MG tablet, Take 500 mg by mouth daily, Disp: , Rfl:     atorvastatin (LIPITOR) 20 mg tablet, Take 1 tablet (20 mg total) by mouth daily, Disp: 90 tablet, Rfl: 1   BABY ASPIRIN PO, Take 81 mg by mouth daily, Disp: , Rfl:     Blood Glucose Monitoring Suppl (ONE TOUCH ULTRA 2) w/Device KIT, by Does not apply route daily To test blood sugar 1x daily DX:diabetes, Disp: 1 each, Rfl: 0    bumetanide (BUMEX) 1 mg tablet, Take 1 tablet (1 mg total) by mouth daily, Disp: 90 tablet, Rfl: 2    clotrimazole-betamethasone (LOTRISONE) 1-0 05 % cream, Apply topically 2 (two) times a day, Disp: 45 g, Rfl: 1    doxazosin (CARDURA) 4 mg tablet, Take 1 tablet (4 mg total) by mouth daily at bedtime, Disp: 90 tablet, Rfl: 3    Eliquis 5 MG, Take 5 mg by mouth 2 (two) times a day, Disp: , Rfl:     fluticasone (FLONASE) 50 mcg/act nasal spray, 1 spray into each nostril 2 (two) times a day, Disp: 48 g, Rfl: 3    glucose blood test strip, OneTouch Ultra Test strips  USE TO TEST BLOOD SUGAR ONCE DAILY, Disp: , Rfl:     glucose blood test strip, OneTouch Ultra Test strips (Patient not taking: No sig reported), Disp: , Rfl:     hydrOXYzine HCL (ATARAX) 10 mg tablet, TAKE 1 TABLET BY MOUTH THREE TIMES DAILY   MAY CAUSE DROWSINESS, Disp: , Rfl:     ipratropium (ATROVENT) 0 03 % nasal spray, 1 spray into each nostril 3 (three) times a day (Patient not taking: Reported on 7/29/2021), Disp: 30 mL, Rfl: 11    ketoconazole (NIZORAL) 2 % cream, , Disp: , Rfl:     Lancets (OneTouch Delica Plus [id number]) MISC, OneTouch Delica Plus Lancet 33 gauge (Patient not taking: No sig reported), Disp: , Rfl:     Lancets (OneTouch Delica Plus [id number]) MISC, USE TO TEST BLOOD SUGARS ONCE DAILY, Disp: 100 each, Rfl: 6    Lancets (OneTouch Delica Plus [id number]) MISC, USE TO TEST BLOOD SUGARS ONCE DAILY (Patient not taking: No sig reported), Disp: 100 each, Rfl: 6    metFORMIN (GLUCOPHAGE-XR) 500 mg 24 hr tablet, Take 1 tablet (500 mg total) by mouth daily with dinner, Disp: 90 tablet, Rfl: 3    miconazole (MICOTIN) 2 % powder, Apply topically as needed for itching (Patient not taking: No sig reported), Disp: 70 g, Rfl: 5    mometasone (ELOCON) 0 1 % lotion, One drop affected ear canal daily at bedtime when necessary itching, Disp: 30 mL, Rfl: 0    nitroglycerin (NITROSTAT) 0 4 mg SL tablet, Place 1 tablet (0 4 mg total) under the tongue every 5 (five) minutes as needed for chest pain, Disp: 90 tablet, Rfl: 1    nystatin (MYCOSTATIN) cream, Apply topically 2 (two) times a day, Disp: 150 g, Rfl: 2    nystatin (MYCOSTATIN) powder, Apply topically 3 (three) times a day (Patient not taking: No sig reported), Disp: 60 g, Rfl: 1    OneTouch Ultra test strip, USE TO TEST BLOOD SUGAR ONCE DAILY (Patient not taking: No sig reported), Disp: 100 strip, Rfl: 6    pantoprazole (PROTONIX) 40 mg tablet, Take 1 tablet (40 mg total) by mouth daily at bedtime, Disp: 90 tablet, Rfl: 1    pimecrolimus (ELIDEL) 1 % cream, , Disp: , Rfl:     telmisartan (MICARDIS) 40 mg tablet, Take 1 tablet (40 mg total) by mouth daily, Disp: 90 tablet, Rfl: 3    triamcinolone (KENALOG) 0 1 % cream, , Disp: , Rfl:     Zoster Vac Recomb Adjuvanted 50 MCG/0 5ML SUSR, Shingrix (PF) 50 mcg/0 5 mL intramuscular suspension, kit (Patient not taking: No sig reported), Disp: , Rfl:     The following portions of the patient's history were reviewed and updated as appropriate: allergies, current medications, past family history, past medical history, past social history, past surgical history and problem list     Review of Systems   Constitutional: Negative for appetite change, fatigue, fever and unexpected weight change  HENT: Negative for rhinorrhea, sinus pressure, sinus pain, sneezing and sore throat  Eyes: Negative for visual disturbance  Respiratory: Negative for cough, chest tightness, shortness of breath and wheezing  Cardiovascular: Negative for chest pain, palpitations and leg swelling  Gastrointestinal: Positive for constipation  Negative for abdominal distention, abdominal pain, blood in stool, diarrhea, nausea and vomiting  Endocrine: Negative for polydipsia and polyuria  Genitourinary: Negative for decreased urine volume, difficulty urinating, dysuria, hematuria and urgency  Musculoskeletal: Negative for arthralgias, back pain, joint swelling and neck pain  Skin: Negative for rash  Allergic/Immunologic: Negative for environmental allergies  Neurological: Positive for weakness  Negative for tremors, light-headedness, numbness and headaches  Hematological: Does not bruise/bleed easily  Psychiatric/Behavioral: Negative for agitation, behavioral problems, confusion and dysphoric mood  The patient is not nervous/anxious            Family History   Problem Relation Age of Onset    Diabetes Mother     Alcohol abuse Brother     Bipolar disorder Brother     Hypertension Brother     Kidney disease Brother     Heart disease Family     Diabetes type II Family        Past Medical History:   Diagnosis Date    Aortic valve regurgitation 01/26/2017    Arthritis     Cancer ([facility] [address] )     Coronary artery disease     Coronary artery disease with angina pectoris ([facility] [address] ) 3/19/2019    Edema 12/02/2016    GERD (gastroesophageal reflux disease)     Heart disease     Heart valve disorder     Heart valve replaced     History of basal cell cancer     HL (hearing loss)     Wears hearing aids    Hx of tissue graft 03/29/2017    Hyperlipemia 01/26/2017    Hypertension     Non-rheumatic mitral regurgitation 10/21/2016    Obesity     Right bundle branch block (RBBB), 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       Past Surgical History:   Procedure Laterality Date    ADENOIDECTOMY      APPENDECTOMY      CARDIAC SURGERY      CARDIAC VALVE REPLACEMENT      EYE SURGERY      HERNIA REPAIR      TONSILLECTOMY      VASECTOMY         Social History     Socioeconomic History    Marital status: /Civil Union     Spouse name: Not on file    Number of children: 3    Years of education: Not on file    Highest education level: Not on file   Occupational History    Occupation:  retired [employer]   Tobacco Use    Smoking status: Former Smoker     Packs/day: 0 50     Types: Cigarettes, Cigars    Smokeless tobacco: Former User    Tobacco comment: quit 10 years ago, NEVER A SMOKER AS PER [employer]GEN   Vaping Use    Vaping Use: Never used   Substance and Sexual Activity    Alcohol use: No    Drug use: No    Sexual activity: Not Currently     Partners: Female   Other Topics Concern    Not on file   Social History Narrative    PT states he does not drink any caffeine      Social Determinants of Health     Financial Resource Strain: Not on file   Food Insecurity: Not on file   Transportation Needs: Not on file   Physical Activity: Not on file   Stress: Not on file   Social Connections: Not on file   Intimate Partner Violence: Not on file   Housing Stability: Not on file       Allergies   Allergen Reactions    Orphenadrine GI Intolerance       BMI Counseling: Body mass index is 31 66 kg/m²  The BMI is above normal  Nutrition recommendations include decreasing portion sizes, moderation in carbohydrate intake, increasing intake of lean protein, reducing intake of saturated and trans fat and reducing intake of cholesterol  No pharmacotherapy was ordered  Patient referred to PCP  Rationale for BMI follow-up plan is due to patient being overweight or obese  BMI Counseling: Body mass index is 31 66 kg/m²  Follow-up plan was not completed due to elderly patient (73 years old) where weight reduction/weight gain would complicate underlying health condition such as: illness or physical disability  Depression Screening and Follow-up Plan: Patient's depression screening was positive with a PHQ-2 score of 4  Their PHQ-9 score was 7           Objective:    /60 (BP Location: Left arm, Patient Position: Sitting, Cuff Size: Standard)   Pulse 78   Temp (!) 97 4 °F (36 3 °C) (Tympanic)   Resp 16   Ht 5' 9" (1 753 m)   Wt 97 3 kg (214 lb 6 4 oz)   SpO2 98%   BMI 31 66 kg/m²        Physical Exam  Constitutional:       General: He is not in acute distress  Appearance: He is well-developed  HENT:      Head: Normocephalic and atraumatic  Nose: Nose normal       Mouth/Throat:      Pharynx: No oropharyngeal exudate  Eyes:      General: No scleral icterus  Conjunctiva/sclera: Conjunctivae normal       Pupils: Pupils are equal, round, and reactive to light  Neck:      Thyroid: No thyromegaly  Vascular: No JVD  Trachea: No tracheal deviation  Cardiovascular:      Rate and Rhythm: Normal rate and regular rhythm  Heart sounds: Murmur heard  No friction rub  No gallop  Pulmonary:      Effort: Pulmonary effort is normal  No respiratory distress  Breath sounds: No wheezing or rales  Chest:      Chest wall: No tenderness  Abdominal:      General: Bowel sounds are normal  There is no distension  Palpations: Abdomen is soft  There is no mass  Tenderness: There is no abdominal tenderness  There is no guarding or rebound  Musculoskeletal:         General: No deformity  Normal range of motion  Cervical back: Normal range of motion and neck supple  Lymphadenopathy:      Cervical: No cervical adenopathy  Skin:     General: Skin is warm and dry  Findings: No rash  Neurological:      Mental Status: He is alert and oriented to person, place, and time  Cranial Nerves: No cranial nerve deficit  Coordination: Coordination normal    Psychiatric:         Behavior: Behavior normal          Thought Content:  Thought content normal          Judgment: Judgment normal

## 2022-07-08 LAB
LEFT EYE DIABETIC RETINOPATHY: NORMAL
RIGHT EYE DIABETIC RETINOPATHY: NORMAL

## 2022-07-10 DIAGNOSIS — E11.9 TYPE 2 DIABETES MELLITUS WITHOUT COMPLICATION, WITHOUT LONG-TERM CURRENT USE OF INSULIN (HCC): ICD-10-CM

## 2022-07-10 DIAGNOSIS — I38 CHRONIC DIASTOLIC HEART FAILURE DUE TO VALVULAR DISEASE (HCC): ICD-10-CM

## 2022-07-10 DIAGNOSIS — I11.0 HYPERTENSIVE HEART DISEASE WITH CONGESTIVE HEART FAILURE, UNSPECIFIED HEART FAILURE TYPE (HCC): ICD-10-CM

## 2022-07-10 DIAGNOSIS — E78.5 HYPERLIPIDEMIA, UNSPECIFIED HYPERLIPIDEMIA TYPE: ICD-10-CM

## 2022-07-10 DIAGNOSIS — I10 HYPERTENSION, UNSPECIFIED TYPE: ICD-10-CM

## 2022-07-10 DIAGNOSIS — I50.32 CHRONIC DIASTOLIC HEART FAILURE DUE TO VALVULAR DISEASE (HCC): ICD-10-CM

## 2022-07-11 RX ORDER — ATORVASTATIN CALCIUM 20 MG/1
TABLET, FILM COATED ORAL
Qty: 90 TABLET | Refills: 1 | Status: SHIPPED | OUTPATIENT
Start: 2022-07-11 | End: 2022-10-27 | Stop reason: SDUPTHER

## 2022-07-18 DIAGNOSIS — I38 CHRONIC DIASTOLIC HEART FAILURE DUE TO VALVULAR DISEASE (HCC): ICD-10-CM

## 2022-07-18 DIAGNOSIS — E11.9 TYPE 2 DIABETES MELLITUS WITHOUT COMPLICATION, WITHOUT LONG-TERM CURRENT USE OF INSULIN (HCC): ICD-10-CM

## 2022-07-18 DIAGNOSIS — E78.5 HYPERLIPIDEMIA, UNSPECIFIED HYPERLIPIDEMIA TYPE: ICD-10-CM

## 2022-07-18 DIAGNOSIS — I10 HYPERTENSION, UNSPECIFIED TYPE: ICD-10-CM

## 2022-07-18 DIAGNOSIS — I50.32 CHRONIC DIASTOLIC HEART FAILURE DUE TO VALVULAR DISEASE (HCC): ICD-10-CM

## 2022-07-18 DIAGNOSIS — I11.0 HYPERTENSIVE HEART DISEASE WITH CONGESTIVE HEART FAILURE, UNSPECIFIED HEART FAILURE TYPE (HCC): ICD-10-CM

## 2022-07-18 RX ORDER — AMLODIPINE BESYLATE 2.5 MG/1
2.5 TABLET ORAL DAILY
Qty: 90 TABLET | Refills: 3 | Status: SHIPPED | OUTPATIENT
Start: 2022-07-18

## 2022-08-18 ENCOUNTER — CONSULT (OUTPATIENT)
Dept: NEUROLOGY | Facility: CLINIC | Age: 87
End: 2022-08-18
Payer: MEDICARE

## 2022-08-18 VITALS — DIASTOLIC BLOOD PRESSURE: 58 MMHG | SYSTOLIC BLOOD PRESSURE: 124 MMHG | HEART RATE: 74 BPM

## 2022-08-18 DIAGNOSIS — G20 PARKINSONISM (HCC): Primary | ICD-10-CM

## 2022-08-18 DIAGNOSIS — G21.4 VASCULAR PARKINSONISM (HCC): ICD-10-CM

## 2022-08-18 PROCEDURE — 99205 OFFICE O/P NEW HI 60 MIN: CPT | Performed by: PSYCHIATRY & NEUROLOGY

## 2022-08-18 RX ORDER — MULTIVIT-MIN/IRON FUM/FOLIC AC 7.5 MG-4
1 TABLET ORAL DAILY
COMMUNITY

## 2022-08-18 NOTE — PATIENT INSTRUCTIONS
Mixed postural and action tremors along with bradykinesia which is slightly assymmetric  Signs of underlying cognitive decline as well  Differential includes parkinsonism  Will try a trial of carbidopa/levodopa 25/100  Week 1: 1/2 tab at 8am and 5pm   Week 2: 1/2 tab at 8am, 12pm and 5pm   Week 3: 1 tab at 8am, 1/2 tab at 12pm and 5pm   Week 4: 1 tab at 8am, 1/2 tab at 12pm and 1 tab at 5pm  Week 5 and on 1 tab at 8am, 12pm and 5pm       Contact the office if you develop side effect

## 2022-08-18 NOTE — PROGRESS NOTES
Patient ID: Allie Perez is a 81 y o  male  Assessment/Plan:    Vascular parkinsonism (HCC)  Mixed postural and action tremors along with bradykinesia which is slightly assymmetric  Signs of underlying cognitive decline as well  Differential includes parkinsonism (PD  with PDD, vascular parkinsonim(given microvascular disease), versus LBD (no classic hallucinations/ fluctuations) versus dementia with bradykinesia more related to normal aging  We discussed the clinical diagnosis of PD  The role of ASHLEY scan was also discussed  At this point I do think he would benefit from a trial of levodopa to determine if there is an underlying responsive dopamine deficiency Given sensitivity to medications , we will do a slow titration  Will try a trial of carbidopa/levodopa 25/100  Week 1: 1/2 tab at 8am and 5pm   Week 2: 1/2 tab at 8am, 12pm and 5pm   Week 3: 1 tab at 8am, 1/2 tab at 12pm and 5pm   Week 4: 1 tab at 8am, 1/2 tab at 12pm and 1 tab at 5pm  Week 5 and on 1 tab at 8am, 12pm and 5pm       Instructed to contact the office if he develops side effect  Diagnoses and all orders for this visit:    Parkinsonism ([facility] )  -     carbidopa-levodopa (SINEMET)  mg per tablet; Take 1 tablet by mouth 3 (three) times a day    Vascular parkinsonism [facility])  Comments:  Needs neurology movement specialist consultation  Orders:  -     Ambulatory Referral to Neurology    Other orders  -     Multiple Vitamins-Minerals (multivitamin with minerals) tablet; Take 1 tablet by mouth daily  -     TURMERIC PO; Take by mouth  -     Pomegranate, Punica granatum, (POMEGRANATE PO); Take by mouth       Spent 60 minutes on patient visit, counseling and partial documentation     Subjective:    81year old man with CAD, PPM Type2 DM who presents for movement disorder evaluation for possible Parkinson's disease  He was seen in ED and noted to have parkinsonian features and therefore referred     He has bilateral R>L rest tremor for the last 6 months  He has difficulty gripping utensils  It can interfere with eating  Handwriting is unchanged  Speech is soft  There is no drooling  No difficulty chewing and swallowing  No difficulty dressing (other than buttons) and hygiene acts  Handwriting is unchanged  There is no difficulty arising out of chair  He ambulated with walker recently to help with stability  He has neck pain which is worse when lying down  Sleeps well  There is no daytime sedation  No clear RBD symptoms such as thrashing or yelling  HE is more easily cold  There are no issues with urination  He has constipation for which he uses Senna  Cognition has changed since his pacemaker placement June 2021  He has trouble remembering recent events  Finances are managed by his wife  He plays solTaomeerie on the computer and crossword at times  He is not very active in the home  No hallucinations  There are no changes in olfaction  CT head 5/2022-  Volume loss and white matter disease is present  There is no acute   infarction or hemorrhage              Objective:    Blood pressure 124/58, pulse 74  Physical Exam  Vitals reviewed  Eyes:      Extraocular Movements: Extraocular movements intact  Pupils: Pupils are equal, round, and reactive to light  Neurological:      Mental Status: He is alert  Deep Tendon Reflexes: Strength normal          Neurological Exam  Mental Status  Alert  Oriented only to person and place  Orientation: lnows year, not date , day of week , month  Speech: hypophonia  Language is fluent with no aphasia  Attention and concentration are normal     Cranial Nerves  CN II: Visual fields full to confrontation  CN III, IV, VI: Extraocular movements intact bilaterally  Pupils equal round and reactive to light bilaterally  CN V: Facial sensation is normal   CN VII: Full and symmetric facial movement    CN VIII: Hearing is normal   CN IX, X: Palate elevates symmetrically  CN XI: Shoulder shrug strength is normal   CN XII: Tongue midline without atrophy or fasciculations  Motor   Normal muscle tone  Strength is 5/5 throughout all four extremities  Sensory  Light touch is normal in upper and lower extremities  Coordination  Right: Finger-to-nose normal  Rapid alternating movement abnormality:Left: Finger-to-nose normal  Rapid alternating movement abnormality:  See MDS UPDRS III  Gait  Casual gait: Normal pull test  Able to rise from chair without using arms  MDS UPDRS III                                       Time since last dose:    Speech  0   Facial Expression  0   Rigidity - Neck  3   Rigidity - Upper Extremity (R)  0   Rigidity - Upper Extremity (L)   1   Rigidity - Lower Extremity (R)  0   Rigidity - Lower Extremity (L)   0   Finger Taps (R)   1   Finger Taps (L)   1   Hand Movement (R)  1   Hand Movement (L)   1   Pronation/Supination (R)  0   Pronation/Supination (L)   0   Toe Tapping (R) 0   Toe Tapping (L) 1   Leg Agility (R)  0   Leg Agility (L)   0   Arising from Chair   0   Gait   1   Freezing of Gait 0   Postural Stability   0   Posture 0   Global spontaneity of movement 0   Postural Tremor (Ri 0   Postural Tremor (L) 1   Kinetic Tremor (R)  1   Kinetic Tremor (L)  2   Rest tremor amplitude RUE 0   Rest tremor amplitude LUE 0   Rest tremor amplitude RLE 0   Reset tremor amplitude LLE 0   Lip/Jaw Tremor  0   Consistency of tremor 0   Motor Exam Total:            ROS:    Review of Systems   Constitutional: Positive for fatigue  Negative for appetite change and fever  HENT: Negative  Negative for hearing loss, tinnitus, trouble swallowing and voice change  Eyes: Negative  Negative for photophobia and pain  Respiratory: Negative  Negative for shortness of breath  Cardiovascular: Negative  Negative for palpitations  Gastrointestinal: Negative  Negative for nausea and vomiting  Endocrine: Negative  Negative for cold intolerance     Genitourinary: Negative  Negative for dysuria, frequency and urgency  Musculoskeletal: Positive for gait problem (Uses walker) and neck pain (ongoing for a year)  Negative for myalgias  Balance Issues  Keeps moving hands as if they are getting stuck     Skin: Negative  Negative for rash  Allergic/Immunologic: Negative  Neurological: Positive for weakness ( strength) and light-headedness (Sometimes)  Negative for dizziness, tremors, seizures, syncope, facial asymmetry, speech difficulty, numbness and headaches  Hematological: Bruises/bleeds easily (Bleed)  Psychiatric/Behavioral: Negative  Negative for confusion, hallucinations and sleep disturbance

## 2022-08-19 NOTE — ASSESSMENT & PLAN NOTE
Mixed postural and action tremors along with bradykinesia which is slightly assymmetric  Signs of underlying cognitive decline as well  Differential includes parkinsonism (PD  with PDD, vascular parkinsonim(given microvascular disease), versus LBD (no classic hallucinations/ fluctuations) versus dementia with bradykinesia more related to normal aging  We discussed the clinical diagnosis of PD  The role of ASHLEY scan was also discussed  At this point I do think he would benefit from a trial of levodopa to determine if there is an underlying responsive dopamine deficiency Given sensitivity to medications , we will do a slow titration  Will try a trial of carbidopa/levodopa 25/100  Week 1: 1/2 tab at 8am and 5pm   Week 2: 1/2 tab at 8am, 12pm and 5pm   Week 3: 1 tab at 8am, 1/2 tab at 12pm and 5pm   Week 4: 1 tab at 8am, 1/2 tab at 12pm and 1 tab at 5pm  Week 5 and on 1 tab at 8am, 12pm and 5pm       Instructed to contact the office if he develops side effect

## 2022-08-26 ENCOUNTER — RA CDI HCC (OUTPATIENT)
Dept: OTHER | Facility: HOSPITAL | Age: 87
End: 2022-08-26

## 2022-08-26 NOTE — PROGRESS NOTES
[facility]  coding opportunities     I11 0     Chart Reviewed number of suggestions sent to Provider: 1     Patients Insurance     Medicare Insurance: [employer]

## 2022-08-29 ENCOUNTER — APPOINTMENT (OUTPATIENT)
Dept: LAB | Age: 87
End: 2022-08-29
Payer: MEDICARE

## 2022-08-29 DIAGNOSIS — K59.01 SLOW TRANSIT CONSTIPATION: ICD-10-CM

## 2022-08-29 DIAGNOSIS — E87.1 HYPONATREMIA WITH EXCESS EXTRACELLULAR FLUID VOLUME: ICD-10-CM

## 2022-08-29 DIAGNOSIS — E11.42 DM TYPE 2 WITH DIABETIC PERIPHERAL NEUROPATHY (HCC): ICD-10-CM

## 2022-08-29 DIAGNOSIS — E11.9 TYPE 2 DIABETES MELLITUS WITHOUT COMPLICATION, WITHOUT LONG-TERM CURRENT USE OF INSULIN (HCC): ICD-10-CM

## 2022-08-29 DIAGNOSIS — R60.9 EDEMA, UNSPECIFIED TYPE: ICD-10-CM

## 2022-08-29 LAB
ALBUMIN SERPL BCP-MCNC: 3.1 G/DL (ref 3.5–5)
ALP SERPL-CCNC: 87 U/L (ref 46–116)
ALT SERPL W P-5'-P-CCNC: 37 U/L (ref 12–78)
ANION GAP SERPL CALCULATED.3IONS-SCNC: 7 MMOL/L (ref 4–13)
AST SERPL W P-5'-P-CCNC: 28 U/L (ref 5–45)
BASOPHILS # BLD AUTO: 0.05 THOUSANDS/ΜL (ref 0–0.1)
BASOPHILS NFR BLD AUTO: 1 % (ref 0–1)
BILIRUB SERPL-MCNC: 0.53 MG/DL (ref 0.2–1)
BUN SERPL-MCNC: 19 MG/DL (ref 5–25)
CALCIUM ALBUM COR SERPL-MCNC: 9.4 MG/DL (ref 8.3–10.1)
CALCIUM SERPL-MCNC: 8.7 MG/DL (ref 8.3–10.1)
CHLORIDE SERPL-SCNC: 104 MMOL/L (ref 96–108)
CO2 SERPL-SCNC: 26 MMOL/L (ref 21–32)
CREAT SERPL-MCNC: 1.26 MG/DL (ref 0.6–1.3)
EOSINOPHIL # BLD AUTO: 0.36 THOUSAND/ΜL (ref 0–0.61)
EOSINOPHIL NFR BLD AUTO: 4 % (ref 0–6)
ERYTHROCYTE [DISTWIDTH] IN BLOOD BY AUTOMATED COUNT: 13 % (ref 11.6–15.1)
EST. AVERAGE GLUCOSE BLD GHB EST-MCNC: 140 MG/DL
GFR SERPL CREATININE-BSD FRML MDRD: 51 ML/MIN/1.73SQ M
GLUCOSE P FAST SERPL-MCNC: 123 MG/DL (ref 65–99)
HBA1C MFR BLD: 6.5 %
HCT VFR BLD AUTO: 37.1 % (ref 36.5–49.3)
HGB BLD-MCNC: 12.1 G/DL (ref 12–17)
IMM GRANULOCYTES # BLD AUTO: 0.04 THOUSAND/UL (ref 0–0.2)
IMM GRANULOCYTES NFR BLD AUTO: 0 % (ref 0–2)
LYMPHOCYTES # BLD AUTO: 1.7 THOUSANDS/ΜL (ref 0.6–4.47)
LYMPHOCYTES NFR BLD AUTO: 19 % (ref 14–44)
MCH RBC QN AUTO: 32.6 PG (ref 26.8–34.3)
MCHC RBC AUTO-ENTMCNC: 32.6 G/DL (ref 31.4–37.4)
MCV RBC AUTO: 100 FL (ref 82–98)
MONOCYTES # BLD AUTO: 0.76 THOUSAND/ΜL (ref 0.17–1.22)
MONOCYTES NFR BLD AUTO: 9 % (ref 4–12)
NEUTROPHILS # BLD AUTO: 6.02 THOUSANDS/ΜL (ref 1.85–7.62)
NEUTS SEG NFR BLD AUTO: 67 % (ref 43–75)
NRBC BLD AUTO-RTO: 0 /100 WBCS
PLATELET # BLD AUTO: 154 THOUSANDS/UL (ref 149–390)
PMV BLD AUTO: 10 FL (ref 8.9–12.7)
POTASSIUM SERPL-SCNC: 4.4 MMOL/L (ref 3.5–5.3)
PROT SERPL-MCNC: 6.8 G/DL (ref 6.4–8.4)
RBC # BLD AUTO: 3.71 MILLION/UL (ref 3.88–5.62)
SODIUM SERPL-SCNC: 137 MMOL/L (ref 135–147)
WBC # BLD AUTO: 8.93 THOUSAND/UL (ref 4.31–10.16)

## 2022-08-29 PROCEDURE — 36415 COLL VENOUS BLD VENIPUNCTURE: CPT

## 2022-08-29 PROCEDURE — 83036 HEMOGLOBIN GLYCOSYLATED A1C: CPT

## 2022-08-29 PROCEDURE — 80053 COMPREHEN METABOLIC PANEL: CPT

## 2022-08-29 PROCEDURE — 85025 COMPLETE CBC W/AUTO DIFF WBC: CPT

## 2022-08-30 ENCOUNTER — TELEPHONE (OUTPATIENT)
Dept: NEUROLOGY | Facility: CLINIC | Age: 87
End: 2022-08-30

## 2022-08-30 NOTE — TELEPHONE ENCOUNTER
Pt's wife Kelly Lujan calling to report that pt is more sluggish than ever and would like to get him off the medication Sinemet  She states that pt is very lethargic, much more that he has ever been as he does already have problems with tiredness but more prominent ever since increasing dose of sinemet to TID  Pt's wife says pt started 1/2 tab of Sinemet BID from 8/19-8/25  Then they increased it to TID on 8/26 and ever since then, pt has gotten worse  She says between her and her son and pt, they all agreed to try Sinemet but now have changed their minds  She states that she did not give him this morning's dose b/c she could detect agitation in him  She says pt doesn't really have PD but has some of the "isms" of PD  She says the medication is not doing him any good  She is requesting pt come off of Sinemet and at 81 yrs old, let him progress with life as is  Wife asking how they should go about weaning him off of medication  Of note, wife states pt will be seeing Dr Justo Catalan this Friday  Dr Restrepo [place] - Please advise  Kal  [phone], ok to leave detailed message

## 2022-09-01 ENCOUNTER — OFFICE VISIT (OUTPATIENT)
Dept: FAMILY MEDICINE CLINIC | Facility: CLINIC | Age: 87
End: 2022-09-01
Payer: MEDICARE

## 2022-09-01 ENCOUNTER — TELEPHONE (OUTPATIENT)
Dept: FAMILY MEDICINE CLINIC | Facility: CLINIC | Age: 87
End: 2022-09-01

## 2022-09-01 VITALS
HEIGHT: 69 IN | TEMPERATURE: 97.4 F | HEART RATE: 74 BPM | SYSTOLIC BLOOD PRESSURE: 128 MMHG | BODY MASS INDEX: 32.11 KG/M2 | WEIGHT: 216.8 LBS | DIASTOLIC BLOOD PRESSURE: 60 MMHG | OXYGEN SATURATION: 97 %

## 2022-09-01 DIAGNOSIS — F33.9 DEPRESSION, RECURRENT (HCC): Primary | ICD-10-CM

## 2022-09-01 DIAGNOSIS — N18.31 STAGE 3A CHRONIC KIDNEY DISEASE (HCC): ICD-10-CM

## 2022-09-01 DIAGNOSIS — I11.0 HYPERTENSIVE HEART DISEASE WITH CONGESTIVE HEART FAILURE, UNSPECIFIED HEART FAILURE TYPE (HCC): ICD-10-CM

## 2022-09-01 DIAGNOSIS — I38 CHRONIC DIASTOLIC HEART FAILURE DUE TO VALVULAR DISEASE (HCC): ICD-10-CM

## 2022-09-01 DIAGNOSIS — I50.32 CHRONIC DIASTOLIC HEART FAILURE DUE TO VALVULAR DISEASE (HCC): ICD-10-CM

## 2022-09-01 DIAGNOSIS — K21.00 GERD WITH ESOPHAGITIS: ICD-10-CM

## 2022-09-01 DIAGNOSIS — E78.5 HYPERLIPIDEMIA, UNSPECIFIED HYPERLIPIDEMIA TYPE: ICD-10-CM

## 2022-09-01 DIAGNOSIS — I73.9 PERIPHERAL ARTERY INSUFFICIENCY (HCC): ICD-10-CM

## 2022-09-01 DIAGNOSIS — E11.9 TYPE 2 DIABETES MELLITUS WITHOUT COMPLICATION, WITHOUT LONG-TERM CURRENT USE OF INSULIN (HCC): ICD-10-CM

## 2022-09-01 DIAGNOSIS — I10 HYPERTENSION, UNSPECIFIED TYPE: ICD-10-CM

## 2022-09-01 DIAGNOSIS — I48.0 PAROXYSMAL ATRIAL FIBRILLATION (HCC): ICD-10-CM

## 2022-09-01 LAB — SL AMB POCT HEMOGLOBIN AIC: 6.4 (ref ?–6.5)

## 2022-09-01 PROCEDURE — 83036 HEMOGLOBIN GLYCOSYLATED A1C: CPT | Performed by: INTERNAL MEDICINE

## 2022-09-01 PROCEDURE — 99214 OFFICE O/P EST MOD 30 MIN: CPT | Performed by: INTERNAL MEDICINE

## 2022-09-01 RX ORDER — BUMETANIDE 1 MG/1
1 TABLET ORAL DAILY
Qty: 90 TABLET | Refills: 2 | Status: SHIPPED | OUTPATIENT
Start: 2022-09-01

## 2022-09-01 RX ORDER — DOXAZOSIN MESYLATE 4 MG/1
4 TABLET ORAL
Qty: 90 TABLET | Refills: 3 | Status: SHIPPED | OUTPATIENT
Start: 2022-09-01 | End: 2022-10-27 | Stop reason: SDUPTHER

## 2022-09-01 NOTE — PROGRESS NOTES
Assessment/Plan: This 60-year-old male was recently evaluated by Neurology and found to have parkinsonism and given a trial of low-dose Sinemet  Unfortunately this depressed his CNS status to the point where he could not finish breakfast he became so drowsy and the Sinemet was discontinued with improvement  The patient and his wife feel that he would do better without medication at this point  He continues to demonstrate bradykinesia cogwheeling as previous  Diet reviewed  Lifestyle modifications reviewed  Medications reviewed and ordered  Laboratory tests and studies reviewed and ordered  All patient's questions answered to patient satisfaction  Diagnoses and all orders for this visit:    Depression, recurrent ([facility] )    Hypertension, unspecified type  -     bumetanide (BUMEX) 1 mg tablet; Take 1 tablet (1 mg total) by mouth daily  -     doxazosin (CARDURA) 4 mg tablet; Take 1 tablet (4 mg total) by mouth daily at bedtime    Chronic diastolic heart failure due to valvular disease (HCC)  -     bumetanide (BUMEX) 1 mg tablet; Take 1 tablet (1 mg total) by mouth daily  -     doxazosin (CARDURA) 4 mg tablet; Take 1 tablet (4 mg total) by mouth daily at bedtime    Hypertensive heart disease with congestive heart failure, unspecified heart failure type (HCC)  -     bumetanide (BUMEX) 1 mg tablet; Take 1 tablet (1 mg total) by mouth daily    Type 2 diabetes mellitus without complication, without long-term current use of insulin (HCC)  -     bumetanide (BUMEX) 1 mg tablet; Take 1 tablet (1 mg total) by mouth daily  -     POCT hemoglobin A1c    Hyperlipidemia, unspecified hyperlipidemia type  -     bumetanide (BUMEX) 1 mg tablet; Take 1 tablet (1 mg total) by mouth daily    GERD with esophagitis  -     doxazosin (CARDURA) 4 mg tablet;  Take 1 tablet (4 mg total) by mouth daily at bedtime    Paroxysmal atrial fibrillation (HCC)    Stage 3a chronic kidney disease ([facility] )    Peripheral artery insufficiency [facility])        Chief Complaint   Patient presents with    Follow-up     2 month     Results     Labs 8/29/2022    Medication Refill         Subjective: This 79-year-old male was recently evaluated by Neurology and found to have parkinsonism and given a trial of low-dose Sinemet  Unfortunately this depressed his CNS status to the point where he could not finish breakfast he became so drowsy and the Sinemet was discontinued with improvement  The patient and his wife feel that he would do better without medication at this point  He continues to demonstrate bradykinesia cogwheeling as previous  Medication Refill  Pertinent negatives include no abdominal pain, arthralgias, chest pain, coughing, fatigue, fever, headaches, joint swelling, nausea, neck pain, numbness, rash, sore throat, vomiting or weakness  Patient ID: Leela Isbell is a 81 y o  male          Current Outpatient Medications:     allopurinol (ZYLOPRIM) 100 mg tablet, Take 1 tablet (100 mg total) by mouth daily, Disp: 90 tablet, Rfl: 3    amLODIPine (NORVASC) 2 5 mg tablet, Take 1 tablet (2 5 mg total) by mouth daily, Disp: 90 tablet, Rfl: 3    ascorbic acid (VITAMIN C) 500 MG tablet, Take 500 mg by mouth daily, Disp: , Rfl:     atorvastatin (LIPITOR) 20 mg tablet, TAKE 1 TABLET BY MOUTH EVERY DAY, Disp: 90 tablet, Rfl: 1    BABY ASPIRIN PO, Take 81 mg by mouth daily, Disp: , Rfl:     Blood Glucose Monitoring Suppl (ONE TOUCH ULTRA 2) w/Device KIT, by Does not apply route daily To test blood sugar 1x daily DX:diabetes, Disp: 1 each, Rfl: 0    bumetanide (BUMEX) 1 mg tablet, Take 1 tablet (1 mg total) by mouth daily, Disp: 90 tablet, Rfl: 2    doxazosin (CARDURA) 4 mg tablet, Take 1 tablet (4 mg total) by mouth daily at bedtime, Disp: 90 tablet, Rfl: 3    Eliquis 5 MG, Take 5 mg by mouth 2 (two) times a day, Disp: , Rfl:     glucose blood test strip, OneTouch Ultra Test strips, Disp: , Rfl:     hydrOXYzine HCL (ATARAX) 10 mg tablet, TAKE 1 TABLET BY MOUTH THREE TIMES DAILY   MAY CAUSE DROWSINESS, Disp: , Rfl:     Lancets (OneTouch Delica Plus [id number]) MISC, USE TO TEST BLOOD SUGARS ONCE DAILY, Disp: 100 each, Rfl: 6    metFORMIN (GLUCOPHAGE-XR) 500 mg 24 hr tablet, Take 1 tablet (500 mg total) by mouth daily with dinner, Disp: 90 tablet, Rfl: 3    Multiple Vitamins-Minerals (multivitamin with minerals) tablet, Take 1 tablet by mouth daily, Disp: , Rfl:     nitroglycerin (NITROSTAT) 0 4 mg SL tablet, Place 1 tablet (0 4 mg total) under the tongue every 5 (five) minutes as needed for chest pain, Disp: 90 tablet, Rfl: 1    OneTouch Ultra test strip, USE TO TEST BLOOD SUGAR ONCE DAILY, Disp: 100 strip, Rfl: 6    pantoprazole (PROTONIX) 40 mg tablet, Take 1 tablet (40 mg total) by mouth daily at bedtime, Disp: 90 tablet, Rfl: 1    Pomegranate, Punica granatum, (POMEGRANATE PO), Take by mouth, Disp: , Rfl:     telmisartan (MICARDIS) 40 mg tablet, Take 1 tablet (40 mg total) by mouth daily, Disp: 90 tablet, Rfl: 3    TURMERIC PO, Take by mouth, Disp: , Rfl:     Zoster Vac Recomb Adjuvanted 50 MCG/0 5ML SUSR, Shingrix (PF) 50 mcg/0 5 mL intramuscular suspension, kit, Disp: , Rfl:     clotrimazole-betamethasone (LOTRISONE) 1-0 05 % cream, Apply topically 2 (two) times a day (Patient not taking: No sig reported), Disp: 45 g, Rfl: 1    fluticasone (FLONASE) 50 mcg/act nasal spray, 1 spray into each nostril 2 (two) times a day (Patient not taking: No sig reported), Disp: 48 g, Rfl: 3    ipratropium (ATROVENT) 0 03 % nasal spray, 1 spray into each nostril 3 (three) times a day (Patient not taking: Reported on 7/29/2021), Disp: 30 mL, Rfl: 11    ketoconazole (NIZORAL) 2 % cream, , Disp: , Rfl:     miconazole (MICOTIN) 2 % powder, Apply topically as needed for itching (Patient not taking: No sig reported), Disp: 70 g, Rfl: 5    mometasone (ELOCON) 0 1 % lotion, One drop affected ear canal daily at bedtime when necessary itching (Patient not taking: No sig reported), Disp: 30 mL, Rfl: 0    nystatin (MYCOSTATIN) cream, Apply topically 2 (two) times a day (Patient not taking: No sig reported), Disp: 150 g, Rfl: 2    nystatin (MYCOSTATIN) powder, Apply topically 3 (three) times a day (Patient not taking: No sig reported), Disp: 60 g, Rfl: 1    pimecrolimus (ELIDEL) 1 % cream, , Disp: , Rfl:     triamcinolone (KENALOG) 0 1 % cream, , Disp: , Rfl:     The following portions of the patient's history were reviewed and updated as appropriate: allergies, current medications, past family history, past medical history, past social history, past surgical history and problem list     Review of Systems   Constitutional: Negative for appetite change, fatigue, fever and unexpected weight change  HENT: Negative for rhinorrhea, sinus pressure, sinus pain, sneezing and sore throat  Eyes: Negative for visual disturbance  Respiratory: Negative for cough, chest tightness, shortness of breath and wheezing  Cardiovascular: Negative for chest pain, palpitations and leg swelling  Gastrointestinal: Negative for abdominal distention, abdominal pain, blood in stool, constipation, diarrhea, nausea and vomiting  Endocrine: Negative for polydipsia and polyuria  Genitourinary: Negative for decreased urine volume, difficulty urinating, dysuria, hematuria and urgency  Musculoskeletal: Negative for arthralgias, back pain, joint swelling and neck pain  Skin: Negative for rash  Allergic/Immunologic: Negative for environmental allergies  Neurological: Negative for tremors, weakness, light-headedness, numbness and headaches  Hematological: Does not bruise/bleed easily  Psychiatric/Behavioral: Negative for agitation, behavioral problems, confusion and dysphoric mood  The patient is not nervous/anxious            Family History   Problem Relation Age of Onset    Diabetes Mother     Alcohol abuse Brother     Bipolar disorder Brother    [place] Abt Hypertension Brother     Kidney disease Brother     Heart disease Family     Diabetes type II Family        Past Medical History:   Diagnosis Date    Aortic valve regurgitation 01/26/2017    Arthritis     [facility] )     Coronary artery disease     Coronary artery disease with angina pectoris [facility] ) 3/19/2019    Edema 12/02/2016    GERD (gastroesophageal reflux disease)     Heart disease     Heart valve disorder     Heart valve replaced     History of basal cell cancer     HL (hearing loss)     Wears hearing aids    Hx of tissue graft 03/29/2017    Hyperlipemia 01/26/2017    Hypertension     Non-rheumatic mitral regurgitation 10/21/2016    Obesity     Right bundle branch block (RBBB), 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       Past Surgical History:   Procedure Laterality Date    ADENOIDECTOMY      APPENDECTOMY      CARDIAC SURGERY      CARDIAC VALVE REPLACEMENT      EYE SURGERY      HERNIA REPAIR      TONSILLECTOMY      VASECTOMY         Social History     Socioeconomic History    Marital status: /Civil Union     Spouse name: None    Number of children: 3    Years of education: None    Highest education level: None   Occupational History    Occupation:  retired [employer]   Tobacco Use    Smoking status: Former Smoker     Packs/day: 0 50     Types: Cigarettes, Cigars    Smokeless tobacco: Former User    Tobacco comment: quit 10 years ago, NEVER A SMOKER AS PER [employer]   Vaping Use    Vaping Use: Never used   Substance and Sexual Activity    Alcohol use: No    Drug use: No    Sexual activity: Not Currently     Partners: Female   Other Topics Concern    None   Social History Narrative    PT states he does not drink any caffeine      Social Determinants of Health     Financial Resource Strain: Not on file   Food Insecurity: Not on file   Transportation Needs: Not on file   Physical Activity: Not on file   Stress: Not on file Social Connections: Not on file   Intimate Partner Violence: Not on file   Housing Stability: Not on file       Allergies   Allergen Reactions    Orphenadrine GI Intolerance         Objective:    /60 (BP Location: Right arm, Patient Position: Sitting, Cuff Size: Large)   Pulse 74   Temp (!) 97 4 °F (36 3 °C) (Tympanic)   Ht 5' 9" (1 753 m)   Wt 98 3 kg (216 lb 12 8 oz)   SpO2 97%   BMI 32 02 kg/m²        Physical Exam  Constitutional:       General: He is not in acute distress  Appearance: He is well-developed  HENT:      Head: Normocephalic and atraumatic  Nose: Nose normal       Mouth/Throat:      Pharynx: No oropharyngeal exudate  Eyes:      General: No scleral icterus  Conjunctiva/sclera: Conjunctivae normal       Pupils: Pupils are equal, round, and reactive to light  Neck:      Thyroid: No thyromegaly  Vascular: No JVD  Trachea: No tracheal deviation  Cardiovascular:      Rate and Rhythm: Normal rate and regular rhythm  Heart sounds: Normal heart sounds  No murmur heard  No friction rub  No gallop  Pulmonary:      Effort: Pulmonary effort is normal  No respiratory distress  Breath sounds: No wheezing or rales  Chest:      Chest wall: No tenderness  Abdominal:      General: Bowel sounds are normal  There is no distension  Palpations: Abdomen is soft  There is no mass  Tenderness: There is no abdominal tenderness  There is no guarding or rebound  Musculoskeletal:         General: No deformity  Normal range of motion  Cervical back: Normal range of motion and neck supple  Lymphadenopathy:      Cervical: No cervical adenopathy  Skin:     General: Skin is warm and dry  Findings: No rash  Neurological:      Mental Status: He is alert and oriented to person, place, and time  Cranial Nerves: No cranial nerve deficit        Coordination: Coordination normal       Gait: Gait abnormal    Psychiatric:         Behavior: Behavior normal          Thought Content:  Thought content normal          Judgment: Judgment normal

## 2022-09-11 DIAGNOSIS — K21.00 GERD WITH ESOPHAGITIS: ICD-10-CM

## 2022-09-12 DIAGNOSIS — I10 HYPERTENSION, UNSPECIFIED TYPE: Primary | ICD-10-CM

## 2022-09-12 DIAGNOSIS — K02.9 DENTAL CAVITIES: ICD-10-CM

## 2022-09-12 RX ORDER — AMOXICILLIN 500 MG/1
500 CAPSULE ORAL EVERY 8 HOURS SCHEDULED
Qty: 16 CAPSULE | Refills: 0 | Status: SHIPPED | OUTPATIENT
Start: 2022-09-12 | End: 2022-09-17

## 2022-09-12 RX ORDER — PANTOPRAZOLE SODIUM 40 MG/1
TABLET, DELAYED RELEASE ORAL
Qty: 90 TABLET | Refills: 1 | Status: SHIPPED | OUTPATIENT
Start: 2022-09-12

## 2022-09-12 RX ORDER — HYDROXYZINE HYDROCHLORIDE 10 MG/1
10 TABLET, FILM COATED ORAL DAILY
Qty: 30 TABLET | Refills: 1 | Status: SHIPPED | OUTPATIENT
Start: 2022-09-12

## 2022-10-20 DIAGNOSIS — E11.9 TYPE 2 DIABETES MELLITUS WITHOUT COMPLICATION, WITHOUT LONG-TERM CURRENT USE OF INSULIN (HCC): ICD-10-CM

## 2022-10-21 ENCOUNTER — RA CDI HCC (OUTPATIENT)
Dept: OTHER | Facility: HOSPITAL | Age: 87
End: 2022-10-21

## 2022-10-21 RX ORDER — BLOOD-GLUCOSE METER
EACH MISCELLANEOUS DAILY
Qty: 1 KIT | Refills: 0 | Status: SHIPPED | OUTPATIENT
Start: 2022-10-21

## 2022-10-21 NOTE — PROGRESS NOTES
[facility]  coding opportunities       Chart reviewed, no opportunity found: CHART REVIEWED, NO OPPORTUNITY FOUND        Patients Insurance     Medicare Insurance: Medicare

## 2022-10-27 ENCOUNTER — OFFICE VISIT (OUTPATIENT)
Dept: FAMILY MEDICINE CLINIC | Facility: CLINIC | Age: 87
End: 2022-10-27
Payer: MEDICARE

## 2022-10-27 VITALS
WEIGHT: 215 LBS | BODY MASS INDEX: 31.84 KG/M2 | RESPIRATION RATE: 16 BRPM | SYSTOLIC BLOOD PRESSURE: 126 MMHG | HEART RATE: 74 BPM | OXYGEN SATURATION: 96 % | TEMPERATURE: 98.6 F | DIASTOLIC BLOOD PRESSURE: 60 MMHG | HEIGHT: 69 IN

## 2022-10-27 DIAGNOSIS — I10 HYPERTENSION, UNSPECIFIED TYPE: ICD-10-CM

## 2022-10-27 DIAGNOSIS — K21.00 GERD WITH ESOPHAGITIS: ICD-10-CM

## 2022-10-27 DIAGNOSIS — N18.31 STAGE 3A CHRONIC KIDNEY DISEASE (HCC): ICD-10-CM

## 2022-10-27 DIAGNOSIS — I50.32 CHRONIC DIASTOLIC HEART FAILURE DUE TO VALVULAR DISEASE (HCC): ICD-10-CM

## 2022-10-27 DIAGNOSIS — I50.32 CHRONIC DIASTOLIC CONGESTIVE HEART FAILURE, NYHA CLASS 2 (HCC): ICD-10-CM

## 2022-10-27 DIAGNOSIS — I34.0 NON-RHEUMATIC MITRAL REGURGITATION: ICD-10-CM

## 2022-10-27 DIAGNOSIS — E11.9 TYPE 2 DIABETES MELLITUS WITHOUT COMPLICATION, WITHOUT LONG-TERM CURRENT USE OF INSULIN (HCC): ICD-10-CM

## 2022-10-27 DIAGNOSIS — I38 CHRONIC DIASTOLIC HEART FAILURE DUE TO VALVULAR DISEASE (HCC): ICD-10-CM

## 2022-10-27 DIAGNOSIS — I45.2 RIGHT BUNDLE BRANCH BLOCK WITH LEFT ANTERIOR FASCICULAR BLOCK: ICD-10-CM

## 2022-10-27 DIAGNOSIS — E11.42 DM TYPE 2 WITH DIABETIC PERIPHERAL NEUROPATHY (HCC): ICD-10-CM

## 2022-10-27 DIAGNOSIS — I70.25 ATHEROSCLEROSIS OF NATIVE ARTERIES OF THE EXTREMITIES WITH ULCERATION (HCC): ICD-10-CM

## 2022-10-27 DIAGNOSIS — Z95.4 HISTORY OF HEART VALVE REPLACEMENT WITH TISSUE GRAFT: ICD-10-CM

## 2022-10-27 DIAGNOSIS — E78.5 HYPERLIPIDEMIA, UNSPECIFIED HYPERLIPIDEMIA TYPE: ICD-10-CM

## 2022-10-27 DIAGNOSIS — I10 ESSENTIAL HYPERTENSION: ICD-10-CM

## 2022-10-27 DIAGNOSIS — G21.4 VASCULAR PARKINSONISM (HCC): ICD-10-CM

## 2022-10-27 DIAGNOSIS — T82.897D AORTIC PROSTHETIC VALVE REGURGITATION, SUBSEQUENT ENCOUNTER: ICD-10-CM

## 2022-10-27 DIAGNOSIS — E87.1 HYPONATREMIA WITH EXCESS EXTRACELLULAR FLUID VOLUME: ICD-10-CM

## 2022-10-27 DIAGNOSIS — E11.42 DM TYPE 2 WITH DIABETIC PERIPHERAL NEUROPATHY (HCC): Primary | ICD-10-CM

## 2022-10-27 DIAGNOSIS — I11.0 HYPERTENSIVE HEART DISEASE WITH CONGESTIVE HEART FAILURE, UNSPECIFIED HEART FAILURE TYPE (HCC): ICD-10-CM

## 2022-10-27 DIAGNOSIS — I48.0 PAROXYSMAL ATRIAL FIBRILLATION (HCC): ICD-10-CM

## 2022-10-27 PROCEDURE — 99214 OFFICE O/P EST MOD 30 MIN: CPT | Performed by: INTERNAL MEDICINE

## 2022-10-27 RX ORDER — DOXAZOSIN MESYLATE 4 MG/1
4 TABLET ORAL
Qty: 90 TABLET | Refills: 3 | Status: SHIPPED | OUTPATIENT
Start: 2022-10-27

## 2022-10-27 RX ORDER — ATORVASTATIN CALCIUM 20 MG/1
20 TABLET, FILM COATED ORAL DAILY
Qty: 90 TABLET | Refills: 1 | Status: SHIPPED | OUTPATIENT
Start: 2022-10-27

## 2022-10-27 RX ORDER — BLOOD SUGAR DIAGNOSTIC
1 STRIP MISCELLANEOUS DAILY
Qty: 100 STRIP | Refills: 6 | Status: SHIPPED | OUTPATIENT
Start: 2022-10-27

## 2022-10-27 RX ORDER — ALLOPURINOL 100 MG/1
100 TABLET ORAL DAILY
Qty: 90 TABLET | Refills: 3 | Status: SHIPPED | OUTPATIENT
Start: 2022-10-27

## 2022-10-27 RX ORDER — LANCETS 33 GAUGE
100 EACH MISCELLANEOUS 2 TIMES DAILY
COMMUNITY
End: 2022-10-27 | Stop reason: SDUPTHER

## 2022-10-27 RX ORDER — LANCETS 33 GAUGE
EACH MISCELLANEOUS
Qty: 100 EACH | Refills: 3 | Status: SHIPPED | OUTPATIENT
Start: 2022-10-27 | End: 2022-10-27

## 2022-10-27 RX ORDER — METFORMIN HYDROCHLORIDE 500 MG/1
500 TABLET, EXTENDED RELEASE ORAL
Qty: 90 TABLET | Refills: 3 | Status: SHIPPED | OUTPATIENT
Start: 2022-10-27

## 2022-10-27 RX ORDER — BLOOD SUGAR DIAGNOSTIC
1 STRIP MISCELLANEOUS DAILY PRN
COMMUNITY

## 2022-10-27 RX ORDER — LANCETS 33 GAUGE
100 EACH MISCELLANEOUS 2 TIMES DAILY
Qty: 100 EACH | Refills: 3 | Status: SHIPPED | OUTPATIENT
Start: 2022-10-27 | End: 2022-10-27

## 2022-10-27 RX ORDER — APIXABAN 5 MG/1
5 TABLET, FILM COATED ORAL 2 TIMES DAILY
Qty: 180 TABLET | Refills: 0 | Status: SHIPPED | OUTPATIENT
Start: 2022-10-27

## 2022-10-27 NOTE — PROGRESS NOTES
Assessment/Plan:  THIS 81YEAR-OLD MALE is steadily improving since I saw him last   He remembers 2 5/3 objects  His movements are more fluid and less hesitant  There is no resting tremor  He has mild cogwheeling of the left arm that is only just slightly did even detectable in the right arm  He has a negative Romberg  He is not currently taking any medication for the for this very mild parkinsonism  Blood sugars are well controlled at home  He will have a hemoglobin A1c before the next visit  Heart failure is well controlled and no significant edema is present  Diet reviewed  Lifestyle modifications reviewed  Medications reviewed and ordered  Laboratory tests and studies reviewed and ordered  All patient's questions answered to patient satisfaction  Chief Complaint   Patient presents with   • Follow-up         Diagnoses and all orders for this visit:    DM type 2 with diabetic peripheral neuropathy ([facility] [address] )  -     Eliquis 5 MG; Take 1 tablet (5 mg total) by mouth 2 (two) times a day  -     Discontinue: OneTouch Delica Lancets 70T MISC; Use 306 applicators 2 (two) times a day  -     CBC and differential; Future  -     Comprehensive metabolic panel; Future  -     Hemoglobin A1C; Future  -     TSH, 3rd generation with Free T4 reflex; Future  -     Hemoglobin A1C; Future    Hypertensive heart disease with congestive heart failure, unspecified heart failure type (HCC)  -     atorvastatin (LIPITOR) 20 mg tablet; Take 1 tablet (20 mg total) by mouth daily  -     allopurinol (ZYLOPRIM) 100 mg tablet; Take 1 tablet (100 mg total) by mouth daily    Aortic prosthetic valve regurgitation, subsequent encounter    Non-rheumatic mitral regurgitation    Right bundle branch block with left anterior fascicular block    Chronic diastolic congestive heart failure, NYHA class 2 (HCC)  -     Eliquis 5 MG;  Take 1 tablet (5 mg total) by mouth 2 (two) times a day  -     Discontinue: OneTouch Delica Lancets 71H MISC; Use 100 applicators 2 (two) times a day  -     CBC and differential; Future  -     Comprehensive metabolic panel; Future  -     Hemoglobin A1C; Future  -     TSH, 3rd generation with Free T4 reflex; Future  -     Hemoglobin A1C; Future    Essential hypertension    Paroxysmal atrial fibrillation (HCC)    Vascular parkinsonism (HCC)    Stage 3a chronic kidney disease (HCC)  -     Eliquis 5 MG; Take 1 tablet (5 mg total) by mouth 2 (two) times a day  -     Discontinue: OneTouch Delica Lancets 06W MISC; Use 481 applicators 2 (two) times a day  -     CBC and differential; Future  -     Comprehensive metabolic panel; Future  -     Hemoglobin A1C; Future  -     TSH, 3rd generation with Free T4 reflex; Future  -     Hemoglobin A1C; Future    History of heart valve replacement with tissue graft    Hyponatremia with excess extracellular fluid volume  -     Eliquis 5 MG; Take 1 tablet (5 mg total) by mouth 2 (two) times a day  -     Discontinue: OneTouch Delica Lancets 54A MISC; Use 214 applicators 2 (two) times a day  -     CBC and differential; Future  -     Comprehensive metabolic panel; Future  -     Hemoglobin A1C; Future  -     TSH, 3rd generation with Free T4 reflex; Future  -     Hemoglobin A1C; Future    Hypertension, unspecified type  -     doxazosin (CARDURA) 4 mg tablet; Take 1 tablet (4 mg total) by mouth daily at bedtime  -     atorvastatin (LIPITOR) 20 mg tablet; Take 1 tablet (20 mg total) by mouth daily  -     allopurinol (ZYLOPRIM) 100 mg tablet; Take 1 tablet (100 mg total) by mouth daily    Chronic diastolic heart failure due to valvular disease (HCC)  -     doxazosin (CARDURA) 4 mg tablet; Take 1 tablet (4 mg total) by mouth daily at bedtime  -     atorvastatin (LIPITOR) 20 mg tablet; Take 1 tablet (20 mg total) by mouth daily  -     allopurinol (ZYLOPRIM) 100 mg tablet; Take 1 tablet (100 mg total) by mouth daily  -     Eliquis 5 MG;  Take 1 tablet (5 mg total) by mouth 2 (two) times a day  -     Discontinue: OneTouch Delica Lancets 13D MISC; Use 602 applicators 2 (two) times a day  -     CBC and differential; Future  -     Comprehensive metabolic panel; Future  -     Hemoglobin A1C; Future  -     TSH, 3rd generation with Free T4 reflex; Future  -     Hemoglobin A1C; Future    GERD with esophagitis  -     doxazosin (CARDURA) 4 mg tablet; Take 1 tablet (4 mg total) by mouth daily at bedtime    Type 2 diabetes mellitus without complication, without long-term current use of insulin (HCC)  -     atorvastatin (LIPITOR) 20 mg tablet; Take 1 tablet (20 mg total) by mouth daily  -     allopurinol (ZYLOPRIM) 100 mg tablet; Take 1 tablet (100 mg total) by mouth daily  -     metFORMIN (GLUCOPHAGE-XR) 500 mg 24 hr tablet; Take 1 tablet (500 mg total) by mouth daily with dinner  -     glucose blood (OneTouch Ultra) test strip; Use 1 each daily Test blood sugar    Hyperlipidemia, unspecified hyperlipidemia type  -     atorvastatin (LIPITOR) 20 mg tablet; Take 1 tablet (20 mg total) by mouth daily  -     allopurinol (ZYLOPRIM) 100 mg tablet; Take 1 tablet (100 mg total) by mouth daily    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)    Other orders  -     carbidopa-levodopa (SINEMET)  mg per tablet; carbidopa 25 mg-levodopa 100 mg tablet   TAKE 1 TABLET BY MOUTH THREE TIMES A DAY  -     glucose blood (OneTouch Ultra) test strip; Use 1 each daily as needed TEST TWICE DAILY AS NEEDED  -     Discontinue: OneTouch Delica Lancets 66X MISC; Use 468 applicators 2 (two) times a day        Subjective:     THIS 81YEAR-OLD MALE is steadily improving since I saw him last   He remembers 2 5/3 objects  His movements are more fluid and less hesitant  There is no resting tremor  He has mild cogwheeling of the left arm that is only just slightly did even detectable in the right arm  He has a negative Romberg  He is not currently taking any medication for the for this very mild parkinsonism    Blood sugars are well controlled at home  He will have a hemoglobin A1c before the next visit  Heart failure is well controlled and no significant edema is present  Patient ID: Nella Blount is a 81 y o  male          Current Outpatient Medications:   •  allopurinol (ZYLOPRIM) 100 mg tablet, Take 1 tablet (100 mg total) by mouth daily, Disp: 90 tablet, Rfl: 3  •  amLODIPine (NORVASC) 2 5 mg tablet, Take 1 tablet (2 5 mg total) by mouth daily, Disp: 90 tablet, Rfl: 3  •  ascorbic acid (VITAMIN C) 500 MG tablet, Take 500 mg by mouth daily, Disp: , Rfl:   •  atorvastatin (LIPITOR) 20 mg tablet, Take 1 tablet (20 mg total) by mouth daily, Disp: 90 tablet, Rfl: 1  •  BABY ASPIRIN PO, Take 81 mg by mouth daily, Disp: , Rfl:   •  Blood Glucose Monitoring Suppl (ONE TOUCH ULTRA 2) w/Device KIT, Use daily To test blood sugar 1x daily DX:diabetes, Disp: 1 kit, Rfl: 0  •  bumetanide (BUMEX) 1 mg tablet, Take 1 tablet (1 mg total) by mouth daily, Disp: 90 tablet, Rfl: 2  •  carbidopa-levodopa (SINEMET)  mg per tablet, carbidopa 25 mg-levodopa 100 mg tablet  TAKE 1 TABLET BY MOUTH THREE TIMES A DAY, Disp: , Rfl:   •  doxazosin (CARDURA) 4 mg tablet, Take 1 tablet (4 mg total) by mouth daily at bedtime, Disp: 90 tablet, Rfl: 3  •  Eliquis 5 MG, Take 1 tablet (5 mg total) by mouth 2 (two) times a day, Disp: 180 tablet, Rfl: 0  •  glucose blood (OneTouch Ultra) test strip, Use 1 each daily as needed TEST TWICE DAILY AS NEEDED, Disp: , Rfl:   •  glucose blood (OneTouch Ultra) test strip, Use 1 each daily Test blood sugar, Disp: 100 strip, Rfl: 6  •  glucose blood test strip, OneTouch Ultra Test strips, Disp: , Rfl:   •  hydrOXYzine HCL (ATARAX) 10 mg tablet, Take 1 tablet (10 mg total) by mouth in the morning, Disp: 30 tablet, Rfl: 1  •  Lancets (OneTouch Delica Plus GWEXAQ78I) MISC, USE TO TEST BLOOD SUGARS ONCE DAILY, Disp: 100 each, Rfl: 6  •  metFORMIN (GLUCOPHAGE-XR) 500 mg 24 hr tablet, Take 1 tablet (500 mg total) by mouth daily with dinner, Disp: 90 tablet, Rfl: 3  •  Multiple Vitamins-Minerals (multivitamin with minerals) tablet, Take 1 tablet by mouth daily, Disp: , Rfl:   •  nitroglycerin (NITROSTAT) 0 4 mg SL tablet, Place 1 tablet (0 4 mg total) under the tongue every 5 (five) minutes as needed for chest pain, Disp: 90 tablet, Rfl: 1  •  pantoprazole (PROTONIX) 40 mg tablet, TAKE 1 TABLET BY MOUTH DAILY AT BEDTIME, Disp: 90 tablet, Rfl: 1  •  Pomegranate, [place] gran[place], (POMEGRANATE PO), Take by mouth, Disp: , Rfl:   •  telmisartan (MICARDIS) 40 mg tablet, Take 1 tablet (40 mg total) by mouth daily, Disp: 90 tablet, Rfl: 3  •  TURMERIC PO, Take by mouth, Disp: , Rfl:   •  clotrimazole-betamethasone (LOTRISONE) 1-0 05 % cream, Apply topically 2 (two) times a day (Patient not taking: No sig reported), Disp: 45 g, Rfl: 1  •  fluticasone (FLONASE) 50 mcg/act nasal spray, 1 spray into each nostril 2 (two) times a day (Patient not taking: No sig reported), Disp: 48 g, Rfl: 3  •  ipratropium (ATROVENT) 0 03 % nasal spray, 1 spray into each nostril 3 (three) times a day (Patient not taking: Reported on 7/29/2021), Disp: 30 mL, Rfl: 11  •  ketoconazole (NIZORAL) 2 % cream, , Disp: , Rfl:   •  Lancets (OneTouch Delica Plus [id number]) MISC, Use 1 each in the morning Test once daily for diabetes  , Disp: 100 each, Rfl: 3  •  miconazole (MICOTIN) 2 % powder, Apply topically as needed for itching (Patient not taking: No sig reported), Disp: 70 g, Rfl: 5  •  mometasone (ELOCON) 0 1 % lotion, One drop affected ear canal daily at bedtime when necessary itching (Patient not taking: No sig reported), Disp: 30 mL, Rfl: 0  •  nystatin (MYCOSTATIN) cream, Apply topically 2 (two) times a day (Patient not taking: No sig reported), Disp: 150 g, Rfl: 2  •  nystatin (MYCOSTATIN) powder, Apply topically 3 (three) times a day (Patient not taking: No sig reported), Disp: 60 g, Rfl: 1  •  pimecrolimus (ELIDEL) 1 % cream, , Disp: , Rfl:   •  triamcinolone (KENALOG) 0 1 % cream, , Disp: , Rfl:   •  Zoster Vac Recomb Adjuvanted 50 MCG/0 5ML SUSR, Shingrix (PF) 50 mcg/0 5 mL intramuscular suspension, kit (Patient not taking: No sig reported), Disp: , Rfl:     The following portions of the patient's history were reviewed and updated as appropriate: allergies, current medications, past family history, past medical history, past social history, past surgical history and problem list     Review of Systems   Constitutional: Negative for appetite change, fatigue, fever and unexpected weight change  HENT: Negative for rhinorrhea, sinus pressure, sinus pain, sneezing and sore throat  Eyes: Negative for visual disturbance  Respiratory: Negative for cough, chest tightness, shortness of breath and wheezing  Cardiovascular: Negative for chest pain, palpitations and leg swelling  Gastrointestinal: Negative for abdominal distention, abdominal pain, blood in stool, constipation, diarrhea, nausea and vomiting  Endocrine: Negative for polydipsia and polyuria  Genitourinary: Negative for decreased urine volume, difficulty urinating, dysuria, hematuria and urgency  Musculoskeletal: Negative for arthralgias, back pain, joint swelling and neck pain  Skin: Negative for rash  Allergic/Immunologic: Negative for environmental allergies  Neurological: Negative for tremors, weakness, light-headedness, numbness and headaches  Hematological: Does not bruise/bleed easily  Psychiatric/Behavioral: Negative for agitation, behavioral problems, confusion and dysphoric mood  The patient is not nervous/anxious            Family History   Problem Relation Age of Onset   • Diabetes Mother    • Alcohol abuse Brother    • Bipolar disorder Brother    • Hypertension Brother    • Kidney disease Brother    • Heart disease Family    • Diabetes type II Family        Past Medical History:   Diagnosis Date   • Aortic valve regurgitation 01/26/2017   • Arthritis    • Cancer [facility])    • Coronary artery disease    • Coronary artery disease with angina pectoris ([facility] [address] ) 3/19/2019   • Edema 12/02/2016   • GERD (gastroesophageal reflux disease)    • Heart disease    • Heart valve disorder    • Heart valve replaced    • History of basal cell cancer    • HL (hearing loss)     Wears hearing aids   • Hx of tissue graft 03/29/2017   • Hyperlipemia 01/26/2017   • Hypertension    • Non-rheumatic mitral regurgitation 10/21/2016   • Obesity    • Right bundle branch block (RBBB), 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       Past Surgical History:   Procedure Laterality Date   • ADENOIDECTOMY     • APPENDECTOMY     • CARDIAC SURGERY     • CARDIAC VALVE REPLACEMENT     • EYE SURGERY     • HERNIA REPAIR     • TONSILLECTOMY     • VASECTOMY         Social History     Socioeconomic History   • Marital status: /Civil Union     Spouse name: None   • Number of children: 3   • Years of education: None   • Highest education level: None   Occupational History   • Occupation:  retired [employer]   Tobacco Use   • Smoking status: Former Smoker     Packs/day: 0 50     Types: Cigarettes, Cigars   • Smokeless tobacco: Former User   • Tobacco comment: quit 10 years ago, NEVER A SMOKER AS PER [employer]   Vaping Use   • Vaping Use: Never used   Substance and Sexual Activity   • Alcohol use: No   • Drug use: No   • Sexual activity: Not Currently     Partners: Female   Other Topics Concern   • None   Social History Narrative    PT states he does not drink any caffeine      Social Determinants of Health     Financial Resource Strain: Not on file   Food Insecurity: Not on file   Transportation Needs: Not on file   Physical Activity: Not on file   Stress: Not on file   Social Connections: Not on file   Intimate Partner Violence: Not on file   Housing Stability: Not on file       Allergies   Allergen Reactions   • Orphenadrine GI Intolerance            Objective:    /60 (BP Location: Right arm, Patient Position: Sitting, Cuff Size: Standard)   Pulse 74   Temp 98 6 °F (37 °C) (Temporal)   Resp 16   Ht 5' 9" (1 753 m)   Wt 97 5 kg (215 lb)   SpO2 96%   BMI 31 75 kg/m²        Physical Exam  Constitutional:       General: He is not in acute distress  Appearance: He is well-developed  HENT:      Head: Normocephalic and atraumatic  Nose: Nose normal       Mouth/Throat:      Pharynx: No oropharyngeal exudate  Eyes:      General: No scleral icterus  Conjunctiva/sclera: Conjunctivae normal       Pupils: Pupils are equal, round, and reactive to light  Neck:      Thyroid: No thyromegaly  Vascular: Carotid bruit present  No JVD  Trachea: No tracheal deviation  Cardiovascular:      Rate and Rhythm: Normal rate and regular rhythm  Heart sounds: No friction rub  No gallop  Pulmonary:      Effort: Pulmonary effort is normal  No respiratory distress  Breath sounds: No wheezing or rales  Chest:      Chest wall: No tenderness  Abdominal:      General: Bowel sounds are normal  There is no distension  Palpations: Abdomen is soft  There is no mass  Tenderness: There is no abdominal tenderness  There is no guarding or rebound  Musculoskeletal:         General: No deformity  Normal range of motion  Cervical back: Normal range of motion and neck supple  Lymphadenopathy:      Cervical: No cervical adenopathy  Skin:     General: Skin is warm and dry  Findings: No rash  Neurological:      Mental Status: He is alert and oriented to person, place, and time  Cranial Nerves: No cranial nerve deficit  Coordination: Coordination normal       Gait: Gait abnormal       Comments: Mild bradykinesia, mild cogwheeling left arm and trace cogwheeling right arm, negative Romberg, no resting tremor   Psychiatric:         Behavior: Behavior normal          Thought Content:  Thought content normal          Judgment: Judgment normal

## 2022-11-11 ENCOUNTER — TELEPHONE (OUTPATIENT)
Dept: NEUROLOGY | Facility: CLINIC | Age: 87
End: 2022-11-11

## 2022-12-02 ENCOUNTER — APPOINTMENT (OUTPATIENT)
Dept: LAB | Age: 87
End: 2022-12-02

## 2022-12-02 DIAGNOSIS — N18.31 STAGE 3A CHRONIC KIDNEY DISEASE (HCC): ICD-10-CM

## 2022-12-02 DIAGNOSIS — E11.42 DM TYPE 2 WITH DIABETIC PERIPHERAL NEUROPATHY (HCC): ICD-10-CM

## 2022-12-02 DIAGNOSIS — I50.32 CHRONIC DIASTOLIC CONGESTIVE HEART FAILURE, NYHA CLASS 2 (HCC): ICD-10-CM

## 2022-12-02 DIAGNOSIS — E87.1 HYPONATREMIA WITH EXCESS EXTRACELLULAR FLUID VOLUME: ICD-10-CM

## 2022-12-02 DIAGNOSIS — I50.32 CHRONIC DIASTOLIC HEART FAILURE DUE TO VALVULAR DISEASE (HCC): ICD-10-CM

## 2022-12-02 DIAGNOSIS — I38 CHRONIC DIASTOLIC HEART FAILURE DUE TO VALVULAR DISEASE (HCC): ICD-10-CM

## 2022-12-02 LAB
ALBUMIN SERPL BCP-MCNC: 3.1 G/DL (ref 3.5–5)
ALP SERPL-CCNC: 93 U/L (ref 46–116)
ALT SERPL W P-5'-P-CCNC: 41 U/L (ref 12–78)
ANION GAP SERPL CALCULATED.3IONS-SCNC: 1 MMOL/L (ref 4–13)
AST SERPL W P-5'-P-CCNC: 32 U/L (ref 5–45)
BASOPHILS # BLD AUTO: 0.05 THOUSANDS/ÂΜL (ref 0–0.1)
BASOPHILS NFR BLD AUTO: 1 % (ref 0–1)
BILIRUB SERPL-MCNC: 0.46 MG/DL (ref 0.2–1)
BUN SERPL-MCNC: 19 MG/DL (ref 5–25)
CALCIUM ALBUM COR SERPL-MCNC: 9.7 MG/DL (ref 8.3–10.1)
CALCIUM SERPL-MCNC: 9 MG/DL (ref 8.3–10.1)
CHLORIDE SERPL-SCNC: 104 MMOL/L (ref 96–108)
CO2 SERPL-SCNC: 29 MMOL/L (ref 21–32)
CREAT SERPL-MCNC: 1.09 MG/DL (ref 0.6–1.3)
EOSINOPHIL # BLD AUTO: 0.29 THOUSAND/ÂΜL (ref 0–0.61)
EOSINOPHIL NFR BLD AUTO: 3 % (ref 0–6)
ERYTHROCYTE [DISTWIDTH] IN BLOOD BY AUTOMATED COUNT: 13.3 % (ref 11.6–15.1)
GFR SERPL CREATININE-BSD FRML MDRD: 61 ML/MIN/1.73SQ M
GLUCOSE P FAST SERPL-MCNC: 146 MG/DL (ref 65–99)
HCT VFR BLD AUTO: 36.3 % (ref 36.5–49.3)
HGB BLD-MCNC: 11.7 G/DL (ref 12–17)
IMM GRANULOCYTES # BLD AUTO: 0.02 THOUSAND/UL (ref 0–0.2)
IMM GRANULOCYTES NFR BLD AUTO: 0 % (ref 0–2)
LYMPHOCYTES # BLD AUTO: 1.78 THOUSANDS/ÂΜL (ref 0.6–4.47)
LYMPHOCYTES NFR BLD AUTO: 19 % (ref 14–44)
MCH RBC QN AUTO: 33.5 PG (ref 26.8–34.3)
MCHC RBC AUTO-ENTMCNC: 32.2 G/DL (ref 31.4–37.4)
MCV RBC AUTO: 104 FL (ref 82–98)
MONOCYTES # BLD AUTO: 0.8 THOUSAND/ÂΜL (ref 0.17–1.22)
MONOCYTES NFR BLD AUTO: 8 % (ref 4–12)
NEUTROPHILS # BLD AUTO: 6.53 THOUSANDS/ÂΜL (ref 1.85–7.62)
NEUTS SEG NFR BLD AUTO: 69 % (ref 43–75)
NRBC BLD AUTO-RTO: 0 /100 WBCS
PLATELET # BLD AUTO: 167 THOUSANDS/UL (ref 149–390)
PMV BLD AUTO: 11 FL (ref 8.9–12.7)
POTASSIUM SERPL-SCNC: 4.5 MMOL/L (ref 3.5–5.3)
PROT SERPL-MCNC: 6.7 G/DL (ref 6.4–8.4)
RBC # BLD AUTO: 3.49 MILLION/UL (ref 3.88–5.62)
SODIUM SERPL-SCNC: 134 MMOL/L (ref 135–147)
TSH SERPL DL<=0.05 MIU/L-ACNC: 2.83 UIU/ML (ref 0.45–4.5)
WBC # BLD AUTO: 9.47 THOUSAND/UL (ref 4.31–10.16)

## 2022-12-03 LAB
EST. AVERAGE GLUCOSE BLD GHB EST-MCNC: 140 MG/DL
HBA1C MFR BLD: 6.5 %

## 2022-12-08 ENCOUNTER — OFFICE VISIT (OUTPATIENT)
Dept: FAMILY MEDICINE CLINIC | Facility: CLINIC | Age: 87
End: 2022-12-08

## 2022-12-08 VITALS
HEIGHT: 69 IN | SYSTOLIC BLOOD PRESSURE: 126 MMHG | HEART RATE: 66 BPM | OXYGEN SATURATION: 98 % | TEMPERATURE: 98 F | DIASTOLIC BLOOD PRESSURE: 58 MMHG | WEIGHT: 214 LBS | BODY MASS INDEX: 31.7 KG/M2 | RESPIRATION RATE: 16 BRPM

## 2022-12-08 DIAGNOSIS — I50.32 CHRONIC DIASTOLIC CONGESTIVE HEART FAILURE, NYHA CLASS 2 (HCC): ICD-10-CM

## 2022-12-08 DIAGNOSIS — Z95.0 PACEMAKER: ICD-10-CM

## 2022-12-08 DIAGNOSIS — Z95.4 HISTORY OF HEART VALVE REPLACEMENT WITH TISSUE GRAFT: ICD-10-CM

## 2022-12-08 DIAGNOSIS — E78.00 PURE HYPERCHOLESTEROLEMIA: ICD-10-CM

## 2022-12-08 DIAGNOSIS — R41.89 COGNITIVE DECLINE: ICD-10-CM

## 2022-12-08 DIAGNOSIS — I48.0 PAROXYSMAL ATRIAL FIBRILLATION (HCC): ICD-10-CM

## 2022-12-08 DIAGNOSIS — Z79.01 CURRENT USE OF LONG TERM ANTICOAGULATION: ICD-10-CM

## 2022-12-08 DIAGNOSIS — I10 ESSENTIAL HYPERTENSION: ICD-10-CM

## 2022-12-08 DIAGNOSIS — N18.31 STAGE 3A CHRONIC KIDNEY DISEASE (HCC): ICD-10-CM

## 2022-12-08 DIAGNOSIS — I70.203 ATHEROSCLEROSIS OF NATIVE ARTERY OF BOTH LOWER EXTREMITIES, WITH UNSPECIFIED PRESENCE OF CLINICAL MANIFESTATION (HCC): ICD-10-CM

## 2022-12-08 DIAGNOSIS — E11.42 DM TYPE 2 WITH DIABETIC PERIPHERAL NEUROPATHY (HCC): ICD-10-CM

## 2022-12-08 DIAGNOSIS — R53.1 WEAKNESS: ICD-10-CM

## 2022-12-08 DIAGNOSIS — F33.9 DEPRESSION, RECURRENT (HCC): ICD-10-CM

## 2022-12-08 DIAGNOSIS — I25.10 MULTIPLE VESSEL CORONARY ARTERY DISEASE: ICD-10-CM

## 2022-12-08 DIAGNOSIS — T82.897D AORTIC PROSTHETIC VALVE REGURGITATION, SUBSEQUENT ENCOUNTER: ICD-10-CM

## 2022-12-08 DIAGNOSIS — I45.2 RIGHT BUNDLE BRANCH BLOCK WITH LEFT ANTERIOR FASCICULAR BLOCK: ICD-10-CM

## 2022-12-08 DIAGNOSIS — G21.4 VASCULAR PARKINSONISM (HCC): ICD-10-CM

## 2022-12-08 DIAGNOSIS — Z00.00 MEDICARE ANNUAL WELLNESS VISIT, SUBSEQUENT: Primary | ICD-10-CM

## 2022-12-08 LAB — SL AMB POCT HEMOGLOBIN AIC: 6.7 (ref ?–6.5)

## 2022-12-08 NOTE — PROGRESS NOTES
Assessment and Plan: This 64-year-old male is here for Medicare wellness subsequent  He had his flu vaccine and fall COVID booster in September  He is up-to-date on his Prevnar  He has not had any falls  History of vascular Parkinson's and is not treated with medication  He walks with a walker  His wife supports his cognitive disability  He is alert today  Hypertension is well controlled  Chronic diastolic congestive heart failure is controlled  Hyponatremia is controlled with fluid restriction and his sodium was 134  Renal function is within normal limits  Type 2 diabetes is very well controlled with a hemoglobin A1c of 6 5 on the current medication  Patient is on Eliquis 5 mg twice daily  Patient has a prosthetic aortic valve with a bioprosthesis  And a history of paroxysmal atrial fibrillation  He has a pacemaker    Problem List Items Addressed This Visit        Endocrine    DM type 2 with diabetic peripheral neuropathy (HCC)    Relevant Orders    POCT hemoglobin A1c (Completed)    CBC and differential    Comprehensive metabolic panel       Cardiovascular and Mediastinum    Multiple vessel coronary artery disease    Aortic prosthetic valve regurgitation    Right bundle branch block with left anterior fascicular block    Chronic diastolic congestive heart failure, NYHA class 2 ([address] )    Relevant Orders    CBC and differential    Comprehensive metabolic panel    Atherosclerosis of native artery of extremity (HCC)    Essential hypertension    Paroxysmal atrial fibrillation (HCC)       Nervous and Auditory    Vascular parkinsonism (HCC)       Genitourinary    Stage 3a chronic kidney disease ([address] )    Relevant Orders    CBC and differential    Comprehensive metabolic panel       Other    History of heart valve replacement with tissue graft    Hyperlipidemia    Pacemaker    Weakness    Cognitive decline    Depression, recurrent ([address] )   Other Visit Diagnoses     Medicare annual wellness visit, subsequent    -  Primary    Relevant Orders    CBC and differential    Comprehensive metabolic panel    Current use of long term anticoagulation        Relevant Orders    CBC and differential    Comprehensive metabolic panel           Preventive health issues were discussed with patient, and age appropriate screening tests were ordered as noted in patient's After Visit Summary  Personalized health advice and appropriate referrals for health education or preventive services given if needed, as noted in patient's After Visit Summary  History of Present Illness:     Patient presents for a Medicare Wellness Visit    This 81-year-old male is here for Medicare wellness subsequent  He had his flu vaccine and fall COVID booster in September  He is up-to-date on his Prevnar  He has not had any falls  History of vascular Parkinson's and is not treated with medication  He walks with a walker  His wife supports his cognitive disability  He is alert today  Hypertension is well controlled  Chronic diastolic congestive heart failure is controlled  Hyponatremia is controlled with fluid restriction and his sodium was 134  Renal function is within normal limits  Type 2 diabetes is very well controlled with a hemoglobin A1c of 6 5 on the current medication  Patient is on Eliquis 5 mg twice daily  Patient has a prosthetic aortic valve with a bioprosthesis  And a history of paroxysmal atrial fibrillation  He has a pacemaker  Patient Care Team:  Mary Becker MD as PCP - General (Internal Medicine)     Review of Systems:     Review of Systems   Constitutional: Negative for appetite change, fatigue, fever and unexpected weight change  HENT: Negative for rhinorrhea, sinus pressure, sinus pain, sneezing and sore throat  Eyes: Negative for visual disturbance  Respiratory: Negative for cough, chest tightness, shortness of breath and wheezing      Cardiovascular: Negative for chest pain, palpitations and leg swelling  Gastrointestinal: Negative for abdominal distention, abdominal pain, blood in stool, constipation, diarrhea, nausea and vomiting  Endocrine: Negative for polydipsia and polyuria  Genitourinary: Negative for decreased urine volume, difficulty urinating, dysuria, hematuria and urgency  Musculoskeletal: Negative for arthralgias, back pain, joint swelling and neck pain  Skin: Negative for rash  Allergic/Immunologic: Negative for environmental allergies  Neurological: Negative for tremors, weakness, light-headedness, numbness and headaches  Hematological: Does not bruise/bleed easily  Psychiatric/Behavioral: Negative for agitation, behavioral problems, confusion and dysphoric mood  The patient is not nervous/anxious           Problem List:     Patient Active Problem List   Diagnosis   • Hypertensive heart disease with congestive heart failure (HCC)   • History of heart valve replacement with tissue graft   • Hyperlipidemia   • Multiple vessel coronary artery disease   • Non-rheumatic mitral regurgitation   • Peripheral edema   • Aortic prosthetic valve regurgitation   • Right bundle branch block with left anterior fascicular block   • Chest pain in adult   • Hyponatremia with excess extracellular fluid volume   • 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   • Spinal stenosis of lumbar region without neurogenic claudication   • Edema   • Chronic diastolic congestive heart failure, NYHA class 2 ([address] )   • Atherosclerosis of native artery of extremity ([address] )   • DM type 2 with diabetic peripheral neuropathy ([address] )   • Atherosclerosis 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 ([address] )   • Peripheral artery insufficiency ([address] )   • Ambulatory dysfunction   • Bradycardia   • Pacemaker   • Weakness   • Essential hypertension   • 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   • Cognitive decline   • Vascular parkinsonism ([address] )   • Depression, recurrent ([facility])   • Paroxysmal atrial fibrillation ([facility] )   • Stage 3a chronic kidney disease ([facility] )      Past Medical and Surgical History:     Past Medical History:   Diagnosis Date   • Aortic valve regurgitation 01/26/2017   • Arthritis    • Cancer [facility])    • Coronary artery disease    • Coronary artery disease with angina pectoris ([facility] ) 3/19/2019   • Edema 12/02/2016   • GERD (gastroesophageal reflux disease)    • Heart disease    • Heart valve disorder    • Heart valve replaced    • History of basal cell cancer    • HL (hearing loss)     Wears hearing aids   • Hx of tissue graft 03/29/2017   • Hyperlipemia 01/26/2017   • Hypertension    • Non-rheumatic mitral regurgitation 10/21/2016   • Obesity    • Right bundle branch block (RBBB), 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     Past Surgical History:   Procedure Laterality Date   • ADENOIDECTOMY     • APPENDECTOMY     • CARDIAC SURGERY     • CARDIAC VALVE REPLACEMENT     • EYE SURGERY     • HERNIA REPAIR     • TONSILLECTOMY     • VASECTOMY        Family History:     Family History   Problem Relation Age of Onset   • Diabetes Mother    • Alcohol abuse Brother    • Bipolar disorder Brother    • Hypertension Brother    • Kidney disease Brother    • Heart disease Family    • Diabetes type II Family       Social History:     Social History     Socioeconomic History   • Marital status: /Civil Union     Spouse name: None   • Number of children: 3   • Years of education: None   • Highest education level: None   Occupational History   • Occupation:  retired [employer]   Tobacco Use   • Smoking status: Former     Packs/day: 0 50     Types: Cigarettes, Cigars   • Smokeless tobacco: Former   • Tobacco comments:     quit 10 years ago, NEVER A SMOKER AS PER [employer]   Vaping Use   • Vaping Use: Never used   Substance and Sexual Activity   • Alcohol use: No   • Drug use: No   • Sexual activity: Not Currently     Partners: Female   Other Topics Concern   • None Social History Narrative    PT states he does not drink any caffeine      Social Determinants of Health     Financial Resource Strain: Low Risk    • Difficulty of Paying Living Expenses: Not very hard   Food Insecurity: Not on file   Transportation Needs: No Transportation Needs   • Lack of Transportation (Medical): No   • Lack of Transportation (Non-Medical):  No   Physical Activity: Not on file   Stress: Not on file   Social Connections: Not on file   Intimate Partner Violence: Not on file   Housing Stability: Not on file      Medications and Allergies:     Current Outpatient Medications   Medication Sig Dispense Refill   • allopurinol (ZYLOPRIM) 100 mg tablet Take 1 tablet (100 mg total) by mouth daily 90 tablet 3   • amLODIPine (NORVASC) 2 5 mg tablet Take 1 tablet (2 5 mg total) by mouth daily 90 tablet 3   • ascorbic acid (VITAMIN C) 500 MG tablet Take 500 mg by mouth daily     • atorvastatin (LIPITOR) 20 mg tablet Take 1 tablet (20 mg total) by mouth daily 90 tablet 1   • BABY ASPIRIN PO Take 81 mg by mouth daily     • Blood Glucose Monitoring Suppl (ONE TOUCH ULTRA 2) w/Device KIT Use daily To test blood sugar 1x daily DX:diabetes 1 kit 0   • bumetanide (BUMEX) 1 mg tablet Take 1 tablet (1 mg total) by mouth daily 90 tablet 2   • carbidopa-levodopa (SINEMET)  mg per tablet carbidopa 25 mg-levodopa 100 mg tablet   TAKE 1 TABLET BY MOUTH THREE TIMES A DAY     • doxazosin (CARDURA) 4 mg tablet Take 1 tablet (4 mg total) by mouth daily at bedtime 90 tablet 3   • Eliquis 5 MG Take 1 tablet (5 mg total) by mouth 2 (two) times a day 180 tablet 0   • glucose blood (OneTouch Ultra) test strip Use 1 each daily as needed TEST TWICE DAILY AS NEEDED     • glucose blood (OneTouch Ultra) test strip Use 1 each daily Test blood sugar 100 strip 6   • glucose blood test strip OneTouch Ultra Test strips     • glucose blood test strip OneTouch Ultra Test strips   USE 1 EACH DAILY TO TEST BLOOD SUGAR     • hydrOXYzine HCL (ATARAX) 10 mg tablet Take 1 tablet (10 mg total) by mouth in the morning 30 tablet 1   • Lancets (OneTouch Delica Plus [id number]) MISC USE TO TEST BLOOD SUGARS ONCE DAILY 100 each 6   • Lancets (OneTouch Delica Plus [id number]) MISC Use 1 each in the morning Test once daily for diabetes   100 each 3   • metFORMIN (GLUCOPHAGE-XR) 500 mg 24 hr tablet Take 1 tablet (500 mg total) by mouth daily with dinner 90 tablet 3   • Multiple Vitamins-Minerals (multivitamin with minerals) tablet Take 1 tablet by mouth daily     • nitroglycerin (NITROSTAT) 0 4 mg SL tablet Place 1 tablet (0 4 mg total) under the tongue every 5 (five) minutes as needed for chest pain 90 tablet 1   • pantoprazole (PROTONIX) 40 mg tablet TAKE 1 TABLET BY MOUTH DAILY AT BEDTIME 90 tablet 1   • Pomegranate, [place] gran[place], (POMEGRANATE PO) Take by mouth     • telmisartan (MICARDIS) 40 mg tablet Take 1 tablet (40 mg total) by mouth daily 90 tablet 3   • TURMERIC PO Take by mouth     • clotrimazole-betamethasone (LOTRISONE) 1-0 05 % cream Apply topically 2 (two) times a day (Patient not taking: Reported on 8/18/2022) 45 g 1   • fluticasone (FLONASE) 50 mcg/act nasal spray 1 spray into each nostril 2 (two) times a day (Patient not taking: Reported on 8/18/2022) 48 g 3   • ipratropium (ATROVENT) 0 03 % nasal spray 1 spray into each nostril 3 (three) times a day (Patient not taking: Reported on 7/29/2021) 30 mL 11   • ketoconazole (NIZORAL) 2 % cream  (Patient not taking: Reported on 12/8/2022)     • miconazole (MICOTIN) 2 % powder Apply topically as needed for itching (Patient not taking: Reported on 8/18/2022) 70 g 5   • mometasone (ELOCON) 0 1 % lotion One drop affected ear canal daily at bedtime when necessary itching (Patient not taking: Reported on 8/18/2022) 30 mL 0   • nystatin (MYCOSTATIN) cream Apply topically 2 (two) times a day (Patient not taking: Reported on 8/18/2022) 150 g 2   • nystatin (MYCOSTATIN) powder Apply topically 3 (three) times a day (Patient not taking: Reported on 8/18/2022) 60 g 1   • pimecrolimus (ELIDEL) 1 % cream  (Patient not taking: Reported on 8/18/2022)     • triamcinolone (KENALOG) 0 1 % cream  (Patient not taking: Reported on 8/18/2022)     • Zoster Vac Recomb Adjuvanted 50 MCG/0 5ML SUSR Shingrix (PF) 50 mcg/0 5 mL intramuscular suspension, kit (Patient not taking: Reported on 10/27/2022)       No current facility-administered medications for this visit  Allergies   Allergen Reactions   • Orphenadrine GI Intolerance      Immunizations:     Immunization History   Administered Date(s) Administered   • COVID-19 MODERNA VACC 0 5 ML IM 01/18/2021, 02/15/2021   • COVID-19, unspecified 09/12/2022   • INFLUENZA 10/06/2016, 10/10/2017, 12/21/2020, 09/12/2022   • Influenza Split High Dose Preservative Free IM 10/06/2016, 10/10/2017   • Influenza, high dose seasonal 0 7 mL 10/05/2018, 08/17/2020   • Pneumococcal Conjugate 13-Valent 01/26/2017   • Pneumococcal Polysaccharide PPV23 12/12/2014   • Tdap 12/06/2019   • Unknown 01/18/2021, 02/15/2021   • Zoster Vaccine Recombinant 07/29/2019, 09/24/2019   • influenza, trivalent, adjuvanted 10/05/2018, 09/05/2019, 08/17/2020      Health Maintenance: There are no preventive care reminders to display for this patient  Topic Date Due   • COVID-19 Vaccine (4 - Booster) 12/05/2022      Medicare Screening Tests and Risk Assessments:     Ariel Kaufman is here for his Subsequent Wellness visit  Health Risk Assessment:   Patient rates overall health as good  Patient feels that their physical health rating is same  Patient is satisfied with their life  Eyesight was rated as same  Hearing was rated as same  Patient feels that their emotional and mental health rating is slightly worse  Patients states they are never, rarely angry  Patient states they are never, rarely unusually tired/fatigued  Pain experienced in the last 7 days has been none   Patient states that he has experienced no weight loss or gain in last 6 months  Fall Risk Screening: In the past year, patient has experienced: no history of falling in past year      Home Safety:  Patient does not have trouble with stairs inside or outside of their home  Patient has working smoke alarms and has working carbon monoxide detector  Home safety hazards include: none  Nutrition:   Current diet is Regular  Medications:   Patient is currently taking over-the-counter supplements  OTC medications include: see medication list  Patient is able to manage medications  Activities of Daily Living (ADLs)/Instrumental Activities of Daily Living (IADLs):   Walk and transfer into and out of bed and chair?: Yes  Dress and groom yourself?: Yes    Bathe or shower yourself?: Yes    Feed yourself?  Yes  Do your laundry/housekeeping?: No  Manage your money, pay your bills and track your expenses?: No  Make your own meals?: No    Do your own shopping?: No    Previous Hospitalizations:   Any hospitalizations or ED visits within the last 12 months?: No      Advance Care Planning:   Living will: Yes    Advanced directive: Yes      PREVENTIVE SCREENINGS      Cardiovascular Screening:    General: Screening Not Indicated and History Lipid Disorder      Diabetes Screening:     General: Screening Not Indicated and History Diabetes      Colorectal Cancer Screening:     General: Screening Not Indicated      Prostate Cancer Screening:    General: Screening Not Indicated      Abdominal Aortic Aneurysm (AAA) Screening:    Risk factors include: tobacco use        Lung Cancer Screening:     General: Screening Not Indicated    Screening, Brief Intervention, and Referral to Treatment (SBIRT)    Screening    Typical number of drinks in a week: 0    Single Item Drug Screening:  How often have you used an illegal drug (including marijuana) or a prescription medication for non-medical reasons in the past year? never    Single Item Drug Screen Score: 0  Interpretation: Negative screen for possible drug use disorder    No results found  Physical Exam:     /58 (BP Location: Left arm, Patient Position: Sitting, Cuff Size: Large)   Pulse 66   Temp 98 °F (36 7 °C) (Tympanic)   Resp 16   Ht 5' 9" (1 753 m)   Wt 97 1 kg (214 lb)   SpO2 98%   BMI 31 60 kg/m²     Physical Exam  Vitals and nursing note reviewed  Constitutional:       Appearance: He is well-developed  HENT:      Head: Normocephalic and atraumatic  Nose: Nose normal    Eyes:      Conjunctiva/sclera: Conjunctivae normal       Pupils: Pupils are equal, round, and reactive to light  Neck:      Thyroid: Thyromegaly present  Cardiovascular:      Rate and Rhythm: Normal rate and regular rhythm  Heart sounds: Murmur heard  Pulmonary:      Effort: Pulmonary effort is normal       Breath sounds: Normal breath sounds  Abdominal:      General: Bowel sounds are normal       Palpations: Abdomen is soft  There is no mass  Tenderness: There is no abdominal tenderness  There is no rebound  Hernia: No hernia is present  Musculoskeletal:         General: No tenderness  Normal range of motion  Cervical back: Normal range of motion and neck supple  Right lower leg: Edema present  Left lower leg: Edema present  Lymphadenopathy:      Cervical: No cervical adenopathy  Skin:     General: Skin is warm and dry  Findings: No rash  Neurological:      Mental Status: He is alert and oriented to person, place, and time  Cranial Nerves: No cranial nerve deficit  Coordination: Coordination normal    Psychiatric:         Behavior: Behavior normal          Thought Content:  Thought content normal          Judgment: Judgment normal           Douglas Cabrera MD

## 2022-12-08 NOTE — PROGRESS NOTES
Assessment/Plan: This 50-year-old male is here for Medicare wellness subsequent  He had his flu vaccine and fall COVID booster in September  He is up-to-date on his Prevnar  He has not had any falls  History of vascular Parkinson's and is not treated with medication  He walks with a walker  His wife supports his cognitive disability  He is alert today  Hypertension is well controlled  Chronic diastolic congestive heart failure is controlled  Hyponatremia is controlled with fluid restriction and his sodium was 134  Renal function is within normal limits  Type 2 diabetes is very well controlled with a hemoglobin A1c of 6 5 on the current medication  Patient is on Eliquis 5 mg twice daily  Patient has a prosthetic aortic valve with a bioprosthesis  And a history of paroxysmal atrial fibrillation  He has a pacemaker  Diet reviewed  Lifestyle modifications reviewed  Medications reviewed and ordered  Laboratory tests and studies reviewed and ordered  All patient's questions answered to patient satisfaction  Chief Complaint   Patient presents with   • Medicare Wellness Visit     Awv           Diagnoses and all orders for this visit:    Medicare annual wellness visit, subsequent  -     CBC and differential; Future  -     Comprehensive metabolic panel; Future    Current use of long term anticoagulation  -     CBC and differential; Future  -     Comprehensive metabolic panel; Future    DM type 2 with diabetic peripheral neuropathy (HCC)  -     POCT hemoglobin A1c  -     CBC and differential; Future  -     Comprehensive metabolic panel; Future    Aortic prosthetic valve regurgitation, subsequent encounter    Atherosclerosis of native artery of both lower extremities, with unspecified presence of clinical manifestation (HCC)    Chronic diastolic congestive heart failure, NYHA class 2 (HCC)  -     CBC and differential; Future  -     Comprehensive metabolic panel;  Future    Essential hypertension    Multiple vessel coronary artery disease    Paroxysmal atrial fibrillation (HCC)    Right bundle branch block with left anterior fascicular block    Vascular parkinsonism (HCC)    Stage 3a chronic kidney disease (HCC)  -     CBC and differential; Future  -     Comprehensive metabolic panel; Future    Cognitive decline    Depression, recurrent ([facility] [address] )    History of heart valve replacement with tissue graft    Pure hypercholesterolemia    Pacemaker    Weakness    Other orders  -     glucose blood test strip; OneTouch Ultra Test strips   USE 1 EACH DAILY TO TEST BLOOD SUGAR        Subjective: This 60-year-old male is here for Medicare wellness subsequent  He had his flu vaccine and fall COVID booster in September  He is up-to-date on his Prevnar  He has not had any falls  History of vascular Parkinson's and is not treated with medication  He walks with a walker  His wife supports his cognitive disability  He is alert today  Hypertension is well controlled  Chronic diastolic congestive heart failure is controlled  Hyponatremia is controlled with fluid restriction and his sodium was 134  Renal function is within normal limits  Type 2 diabetes is very well controlled with a hemoglobin A1c of 6 5 on the current medication  Patient is on Eliquis 5 mg twice daily  Patient has a prosthetic aortic valve with a bioprosthesis  And a history of paroxysmal atrial fibrillation  He has a pacemaker  Patient ID: Maria Esther Gay is a 81 y o  male          Current Outpatient Medications:   •  allopurinol (ZYLOPRIM) 100 mg tablet, Take 1 tablet (100 mg total) by mouth daily, Disp: 90 tablet, Rfl: 3  •  amLODIPine (NORVASC) 2 5 mg tablet, Take 1 tablet (2 5 mg total) by mouth daily, Disp: 90 tablet, Rfl: 3  •  ascorbic acid (VITAMIN C) 500 MG tablet, Take 500 mg by mouth daily, Disp: , Rfl:   •  atorvastatin (LIPITOR) 20 mg tablet, Take 1 tablet (20 mg total) by mouth daily, Disp: 90 tablet, Rfl: 1  •  BABY ASPIRIN PO, Take 81 mg by mouth daily, Disp: , Rfl:   •  Blood Glucose Monitoring Suppl (ONE TOUCH ULTRA 2) w/Device KIT, Use daily To test blood sugar 1x daily DX:diabetes, Disp: 1 kit, Rfl: 0  •  bumetanide (BUMEX) 1 mg tablet, Take 1 tablet (1 mg total) by mouth daily, Disp: 90 tablet, Rfl: 2  •  carbidopa-levodopa (SINEMET)  mg per tablet, carbidopa 25 mg-levodopa 100 mg tablet  TAKE 1 TABLET BY MOUTH THREE TIMES A DAY, Disp: , Rfl:   •  doxazosin (CARDURA) 4 mg tablet, Take 1 tablet (4 mg total) by mouth daily at bedtime, Disp: 90 tablet, Rfl: 3  •  Eliquis 5 MG, Take 1 tablet (5 mg total) by mouth 2 (two) times a day, Disp: 180 tablet, Rfl: 0  •  glucose blood (OneTouch Ultra) test strip, Use 1 each daily as needed TEST TWICE DAILY AS NEEDED, Disp: , Rfl:   •  glucose blood (OneTouch Ultra) test strip, Use 1 each daily Test blood sugar, Disp: 100 strip, Rfl: 6  •  glucose blood test strip, OneTouch Ultra Test strips, Disp: , Rfl:   •  glucose blood test strip, OneTouch Ultra Test strips  USE 1 EACH DAILY TO TEST BLOOD SUGAR, Disp: , Rfl:   •  hydrOXYzine HCL (ATARAX) 10 mg tablet, Take 1 tablet (10 mg total) by mouth in the morning, Disp: 30 tablet, Rfl: 1  •  Lancets (OneTouch Delica Plus [id number]) MISC, USE TO TEST BLOOD SUGARS ONCE DAILY, Disp: 100 each, Rfl: 6  •  Lancets (OneTouch Delica Plus [id number]) MISC, Use 1 each in the morning Test once daily for diabetes  , Disp: 100 each, Rfl: 3  •  metFORMIN (GLUCOPHAGE-XR) 500 mg 24 hr tablet, Take 1 tablet (500 mg total) by mouth daily with dinner, Disp: 90 tablet, Rfl: 3  •  Multiple Vitamins-Minerals (multivitamin with minerals) tablet, Take 1 tablet by mouth daily, Disp: , Rfl:   •  nitroglycerin (NITROSTAT) 0 4 mg SL tablet, Place 1 tablet (0 4 mg total) under the tongue every 5 (five) minutes as needed for chest pain, Disp: 90 tablet, Rfl: 1  •  pantoprazole (PROTONIX) 40 mg tablet, TAKE 1 TABLET BY MOUTH DAILY AT BEDTIME, Disp: 90 tablet, Rfl: 1  •  Pomegranate, [place] gran[place], (POMEGRANATE PO), Take by mouth, Disp: , Rfl:   •  telmisartan (MICARDIS) 40 mg tablet, Take 1 tablet (40 mg total) by mouth daily, Disp: 90 tablet, Rfl: 3  •  TURMERIC PO, Take by mouth, Disp: , Rfl:   •  clotrimazole-betamethasone (LOTRISONE) 1-0 05 % cream, Apply topically 2 (two) times a day (Patient not taking: Reported on 8/18/2022), Disp: 45 g, Rfl: 1  •  fluticasone (FLONASE) 50 mcg/act nasal spray, 1 spray into each nostril 2 (two) times a day (Patient not taking: Reported on 8/18/2022), Disp: 48 g, Rfl: 3  •  ipratropium (ATROVENT) 0 03 % nasal spray, 1 spray into each nostril 3 (three) times a day (Patient not taking: Reported on 7/29/2021), Disp: 30 mL, Rfl: 11  •  ketoconazole (NIZORAL) 2 % cream, , Disp: , Rfl:   •  miconazole (MICOTIN) 2 % powder, Apply topically as needed for itching (Patient not taking: Reported on 8/18/2022), Disp: 70 g, Rfl: 5  •  mometasone (ELOCON) 0 1 % lotion, One drop affected ear canal daily at bedtime when necessary itching (Patient not taking: Reported on 8/18/2022), Disp: 30 mL, Rfl: 0  •  nystatin (MYCOSTATIN) cream, Apply topically 2 (two) times a day (Patient not taking: Reported on 8/18/2022), Disp: 150 g, Rfl: 2  •  nystatin (MYCOSTATIN) powder, Apply topically 3 (three) times a day (Patient not taking: Reported on 8/18/2022), Disp: 60 g, Rfl: 1  •  pimecrolimus (ELIDEL) 1 % cream, , Disp: , Rfl:   •  triamcinolone (KENALOG) 0 1 % cream, , Disp: , Rfl:   •  Zoster Vac Recomb Adjuvanted 50 MCG/0 5ML SUSR, Shingrix (PF) 50 mcg/0 5 mL intramuscular suspension, kit (Patient not taking: Reported on 10/27/2022), Disp: , Rfl:     The following portions of the patient's history were reviewed and updated as appropriate: allergies, current medications, past family history, past medical history, past social history, past surgical history and problem list     Review of Systems   Constitutional: Negative for appetite change, fatigue, fever and unexpected weight change  HENT: Negative for rhinorrhea, sinus pressure, sinus pain, sneezing and sore throat  Eyes: Negative for visual disturbance  Respiratory: Negative for cough, chest tightness, shortness of breath and wheezing  Cardiovascular: Negative for chest pain, palpitations and leg swelling  Gastrointestinal: Negative for abdominal distention, abdominal pain, blood in stool, constipation, diarrhea, nausea and vomiting  Endocrine: Negative for polydipsia and polyuria  Genitourinary: Negative for decreased urine volume, difficulty urinating, dysuria, hematuria and urgency  Musculoskeletal: Negative for arthralgias, back pain, joint swelling and neck pain  Skin: Negative for rash  Allergic/Immunologic: Negative for environmental allergies  Neurological: Negative for tremors, weakness, light-headedness, numbness and headaches  Hematological: Does not bruise/bleed easily  Psychiatric/Behavioral: Negative for agitation, behavioral problems, confusion and dysphoric mood  The patient is not nervous/anxious            Family History   Problem Relation Age of Onset   • Diabetes Mother    • Alcohol abuse Brother    • Bipolar disorder Brother    • Hypertension Brother    • Kidney disease Brother    • Heart disease Family    • Diabetes type II Family        Past Medical History:   Diagnosis Date   • Aortic valve regurgitation 01/26/2017   • Arthritis    • Cancer [facility])    • Coronary artery disease    • Coronary artery disease with angina pectoris ([facility] [address] ) 3/19/2019   • Edema 12/02/2016   • GERD (gastroesophageal reflux disease)    • Heart disease    • Heart valve disorder    • Heart valve replaced    • History of basal cell cancer    • HL (hearing loss)     Wears hearing aids   • Hx of tissue graft 03/29/2017   • Hyperlipemia 01/26/2017   • Hypertension    • Non-rheumatic mitral regurgitation 10/21/2016   • Obesity    • Right bundle branch block (RBBB), 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       Past Surgical History:   Procedure Laterality Date   • ADENOIDECTOMY     • APPENDECTOMY     • CARDIAC SURGERY     • CARDIAC VALVE REPLACEMENT     • EYE SURGERY     • HERNIA REPAIR     • TONSILLECTOMY     • VASECTOMY         Social History     Socioeconomic History   • Marital status: /Civil Union     Spouse name: None   • Number of children: 3   • Years of education: None   • Highest education level: None   Occupational History   • Occupation:  retired [employer]   Tobacco Use   • Smoking status: Former     Packs/day: 0 50     Types: Cigarettes, Cigars   • Smokeless tobacco: Former   • Tobacco comments:     quit 10 years ago, NEVER A SMOKER AS PER [employer]   Vaping Use   • Vaping Use: Never used   Substance and Sexual Activity   • Alcohol use: No   • Drug use: No   • Sexual activity: Not Currently     Partners: Female   Other Topics Concern   • None   Social History Narrative    PT states he does not drink any caffeine      Social Determinants of Health     Financial Resource Strain: Low Risk    • Difficulty of Paying Living Expenses: Not very hard   Food Insecurity: Not on file   Transportation Needs: No Transportation Needs   • Lack of Transportation (Medical): No   • Lack of Transportation (Non-Medical): No   Physical Activity: Not on file   Stress: Not on file   Social Connections: Not on file   Intimate Partner Violence: Not on file   Housing Stability: Not on file       Allergies   Allergen Reactions   • Orphenadrine GI Intolerance            Objective:    /58 (BP Location: Left arm, Patient Position: Sitting, Cuff Size: Large)   Pulse 66   Temp 98 °F (36 7 °C) (Tympanic)   Resp 16   Ht 5' 9" (1 753 m)   Wt 97 1 kg (214 lb)   SpO2 98%   BMI 31 60 kg/m²        Physical Exam  Constitutional:       General: He is not in acute distress  Appearance: He is well-developed  HENT:      Head: Normocephalic and atraumatic  Nose: Nose normal       Mouth/Throat:      Pharynx: No oropharyngeal exudate  Eyes:      General: No scleral icterus  Conjunctiva/sclera: Conjunctivae normal       Pupils: Pupils are equal, round, and reactive to light  Neck:      Thyroid: No thyromegaly  Vascular: No JVD  Trachea: No tracheal deviation  Cardiovascular:      Rate and Rhythm: Normal rate and regular rhythm  Heart sounds: Murmur heard  No friction rub  No gallop  Pulmonary:      Effort: Pulmonary effort is normal  No respiratory distress  Breath sounds: No wheezing or rales  Chest:      Chest wall: No tenderness  Abdominal:      General: Bowel sounds are normal  There is no distension  Palpations: Abdomen is soft  There is no mass  Tenderness: There is no abdominal tenderness  There is no guarding or rebound  Musculoskeletal:         General: No deformity  Normal range of motion  Cervical back: Normal range of motion and neck supple  Lymphadenopathy:      Cervical: No cervical adenopathy  Skin:     General: Skin is warm and dry  Findings: No rash  Neurological:      Mental Status: He is alert and oriented to person, place, and time  Cranial Nerves: No cranial nerve deficit  Motor: Weakness present  Coordination: Coordination normal       Gait: Gait abnormal    Psychiatric:         Behavior: Behavior normal          Thought Content:  Thought content normal          Judgment: Judgment normal

## 2022-12-09 ENCOUNTER — TELEPHONE (OUTPATIENT)
Dept: FAMILY MEDICINE CLINIC | Facility: CLINIC | Age: 87
End: 2022-12-09

## 2022-12-14 DIAGNOSIS — I10 HYPERTENSION, UNSPECIFIED TYPE: ICD-10-CM

## 2022-12-15 RX ORDER — HYDROXYZINE HYDROCHLORIDE 10 MG/1
10 TABLET, FILM COATED ORAL DAILY
Qty: 90 TABLET | Refills: 1 | Status: SHIPPED | OUTPATIENT
Start: 2022-12-15

## 2023-01-01 ENCOUNTER — HOSPICE ADMISSION (OUTPATIENT)
Dept: HOME HOSPICE | Facility: HOSPICE | Age: 88
End: 2023-01-01
Payer: MEDICARE

## 2023-01-01 ENCOUNTER — HOME CARE VISIT (OUTPATIENT)
Dept: HOME HEALTH SERVICES | Facility: HOME HEALTHCARE | Age: 88
End: 2023-01-01
Payer: MEDICARE

## 2023-01-01 ENCOUNTER — HOSPITAL ENCOUNTER (INPATIENT)
Facility: HOSPITAL | Age: 88
LOS: 3 days | End: 2023-07-14
Attending: EMERGENCY MEDICINE | Admitting: INTERNAL MEDICINE
Payer: MEDICARE

## 2023-01-01 ENCOUNTER — APPOINTMENT (EMERGENCY)
Dept: CT IMAGING | Facility: HOSPITAL | Age: 88
End: 2023-01-01
Payer: MEDICARE

## 2023-01-01 VITALS
RESPIRATION RATE: 20 BRPM | HEART RATE: 82 BPM | BODY MASS INDEX: 32.33 KG/M2 | SYSTOLIC BLOOD PRESSURE: 98 MMHG | TEMPERATURE: 99.9 F | WEIGHT: 218.26 LBS | HEIGHT: 69 IN | OXYGEN SATURATION: 95 % | DIASTOLIC BLOOD PRESSURE: 41 MMHG

## 2023-01-01 DIAGNOSIS — G21.4 VASCULAR PARKINSONISM (HCC): ICD-10-CM

## 2023-01-01 DIAGNOSIS — K55.9 LARGE BOWEL ISCHEMIA (HCC): Primary | ICD-10-CM

## 2023-01-01 DIAGNOSIS — R10.9 ABDOMINAL PAIN: ICD-10-CM

## 2023-01-01 DIAGNOSIS — K52.9 COLITIS: ICD-10-CM

## 2023-01-01 LAB
ALBUMIN SERPL BCP-MCNC: 3.5 G/DL (ref 3.5–5)
ALP SERPL-CCNC: 91 U/L (ref 34–104)
ALT SERPL W P-5'-P-CCNC: 27 U/L (ref 7–52)
ANION GAP SERPL CALCULATED.3IONS-SCNC: 12 MMOL/L
AST SERPL W P-5'-P-CCNC: 32 U/L (ref 13–39)
ATRIAL RATE: 65 BPM
BASOPHILS # BLD AUTO: 0.04 THOUSANDS/ÂΜL (ref 0–0.1)
BASOPHILS NFR BLD AUTO: 0 % (ref 0–1)
BILIRUB SERPL-MCNC: 0.74 MG/DL (ref 0.2–1)
BUN SERPL-MCNC: 20 MG/DL (ref 5–25)
CALCIUM SERPL-MCNC: 8.7 MG/DL (ref 8.4–10.2)
CHLORIDE SERPL-SCNC: 101 MMOL/L (ref 96–108)
CO2 SERPL-SCNC: 20 MMOL/L (ref 21–32)
CREAT SERPL-MCNC: 1.24 MG/DL (ref 0.6–1.3)
EOSINOPHIL # BLD AUTO: 0.04 THOUSAND/ÂΜL (ref 0–0.61)
EOSINOPHIL NFR BLD AUTO: 0 % (ref 0–6)
ERYTHROCYTE [DISTWIDTH] IN BLOOD BY AUTOMATED COUNT: 13.2 % (ref 11.6–15.1)
GFR SERPL CREATININE-BSD FRML MDRD: 52 ML/MIN/1.73SQ M
GLUCOSE SERPL-MCNC: 172 MG/DL (ref 65–140)
GLUCOSE SERPL-MCNC: 175 MG/DL (ref 65–140)
HCT VFR BLD AUTO: 36.2 % (ref 36.5–49.3)
HGB BLD-MCNC: 12 G/DL (ref 12–17)
IMM GRANULOCYTES # BLD AUTO: 0.04 THOUSAND/UL (ref 0–0.2)
IMM GRANULOCYTES NFR BLD AUTO: 0 % (ref 0–2)
LACTATE SERPL-SCNC: 1.7 MMOL/L (ref 0.5–2)
LACTATE SERPL-SCNC: 2.4 MMOL/L (ref 0.5–2)
LIPASE SERPL-CCNC: 29 U/L (ref 11–82)
LYMPHOCYTES # BLD AUTO: 1.39 THOUSANDS/ÂΜL (ref 0.6–4.47)
LYMPHOCYTES NFR BLD AUTO: 11 % (ref 14–44)
MCH RBC QN AUTO: 32.1 PG (ref 26.8–34.3)
MCHC RBC AUTO-ENTMCNC: 33.1 G/DL (ref 31.4–37.4)
MCV RBC AUTO: 97 FL (ref 82–98)
MONOCYTES # BLD AUTO: 0.89 THOUSAND/ÂΜL (ref 0.17–1.22)
MONOCYTES NFR BLD AUTO: 7 % (ref 4–12)
NEUTROPHILS # BLD AUTO: 10.3 THOUSANDS/ÂΜL (ref 1.85–7.62)
NEUTS SEG NFR BLD AUTO: 82 % (ref 43–75)
NRBC BLD AUTO-RTO: 0 /100 WBCS
P AXIS: 46 DEGREES
PLATELET # BLD AUTO: 149 THOUSANDS/UL (ref 149–390)
PMV BLD AUTO: 10.2 FL (ref 8.9–12.7)
POTASSIUM SERPL-SCNC: 4.3 MMOL/L (ref 3.5–5.3)
PR INTERVAL: 340 MS
PROCALCITONIN SERPL-MCNC: 0.05 NG/ML
PROT SERPL-MCNC: 6.8 G/DL (ref 6.4–8.4)
QRS AXIS: 40 DEGREES
QRSD INTERVAL: 154 MS
QT INTERVAL: 484 MS
QTC INTERVAL: 503 MS
RBC # BLD AUTO: 3.74 MILLION/UL (ref 3.88–5.62)
SODIUM SERPL-SCNC: 133 MMOL/L (ref 135–147)
T WAVE AXIS: 168 DEGREES
VENTRICULAR RATE: 65 BPM
WBC # BLD AUTO: 12.7 THOUSAND/UL (ref 4.31–10.16)

## 2023-01-01 PROCEDURE — 93005 ELECTROCARDIOGRAM TRACING: CPT

## 2023-01-01 PROCEDURE — NC001 PR NO CHARGE: Performed by: SURGERY

## 2023-01-01 PROCEDURE — 99233 SBSQ HOSP IP/OBS HIGH 50: CPT | Performed by: INTERNAL MEDICINE

## 2023-01-01 PROCEDURE — 99232 SBSQ HOSP IP/OBS MODERATE 35: CPT | Performed by: HOSPITALIST

## 2023-01-01 PROCEDURE — 99285 EMERGENCY DEPT VISIT HI MDM: CPT

## 2023-01-01 PROCEDURE — 74177 CT ABD & PELVIS W/CONTRAST: CPT

## 2023-01-01 PROCEDURE — 84145 PROCALCITONIN (PCT): CPT | Performed by: PHYSICIAN ASSISTANT

## 2023-01-01 PROCEDURE — 99239 HOSP IP/OBS DSCHRG MGMT >30: CPT | Performed by: HOSPITALIST

## 2023-01-01 PROCEDURE — 99285 EMERGENCY DEPT VISIT HI MDM: CPT | Performed by: EMERGENCY MEDICINE

## 2023-01-01 PROCEDURE — 96367 TX/PROPH/DG ADDL SEQ IV INF: CPT

## 2023-01-01 PROCEDURE — 80053 COMPREHEN METABOLIC PANEL: CPT

## 2023-01-01 PROCEDURE — 83605 ASSAY OF LACTIC ACID: CPT

## 2023-01-01 PROCEDURE — G1004 CDSM NDSC: HCPCS

## 2023-01-01 PROCEDURE — 96365 THER/PROPH/DIAG IV INF INIT: CPT

## 2023-01-01 PROCEDURE — 99232 SBSQ HOSP IP/OBS MODERATE 35: CPT | Performed by: INTERNAL MEDICINE

## 2023-01-01 PROCEDURE — 10330063 VN DURABLE MEDICAL EQUIPMENT, SUPPLIES/MEDS

## 2023-01-01 PROCEDURE — 87040 BLOOD CULTURE FOR BACTERIA: CPT

## 2023-01-01 PROCEDURE — 99223 1ST HOSP IP/OBS HIGH 75: CPT | Performed by: HOSPITALIST

## 2023-01-01 PROCEDURE — 83690 ASSAY OF LIPASE: CPT

## 2023-01-01 PROCEDURE — 82948 REAGENT STRIP/BLOOD GLUCOSE: CPT

## 2023-01-01 PROCEDURE — 96375 TX/PRO/DX INJ NEW DRUG ADDON: CPT

## 2023-01-01 PROCEDURE — 36415 COLL VENOUS BLD VENIPUNCTURE: CPT

## 2023-01-01 PROCEDURE — 93010 ELECTROCARDIOGRAM REPORT: CPT | Performed by: INTERNAL MEDICINE

## 2023-01-01 PROCEDURE — 85025 COMPLETE CBC W/AUTO DIFF WBC: CPT

## 2023-01-01 PROCEDURE — 99497 ADVNCD CARE PLAN 30 MIN: CPT | Performed by: HOSPITALIST

## 2023-01-01 RX ORDER — HYDROMORPHONE HCL IN WATER/PF 6 MG/30 ML
0.2 PATIENT CONTROLLED ANALGESIA SYRINGE INTRAVENOUS
Status: DISCONTINUED | OUTPATIENT
Start: 2023-01-01 | End: 2023-01-01

## 2023-01-01 RX ORDER — SODIUM CHLORIDE, SODIUM GLUCONATE, SODIUM ACETATE, POTASSIUM CHLORIDE, MAGNESIUM CHLORIDE, SODIUM PHOSPHATE, DIBASIC, AND POTASSIUM PHOSPHATE .53; .5; .37; .037; .03; .012; .00082 G/100ML; G/100ML; G/100ML; G/100ML; G/100ML; G/100ML; G/100ML
500 INJECTION, SOLUTION INTRAVENOUS ONCE
Status: COMPLETED | OUTPATIENT
Start: 2023-01-01 | End: 2023-01-01

## 2023-01-01 RX ORDER — CEFEPIME HYDROCHLORIDE 2 G/50ML
2000 INJECTION, SOLUTION INTRAVENOUS ONCE
Status: COMPLETED | OUTPATIENT
Start: 2023-01-01 | End: 2023-01-01

## 2023-01-01 RX ORDER — KETOROLAC TROMETHAMINE 30 MG/ML
1 INJECTION, SOLUTION INTRAMUSCULAR; INTRAVENOUS ONCE
Status: COMPLETED | OUTPATIENT
Start: 2023-01-01 | End: 2023-01-01

## 2023-01-01 RX ORDER — SODIUM CHLORIDE, SODIUM GLUCONATE, SODIUM ACETATE, POTASSIUM CHLORIDE, MAGNESIUM CHLORIDE, SODIUM PHOSPHATE, DIBASIC, AND POTASSIUM PHOSPHATE .53; .5; .37; .037; .03; .012; .00082 G/100ML; G/100ML; G/100ML; G/100ML; G/100ML; G/100ML; G/100ML
50 INJECTION, SOLUTION INTRAVENOUS CONTINUOUS
Status: DISCONTINUED | OUTPATIENT
Start: 2023-01-01 | End: 2023-01-01 | Stop reason: HOSPADM

## 2023-01-01 RX ORDER — ONDANSETRON 2 MG/ML
1 INJECTION INTRAMUSCULAR; INTRAVENOUS ONCE
Status: COMPLETED | OUTPATIENT
Start: 2023-01-01 | End: 2023-01-01

## 2023-01-01 RX ORDER — LORAZEPAM 2 MG/ML
0.5 INJECTION INTRAMUSCULAR EVERY 2 HOUR PRN
Status: DISCONTINUED | OUTPATIENT
Start: 2023-01-01 | End: 2023-01-01 | Stop reason: HOSPADM

## 2023-01-01 RX ORDER — HYDROMORPHONE HCL/PF 1 MG/ML
0.5 SYRINGE (ML) INJECTION ONCE
Status: COMPLETED | OUTPATIENT
Start: 2023-01-01 | End: 2023-01-01

## 2023-01-01 RX ORDER — HYDROMORPHONE HCL IN WATER/PF 6 MG/30 ML
0.2 PATIENT CONTROLLED ANALGESIA SYRINGE INTRAVENOUS EVERY 4 HOURS PRN
Status: DISCONTINUED | OUTPATIENT
Start: 2023-01-01 | End: 2023-01-01

## 2023-01-01 RX ORDER — MORPHINE SULFATE 4 MG/ML
4 INJECTION, SOLUTION INTRAMUSCULAR; INTRAVENOUS EVERY 2 HOUR PRN
Status: DISCONTINUED | OUTPATIENT
Start: 2023-01-01 | End: 2023-01-01

## 2023-01-01 RX ORDER — METRONIDAZOLE 500 MG/100ML
500 INJECTION, SOLUTION INTRAVENOUS EVERY 8 HOURS
Status: DISCONTINUED | OUTPATIENT
Start: 2023-01-01 | End: 2023-01-01

## 2023-01-01 RX ORDER — MORPHINE SULFATE 4 MG/ML
4 INJECTION, SOLUTION INTRAMUSCULAR; INTRAVENOUS EVERY 4 HOURS PRN
Status: DISCONTINUED | OUTPATIENT
Start: 2023-01-01 | End: 2023-01-01

## 2023-01-01 RX ORDER — HYDROMORPHONE HCL/PF 1 MG/ML
0.5 SYRINGE (ML) INJECTION
Status: DISCONTINUED | OUTPATIENT
Start: 2023-01-01 | End: 2023-01-01 | Stop reason: HOSPADM

## 2023-01-01 RX ORDER — INSULIN LISPRO 100 [IU]/ML
1-5 INJECTION, SOLUTION INTRAVENOUS; SUBCUTANEOUS EVERY 6 HOURS
Status: DISCONTINUED | OUTPATIENT
Start: 2023-01-01 | End: 2023-01-01

## 2023-01-01 RX ORDER — BISACODYL 10 MG
10 SUPPOSITORY, RECTAL RECTAL DAILY PRN
Status: DISCONTINUED | OUTPATIENT
Start: 2023-01-01 | End: 2023-01-01 | Stop reason: HOSPADM

## 2023-01-01 RX ORDER — ONDANSETRON 2 MG/ML
4 INJECTION INTRAMUSCULAR; INTRAVENOUS EVERY 6 HOURS PRN
Status: DISCONTINUED | OUTPATIENT
Start: 2023-01-01 | End: 2023-01-01 | Stop reason: HOSPADM

## 2023-01-01 RX ORDER — HALOPERIDOL 5 MG/ML
0.5 INJECTION INTRAMUSCULAR EVERY 2 HOUR PRN
Status: DISCONTINUED | OUTPATIENT
Start: 2023-01-01 | End: 2023-01-01 | Stop reason: HOSPADM

## 2023-01-01 RX ORDER — GLYCOPYRROLATE 0.2 MG/ML
0.1 INJECTION INTRAMUSCULAR; INTRAVENOUS
Status: DISCONTINUED | OUTPATIENT
Start: 2023-01-01 | End: 2023-01-01 | Stop reason: HOSPADM

## 2023-01-01 RX ADMIN — LORAZEPAM 0.5 MG: 2 INJECTION INTRAMUSCULAR; INTRAVENOUS at 09:37

## 2023-01-01 RX ADMIN — HYDROMORPHONE HYDROCHLORIDE 0.2 MG: 0.2 INJECTION, SOLUTION INTRAMUSCULAR; INTRAVENOUS; SUBCUTANEOUS at 10:58

## 2023-01-01 RX ADMIN — MORPHINE SULFATE 4 MG: 4 INJECTION INTRAVENOUS at 11:55

## 2023-01-01 RX ADMIN — SODIUM CHLORIDE, SODIUM GLUCONATE, SODIUM ACETATE, POTASSIUM CHLORIDE, MAGNESIUM CHLORIDE, SODIUM PHOSPHATE, DIBASIC, AND POTASSIUM PHOSPHATE 50 ML/HR: .53; .5; .37; .037; .03; .012; .00082 INJECTION, SOLUTION INTRAVENOUS at 09:08

## 2023-01-01 RX ADMIN — IOHEXOL 100 ML: 350 INJECTION, SOLUTION INTRAVENOUS at 02:36

## 2023-01-01 RX ADMIN — HALOPERIDOL LACTATE 0.5 MG: 5 INJECTION, SOLUTION INTRAMUSCULAR at 11:17

## 2023-01-01 RX ADMIN — GLYCOPYRROLATE 0.1 MG: 0.2 INJECTION, SOLUTION INTRAMUSCULAR; INTRAVENOUS at 06:26

## 2023-01-01 RX ADMIN — GLYCOPYRROLATE 0.1 MG: 0.2 INJECTION, SOLUTION INTRAMUSCULAR; INTRAVENOUS at 08:57

## 2023-01-01 RX ADMIN — Medication 0.5 MG/HR: at 14:40

## 2023-01-01 RX ADMIN — HYDROMORPHONE HYDROCHLORIDE 0.5 MG: 1 INJECTION, SOLUTION INTRAMUSCULAR; INTRAVENOUS; SUBCUTANEOUS at 14:40

## 2023-01-01 RX ADMIN — LORAZEPAM 0.5 MG: 2 INJECTION INTRAMUSCULAR; INTRAVENOUS at 14:43

## 2023-01-01 RX ADMIN — METRONIDAZOLE 500 MG: 500 INJECTION, SOLUTION INTRAVENOUS at 11:26

## 2023-01-01 RX ADMIN — HYDROMORPHONE HYDROCHLORIDE 0.5 MG: 1 INJECTION, SOLUTION INTRAMUSCULAR; INTRAVENOUS; SUBCUTANEOUS at 01:47

## 2023-01-01 RX ADMIN — METRONIDAZOLE 500 MG: 500 INJECTION, SOLUTION INTRAVENOUS at 11:17

## 2023-01-01 RX ADMIN — SODIUM CHLORIDE, SODIUM GLUCONATE, SODIUM ACETATE, POTASSIUM CHLORIDE, MAGNESIUM CHLORIDE, SODIUM PHOSPHATE, DIBASIC, AND POTASSIUM PHOSPHATE 500 ML: .53; .5; .37; .037; .03; .012; .00082 INJECTION, SOLUTION INTRAVENOUS at 04:05

## 2023-01-01 RX ADMIN — METRONIDAZOLE 500 MG: 500 INJECTION, SOLUTION INTRAVENOUS at 03:34

## 2023-01-01 RX ADMIN — METRONIDAZOLE 500 MG: 500 INJECTION, SOLUTION INTRAVENOUS at 05:24

## 2023-01-01 RX ADMIN — HALOPERIDOL LACTATE 0.5 MG: 5 INJECTION, SOLUTION INTRAMUSCULAR at 09:16

## 2023-01-01 RX ADMIN — HYDROMORPHONE HYDROCHLORIDE 0.5 MG: 1 INJECTION, SOLUTION INTRAMUSCULAR; INTRAVENOUS; SUBCUTANEOUS at 04:35

## 2023-01-01 RX ADMIN — Medication 0.5 MG/HR: at 09:09

## 2023-01-01 RX ADMIN — SODIUM CHLORIDE, SODIUM GLUCONATE, SODIUM ACETATE, POTASSIUM CHLORIDE, MAGNESIUM CHLORIDE, SODIUM PHOSPHATE, DIBASIC, AND POTASSIUM PHOSPHATE 50 ML/HR: .53; .5; .37; .037; .03; .012; .00082 INJECTION, SOLUTION INTRAVENOUS at 06:01

## 2023-01-01 RX ADMIN — CEFEPIME HYDROCHLORIDE 2000 MG: 2 INJECTION, SOLUTION INTRAVENOUS at 03:06

## 2023-01-01 RX ADMIN — METRONIDAZOLE 500 MG: 500 INJECTION, SOLUTION INTRAVENOUS at 21:26

## 2023-01-30 ENCOUNTER — APPOINTMENT (OUTPATIENT)
Dept: LAB | Age: 88
End: 2023-01-30

## 2023-01-30 DIAGNOSIS — Z79.01 CURRENT USE OF LONG TERM ANTICOAGULATION: ICD-10-CM

## 2023-01-30 DIAGNOSIS — E11.42 DM TYPE 2 WITH DIABETIC PERIPHERAL NEUROPATHY (HCC): ICD-10-CM

## 2023-01-30 DIAGNOSIS — N18.31 STAGE 3A CHRONIC KIDNEY DISEASE (HCC): ICD-10-CM

## 2023-01-30 DIAGNOSIS — I50.32 CHRONIC DIASTOLIC CONGESTIVE HEART FAILURE, NYHA CLASS 2 (HCC): ICD-10-CM

## 2023-01-30 DIAGNOSIS — Z00.00 MEDICARE ANNUAL WELLNESS VISIT, SUBSEQUENT: ICD-10-CM

## 2023-01-30 LAB
ALBUMIN SERPL BCP-MCNC: 3.1 G/DL (ref 3.5–5)
ALP SERPL-CCNC: 96 U/L (ref 46–116)
ALT SERPL W P-5'-P-CCNC: 30 U/L (ref 12–78)
ANION GAP SERPL CALCULATED.3IONS-SCNC: 9 MMOL/L (ref 4–13)
AST SERPL W P-5'-P-CCNC: 30 U/L (ref 5–45)
BASOPHILS # BLD AUTO: 0.04 THOUSANDS/ÂΜL (ref 0–0.1)
BASOPHILS NFR BLD AUTO: 0 % (ref 0–1)
BILIRUB SERPL-MCNC: 0.46 MG/DL (ref 0.2–1)
BUN SERPL-MCNC: 25 MG/DL (ref 5–25)
CALCIUM ALBUM COR SERPL-MCNC: 10.1 MG/DL (ref 8.3–10.1)
CALCIUM SERPL-MCNC: 9.4 MG/DL (ref 8.3–10.1)
CHLORIDE SERPL-SCNC: 101 MMOL/L (ref 96–108)
CO2 SERPL-SCNC: 26 MMOL/L (ref 21–32)
CREAT SERPL-MCNC: 1.1 MG/DL (ref 0.6–1.3)
EOSINOPHIL # BLD AUTO: 0.28 THOUSAND/ÂΜL (ref 0–0.61)
EOSINOPHIL NFR BLD AUTO: 3 % (ref 0–6)
ERYTHROCYTE [DISTWIDTH] IN BLOOD BY AUTOMATED COUNT: 12.7 % (ref 11.6–15.1)
GFR SERPL CREATININE-BSD FRML MDRD: 60 ML/MIN/1.73SQ M
GLUCOSE P FAST SERPL-MCNC: 137 MG/DL (ref 65–99)
HCT VFR BLD AUTO: 36.3 % (ref 36.5–49.3)
HGB BLD-MCNC: 12.1 G/DL (ref 12–17)
IMM GRANULOCYTES # BLD AUTO: 0.05 THOUSAND/UL (ref 0–0.2)
IMM GRANULOCYTES NFR BLD AUTO: 1 % (ref 0–2)
LYMPHOCYTES # BLD AUTO: 1.86 THOUSANDS/ÂΜL (ref 0.6–4.47)
LYMPHOCYTES NFR BLD AUTO: 20 % (ref 14–44)
MCH RBC QN AUTO: 33.2 PG (ref 26.8–34.3)
MCHC RBC AUTO-ENTMCNC: 33.3 G/DL (ref 31.4–37.4)
MCV RBC AUTO: 100 FL (ref 82–98)
MONOCYTES # BLD AUTO: 0.71 THOUSAND/ÂΜL (ref 0.17–1.22)
MONOCYTES NFR BLD AUTO: 7 % (ref 4–12)
NEUTROPHILS # BLD AUTO: 6.62 THOUSANDS/ÂΜL (ref 1.85–7.62)
NEUTS SEG NFR BLD AUTO: 69 % (ref 43–75)
NRBC BLD AUTO-RTO: 0 /100 WBCS
PLATELET # BLD AUTO: 195 THOUSANDS/UL (ref 149–390)
PMV BLD AUTO: 10.5 FL (ref 8.9–12.7)
POTASSIUM SERPL-SCNC: 4.4 MMOL/L (ref 3.5–5.3)
PROT SERPL-MCNC: 7.1 G/DL (ref 6.4–8.4)
RBC # BLD AUTO: 3.64 MILLION/UL (ref 3.88–5.62)
SODIUM SERPL-SCNC: 136 MMOL/L (ref 135–147)
WBC # BLD AUTO: 9.56 THOUSAND/UL (ref 4.31–10.16)

## 2023-02-09 ENCOUNTER — OFFICE VISIT (OUTPATIENT)
Dept: FAMILY MEDICINE CLINIC | Facility: CLINIC | Age: 88
End: 2023-02-09

## 2023-02-09 ENCOUNTER — TELEPHONE (OUTPATIENT)
Dept: FAMILY MEDICINE CLINIC | Facility: CLINIC | Age: 88
End: 2023-02-09

## 2023-02-09 VITALS
OXYGEN SATURATION: 97 % | HEIGHT: 69 IN | TEMPERATURE: 97.8 F | HEART RATE: 79 BPM | DIASTOLIC BLOOD PRESSURE: 58 MMHG | WEIGHT: 210.6 LBS | RESPIRATION RATE: 16 BRPM | SYSTOLIC BLOOD PRESSURE: 114 MMHG | BODY MASS INDEX: 31.19 KG/M2

## 2023-02-09 DIAGNOSIS — N18.31 STAGE 3A CHRONIC KIDNEY DISEASE (HCC): ICD-10-CM

## 2023-02-09 DIAGNOSIS — G21.4 VASCULAR PARKINSONISM (HCC): ICD-10-CM

## 2023-02-09 DIAGNOSIS — R60.9 EDEMA, UNSPECIFIED TYPE: ICD-10-CM

## 2023-02-09 DIAGNOSIS — L89.326 PRESSURE INJURY OF DEEP TISSUE OF LEFT BUTTOCK: ICD-10-CM

## 2023-02-09 DIAGNOSIS — I70.25 ATHEROSCLEROSIS OF NATIVE ARTERIES OF THE EXTREMITIES WITH ULCERATION (HCC): ICD-10-CM

## 2023-02-09 DIAGNOSIS — E87.1 HYPONATREMIA WITH EXCESS EXTRACELLULAR FLUID VOLUME: ICD-10-CM

## 2023-02-09 DIAGNOSIS — I50.32 CHRONIC DIASTOLIC CONGESTIVE HEART FAILURE, NYHA CLASS 2 (HCC): ICD-10-CM

## 2023-02-09 DIAGNOSIS — I34.0 NON-RHEUMATIC MITRAL REGURGITATION: ICD-10-CM

## 2023-02-09 DIAGNOSIS — Z95.4 HISTORY OF HEART VALVE REPLACEMENT WITH TISSUE GRAFT: ICD-10-CM

## 2023-02-09 DIAGNOSIS — R26.2 AMBULATORY DYSFUNCTION: ICD-10-CM

## 2023-02-09 DIAGNOSIS — R41.89 COGNITIVE DECLINE: ICD-10-CM

## 2023-02-09 DIAGNOSIS — I10 ESSENTIAL HYPERTENSION: ICD-10-CM

## 2023-02-09 DIAGNOSIS — Z95.0 PACEMAKER: ICD-10-CM

## 2023-02-09 DIAGNOSIS — I25.10 MULTIPLE VESSEL CORONARY ARTERY DISEASE: ICD-10-CM

## 2023-02-09 DIAGNOSIS — I70.203 ATHEROSCLEROSIS OF NATIVE ARTERY OF BOTH LOWER EXTREMITIES, WITH UNSPECIFIED PRESENCE OF CLINICAL MANIFESTATION (HCC): ICD-10-CM

## 2023-02-09 DIAGNOSIS — K21.00 GERD WITH ESOPHAGITIS: ICD-10-CM

## 2023-02-09 DIAGNOSIS — T82.897D AORTIC PROSTHETIC VALVE REGURGITATION, SUBSEQUENT ENCOUNTER: ICD-10-CM

## 2023-02-09 DIAGNOSIS — E78.00 PURE HYPERCHOLESTEROLEMIA: ICD-10-CM

## 2023-02-09 DIAGNOSIS — I45.2 RIGHT BUNDLE BRANCH BLOCK WITH LEFT ANTERIOR FASCICULAR BLOCK: ICD-10-CM

## 2023-02-09 DIAGNOSIS — E11.42 DM TYPE 2 WITH DIABETIC PERIPHERAL NEUROPATHY (HCC): Primary | ICD-10-CM

## 2023-02-09 DIAGNOSIS — F33.9 DEPRESSION, RECURRENT (HCC): ICD-10-CM

## 2023-02-09 DIAGNOSIS — I48.0 PAROXYSMAL ATRIAL FIBRILLATION (HCC): ICD-10-CM

## 2023-02-09 RX ORDER — PANTOPRAZOLE SODIUM 40 MG/1
40 TABLET, DELAYED RELEASE ORAL
Qty: 90 TABLET | Refills: 1 | Status: SHIPPED | OUTPATIENT
Start: 2023-02-09

## 2023-02-09 NOTE — PROGRESS NOTES
Assess{:    This 80-year-old male is here for the following:    Chronic congestive heart failure is stable with no significant edema  Laboratory studies are stable  He recently saw cardiology and they also felt his condition was stable  Patient's early pressure sore on his buttock is now covered in the wife's putting cream on it twice a day as the dermatologist suggested  She will continue with triple paste twice a day and get a doughnut for him to sit on to see if will help relieve the pressure  Now on Eliquis because of paroxysmal A-fib  Aspirin stopped  Diet reviewed  Lifestyle modifications reviewed  Medications reviewed and ordered  Laboratory tests and studies reviewed and ordered  All patient's questions answered to patient satisfaction  Chief Complaint   Patient presents with   • Follow-up     Routine  Diagnoses and all orders for this visit:    DM type 2 with diabetic peripheral neuropathy ([facility] [address] )    Aortic prosthetic valve regurgitation, subsequent encounter    Atherosclerosis of native artery of both lower extremities, with unspecified presence of clinical manifestation (HCC)    Chronic diastolic congestive heart failure, NYHA class 2 ([facility])    Essential hypertension    Multiple vessel coronary artery disease    Non-rheumatic mitral regurgitation    Paroxysmal atrial fibrillation ([facility])    Right bundle branch block with left anterior fascicular block    Vascular parkinsonism ([facility])    Stage 3a chronic kidney disease ([address] )    Ambulatory dysfunction    Cognitive decline    Edema, unspecified type    History of heart valve replacement with tissue graft    Pure hypercholesterolemia    Hyponatremia with excess extracellular fluid volume    Pacemaker        Subjective: This 80-year-old male is here for the following:    Chronic congestive heart failure is stable with no significant edema  Laboratory studies are stable    He recently saw cardiology and they also felt his condition was stable  Patient's early pressure sore on his buttock is now covered in the wife's putting cream on it twice a day as the dermatologist suggested  She will continue with triple paste twice a day and get a doughnut for him to sit on to see if will help relieve the pressure  Now on Eliquis because of paroxysmal A-fib  Aspirin stopped       Patient ID: Donal Alfaro is a 81 y o  male          Current Outpatient Medications:   •  allopurinol (ZYLOPRIM) 100 mg tablet, Take 1 tablet (100 mg total) by mouth daily, Disp: 90 tablet, Rfl: 3  •  amLODIPine (NORVASC) 2 5 mg tablet, Take 1 tablet (2 5 mg total) by mouth daily, Disp: 90 tablet, Rfl: 3  •  ascorbic acid (VITAMIN C) 500 MG tablet, Take 500 mg by mouth daily, Disp: , Rfl:   •  atorvastatin (LIPITOR) 20 mg tablet, Take 1 tablet (20 mg total) by mouth daily, Disp: 90 tablet, Rfl: 1  •  Blood Glucose Monitoring Suppl (ONE TOUCH ULTRA 2) w/Device KIT, Use daily To test blood sugar 1x daily DX:diabetes, Disp: 1 kit, Rfl: 0  •  bumetanide (BUMEX) 1 mg tablet, Take 1 tablet (1 mg total) by mouth daily, Disp: 90 tablet, Rfl: 2  •  carbidopa-levodopa (SINEMET)  mg per tablet, carbidopa 25 mg-levodopa 100 mg tablet  TAKE 1 TABLET BY MOUTH THREE TIMES A DAY, Disp: , Rfl:   •  clotrimazole-betamethasone (LOTRISONE) 1-0 05 % cream, Apply topically 2 (two) times a day, Disp: 45 g, Rfl: 1  •  doxazosin (CARDURA) 4 mg tablet, Take 1 tablet (4 mg total) by mouth daily at bedtime, Disp: 90 tablet, Rfl: 3  •  Eliquis 5 MG, Take 1 tablet (5 mg total) by mouth 2 (two) times a day, Disp: 180 tablet, Rfl: 0  •  fluticasone (FLONASE) 50 mcg/act nasal spray, 1 spray into each nostril 2 (two) times a day, Disp: 48 g, Rfl: 3  •  glucose blood (OneTouch Ultra) test strip, Use 1 each daily as needed TEST TWICE DAILY AS NEEDED, Disp: , Rfl:   •  glucose blood (OneTouch Ultra) test strip, Use 1 each daily Test blood sugar, Disp: 100 strip, Rfl: 6  •  glucose blood test strip, OneTouch Ultra Test strips, Disp: , Rfl:   •  glucose blood test strip, OneTouch Ultra Test strips  USE 1 EACH DAILY TO TEST BLOOD SUGAR, Disp: , Rfl:   •  hydrOXYzine HCL (ATARAX) 10 mg tablet, TAKE 1 TABLET (10 MG TOTAL) BY MOUTH IN THE MORNING, Disp: 90 tablet, Rfl: 1  •  ketoconazole (NIZORAL) 2 % cream, , Disp: , Rfl:   •  Lancets (OneTouch Delica Plus [id number]) MISC, USE TO TEST BLOOD SUGARS ONCE DAILY, Disp: 100 each, Rfl: 6  •  Lancets (OneTouch Delica Plus [id number]) MISC, Use 1 each in the morning Test once daily for diabetes  , Disp: 100 each, Rfl: 3  •  metFORMIN (GLUCOPHAGE-XR) 500 mg 24 hr tablet, Take 1 tablet (500 mg total) by mouth daily with dinner, Disp: 90 tablet, Rfl: 3  •  miconazole (MICOTIN) 2 % powder, Apply topically as needed for itching, Disp: 70 g, Rfl: 5  •  mometasone (ELOCON) 0 1 % lotion, One drop affected ear canal daily at bedtime when necessary itching, Disp: 30 mL, Rfl: 0  •  Multiple Vitamins-Minerals (multivitamin with minerals) tablet, Take 1 tablet by mouth daily, Disp: , Rfl:   •  nitroglycerin (NITROSTAT) 0 4 mg SL tablet, Place 1 tablet (0 4 mg total) under the tongue every 5 (five) minutes as needed for chest pain, Disp: 90 tablet, Rfl: 1  •  nystatin (MYCOSTATIN) cream, Apply topically 2 (two) times a day, Disp: 150 g, Rfl: 2  •  nystatin (MYCOSTATIN) powder, Apply topically 3 (three) times a day, Disp: 60 g, Rfl: 1  •  pimecrolimus (ELIDEL) 1 % cream, , Disp: , Rfl:   •  Pomegranate, [place] granatum, (POMEGRANATE PO), Take by mouth, Disp: , Rfl:   •  telmisartan (MICARDIS) 40 mg tablet, Take 1 tablet (40 mg total) by mouth daily, Disp: 90 tablet, Rfl: 3  •  triamcinolone (KENALOG) 0 1 % cream, , Disp: , Rfl:   •  TURMERIC PO, Take by mouth, Disp: , Rfl:   •  Zoster Vac Recomb Adjuvanted 50 MCG/0 5ML SUSR, , Disp: , Rfl:   •  BABY ASPIRIN PO, Take 81 mg by mouth daily (Patient not taking: Reported on 1/27/2023), Disp: , Rfl:   •  ipratropium (ATROVENT) 0 03 % nasal spray, 1 spray into each nostril 3 (three) times a day (Patient not taking: Reported on 7/29/2021), Disp: 30 mL, Rfl: 11  •  pantoprazole (PROTONIX) 40 mg tablet, Take 1 tablet (40 mg total) by mouth daily at bedtime, Disp: 90 tablet, Rfl: 1    The following portions of the patient's history were reviewed and updated as appropriate: allergies, current medications, past family history, past medical history, past social history, past surgical history and problem list     Review of Systems   Constitutional: Negative for appetite change, fatigue, fever and unexpected weight change  HENT: Negative for rhinorrhea, sinus pressure, sinus pain, sneezing and sore throat  Eyes: Negative for visual disturbance  Respiratory: Negative for cough, chest tightness, shortness of breath and wheezing  Cardiovascular: Negative for chest pain, palpitations and leg swelling  Gastrointestinal: Negative for abdominal distention, abdominal pain, blood in stool, constipation, diarrhea, nausea and vomiting  Endocrine: Negative for polydipsia and polyuria  Genitourinary: Negative for decreased urine volume, difficulty urinating, dysuria, hematuria and urgency  Musculoskeletal: Positive for gait problem  Negative for arthralgias, back pain, joint swelling and neck pain  Skin: Negative for rash  Allergic/Immunologic: Negative for environmental allergies  Neurological: Positive for tremors  Negative for weakness, light-headedness, numbness and headaches  Hematological: Does not bruise/bleed easily  Psychiatric/Behavioral: Negative for agitation, behavioral problems, confusion and dysphoric mood  The patient is nervous/anxious            Family History   Problem Relation Age of Onset   • Diabetes Mother    • Alcohol abuse Brother    • Bipolar disorder Brother    • Hypertension Brother    • Kidney disease Brother    • Heart disease Family    • Diabetes type II Family        Past Medical History:   Diagnosis Date   • Aortic valve regurgitation 01/26/2017   • Arthritis    • Cancer [facility])    • Coronary artery disease    • Coronary artery disease with angina pectoris ([facility] [address] ) 3/19/2019   • Edema 12/02/2016   • GERD (gastroesophageal reflux disease)    • Heart disease    • Heart valve disorder    • Heart valve replaced    • History of basal cell cancer    • HL (hearing loss)     Wears hearing aids   • Hx of tissue graft 03/29/2017   • Hyperlipemia 01/26/2017   • Hypertension    • Non-rheumatic mitral regurgitation 10/21/2016   • Obesity    • Right bundle branch block (RBBB), 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       Past Surgical History:   Procedure Laterality Date   • ADENOIDECTOMY     • APPENDECTOMY     • CARDIAC SURGERY     • CARDIAC VALVE REPLACEMENT     • EYE SURGERY     • HERNIA REPAIR     • TONSILLECTOMY     • VASECTOMY         Social History     Socioeconomic History   • Marital status: /Civil Union     Spouse name: None   • Number of children: 3   • Years of education: None   • Highest education level: None   Occupational History   • Occupation:  retired [employer]   Tobacco Use   • Smoking status: Former     Packs/day: 0 50     Types: Cigarettes, Cigars   • Smokeless tobacco: Former   • Tobacco comments:     quit 10 years ago, NEVER A SMOKER AS PER [employer]   Vaping Use   • Vaping Use: Never used   Substance and Sexual Activity   • Alcohol use: No   • Drug use: No   • Sexual activity: Not Currently     Partners: Female   Other Topics Concern   • None   Social History Narrative    PT states he does not drink any caffeine      Social Determinants of Health     Financial Resource Strain: Low Risk    • Difficulty of Paying Living Expenses: Not very hard   Food Insecurity: Not on file   Transportation Needs: No Transportation Needs   • Lack of Transportation (Medical): No   • Lack of Transportation (Non-Medical):  No   Physical Activity: Not on file   Stress: Not on file   Social Connections: Not on file   Intimate Partner Violence: Not on file   Housing Stability: Not on file       Allergies   Allergen Reactions   • Orphenadrine GI Intolerance       BMI Counseling: Body mass index is 31 1 kg/m²  The BMI is above normal  Nutrition recommendations include decreasing portion sizes, moderation in carbohydrate intake, increasing intake of lean protein, reducing intake of saturated and trans fat and reducing intake of cholesterol  No pharmacotherapy was ordered  Patient referred to PCP  Rationale for BMI follow-up plan is due to patient being overweight or obese  Objective:    /58 (BP Location: Left arm, Patient Position: Sitting, Cuff Size: Child)   Pulse 79   Temp 97 8 °F (36 6 °C)   Resp 16   Ht 5' 9" (1 753 m)   Wt 95 5 kg (210 lb 9 6 oz)   SpO2 97%   BMI 31 10 kg/m²        Physical Exam  Constitutional:       General: He is not in acute distress  Appearance: He is well-developed  HENT:      Head: Normocephalic and atraumatic  Nose: Nose normal       Mouth/Throat:      Pharynx: No oropharyngeal exudate  Eyes:      General: No scleral icterus  Conjunctiva/sclera: Conjunctivae normal       Pupils: Pupils are equal, round, and reactive to light  Neck:      Thyroid: No thyromegaly  Vascular: No JVD  Trachea: No tracheal deviation  Cardiovascular:      Rate and Rhythm: Normal rate and regular rhythm  Heart sounds: Murmur heard  No friction rub  No gallop  Pulmonary:      Effort: Pulmonary effort is normal  No respiratory distress  Breath sounds: No wheezing or rales  Chest:      Chest wall: No tenderness  Abdominal:      General: Bowel sounds are normal  There is no distension  Palpations: Abdomen is soft  There is no mass  Tenderness: There is no abdominal tenderness  There is no guarding or rebound  Musculoskeletal:         General: No deformity  Normal range of motion        Cervical back: Normal range of motion and neck supple  Lymphadenopathy:      Cervical: No cervical adenopathy  Skin:     General: Skin is warm and dry  Findings: No rash  Neurological:      Mental Status: He is alert and oriented to person, place, and time  Cranial Nerves: No cranial nerve deficit  Coordination: Coordination normal    Psychiatric:         Behavior: Behavior normal          Thought Content:  Thought content normal          Judgment: Judgment normal

## 2023-02-09 NOTE — PROGRESS NOTES
Diabetic Foot Exam    Diabetic Foot ExamAssessment/Plan:      Diet reviewed  Lifestyle modifications reviewed  Medications reviewed and ordered  Laboratory tests and studies reviewed and ordered  All patient's questions answered to patient satisfaction  Chief Complaint   Patient presents with   • Follow-up     Routine  Diagnoses and all orders for this visit:    DM type 2 with diabetic peripheral neuropathy ([facility] )    Aortic prosthetic valve regurgitation, subsequent encounter    Atherosclerosis of native artery of both lower extremities, with unspecified presence of clinical manifestation (HCC)    Chronic diastolic congestive heart failure, NYHA class 2 ([facility])    Essential hypertension    Multiple vessel coronary artery disease    Non-rheumatic mitral regurgitation    Paroxysmal atrial fibrillation ([facility])    Right bundle branch block with left anterior fascicular block    Vascular parkinsonism ([facility])    Stage 3a chronic kidney disease ([facility] [address] )    Ambulatory dysfunction    Cognitive decline    Edema, unspecified type    History of heart valve replacement with tissue graft    Pure hypercholesterolemia    Hyponatremia with excess extracellular fluid volume    Pacemaker        Subjective:     HPI     Patient ID: Cecil Londono is a 81 y o  male          Current Outpatient Medications:   •  allopurinol (ZYLOPRIM) 100 mg tablet, Take 1 tablet (100 mg total) by mouth daily, Disp: 90 tablet, Rfl: 3  •  amLODIPine (NORVASC) 2 5 mg tablet, Take 1 tablet (2 5 mg total) by mouth daily, Disp: 90 tablet, Rfl: 3  •  ascorbic acid (VITAMIN C) 500 MG tablet, Take 500 mg by mouth daily, Disp: , Rfl:   •  atorvastatin (LIPITOR) 20 mg tablet, Take 1 tablet (20 mg total) by mouth daily, Disp: 90 tablet, Rfl: 1  •  Blood Glucose Monitoring Suppl (ONE TOUCH ULTRA 2) w/Device KIT, Use daily To test blood sugar 1x daily DX:diabetes, Disp: 1 kit, Rfl: 0  •  bumetanide (BUMEX) 1 mg tablet, Take 1 tablet (1 mg total) by mouth daily, Disp: 90 tablet, Rfl: 2  •  carbidopa-levodopa (SINEMET)  mg per tablet, carbidopa 25 mg-levodopa 100 mg tablet  TAKE 1 TABLET BY MOUTH THREE TIMES A DAY, Disp: , Rfl:   •  clotrimazole-betamethasone (LOTRISONE) 1-0 05 % cream, Apply topically 2 (two) times a day, Disp: 45 g, Rfl: 1  •  doxazosin (CARDURA) 4 mg tablet, Take 1 tablet (4 mg total) by mouth daily at bedtime, Disp: 90 tablet, Rfl: 3  •  Eliquis 5 MG, Take 1 tablet (5 mg total) by mouth 2 (two) times a day, Disp: 180 tablet, Rfl: 0  •  fluticasone (FLONASE) 50 mcg/act nasal spray, 1 spray into each nostril 2 (two) times a day, Disp: 48 g, Rfl: 3  •  glucose blood (OneTouch Ultra) test strip, Use 1 each daily as needed TEST TWICE DAILY AS NEEDED, Disp: , Rfl:   •  glucose blood (OneTouch Ultra) test strip, Use 1 each daily Test blood sugar, Disp: 100 strip, Rfl: 6  •  glucose blood test strip, OneTouch Ultra Test strips, Disp: , Rfl:   •  glucose blood test strip, OneTouch Ultra Test strips  USE 1 EACH DAILY TO TEST BLOOD SUGAR, Disp: , Rfl:   •  hydrOXYzine HCL (ATARAX) 10 mg tablet, TAKE 1 TABLET (10 MG TOTAL) BY MOUTH IN THE MORNING, Disp: 90 tablet, Rfl: 1  •  ketoconazole (NIZORAL) 2 % cream, , Disp: , Rfl:   •  Lancets (OneTouch Delica Plus [id number]) MISC, USE TO TEST BLOOD SUGARS ONCE DAILY, Disp: 100 each, Rfl: 6  •  Lancets (OneTouch Delica Plus [id number]) MISC, Use 1 each in the morning Test once daily for diabetes  , Disp: 100 each, Rfl: 3  •  metFORMIN (GLUCOPHAGE-XR) 500 mg 24 hr tablet, Take 1 tablet (500 mg total) by mouth daily with dinner, Disp: 90 tablet, Rfl: 3  •  miconazole (MICOTIN) 2 % powder, Apply topically as needed for itching, Disp: 70 g, Rfl: 5  •  mometasone (ELOCON) 0 1 % lotion, One drop affected ear canal daily at bedtime when necessary itching, Disp: 30 mL, Rfl: 0  •  Multiple Vitamins-Minerals (multivitamin with minerals) tablet, Take 1 tablet by mouth daily, Disp: , Rfl:   •  nitroglycerin (NITROSTAT) 0 4 mg SL tablet, Place 1 tablet (0 4 mg total) under the tongue every 5 (five) minutes as needed for chest pain, Disp: 90 tablet, Rfl: 1  •  nystatin (MYCOSTATIN) cream, Apply topically 2 (two) times a day, Disp: 150 g, Rfl: 2  •  nystatin (MYCOSTATIN) powder, Apply topically 3 (three) times a day, Disp: 60 g, Rfl: 1  •  pantoprazole (PROTONIX) 40 mg tablet, TAKE 1 TABLET BY MOUTH DAILY AT BEDTIME, Disp: 90 tablet, Rfl: 1  •  pimecrolimus (ELIDEL) 1 % cream, , Disp: , Rfl:   •  Pomegranate, [place] granatum, (POMEGRANATE PO), Take by mouth, Disp: , Rfl:   •  telmisartan (MICARDIS) 40 mg tablet, Take 1 tablet (40 mg total) by mouth daily, Disp: 90 tablet, Rfl: 3  •  triamcinolone (KENALOG) 0 1 % cream, , Disp: , Rfl:   •  TURMERIC PO, Take by mouth, Disp: , Rfl:   •  Zoster Vac Recomb Adjuvanted 50 MCG/0 5ML SUSR, , Disp: , Rfl:   •  BABY ASPIRIN PO, Take 81 mg by mouth daily (Patient not taking: Reported on 1/27/2023), Disp: , Rfl:   •  ipratropium (ATROVENT) 0 03 % nasal spray, 1 spray into each nostril 3 (three) times a day (Patient not taking: Reported on 7/29/2021), Disp: 30 mL, Rfl: 11    The following portions of the patient's history were reviewed and updated as appropriate: allergies, current medications, past family history, past medical history, past social history, past surgical history and problem list     Review of Systems      Family History   Problem Relation Age of Onset   • Diabetes Mother    • Alcohol abuse Brother    • Bipolar disorder Brother    • Hypertension Brother    • Kidney disease Brother    • Heart disease Family    • Diabetes type II Family        Past Medical History:   Diagnosis Date   • Aortic valve regurgitation 01/26/2017   • Arthritis    • Cancer ([facility] [address] )    • Coronary artery disease    • Coronary artery disease with angina pectoris ([facility] [address] ) 3/19/2019   • Edema 12/02/2016   • GERD (gastroesophageal reflux disease)    • Heart disease    • Heart valve disorder    • Heart valve replaced    • History of basal cell cancer    • HL (hearing loss)     Wears hearing aids   • Hx of tissue graft 03/29/2017   • Hyperlipemia 01/26/2017   • Hypertension    • Non-rheumatic mitral regurgitation 10/21/2016   • Obesity    • Right bundle branch block (RBBB), 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       Past Surgical History:   Procedure Laterality Date   • ADENOIDECTOMY     • APPENDECTOMY     • CARDIAC SURGERY     • CARDIAC VALVE REPLACEMENT     • EYE SURGERY     • HERNIA REPAIR     • TONSILLECTOMY     • VASECTOMY         Social History     Socioeconomic History   • Marital status: /Civil Union     Spouse name: None   • Number of children: 3   • Years of education: None   • Highest education level: None   Occupational History   • Occupation:  retired [employer]   Tobacco Use   • Smoking status: Former     Packs/day: 0 50     Types: Cigarettes, Cigars   • Smokeless tobacco: Former   • Tobacco comments:     quit 10 years ago, NEVER A SMOKER AS PER [employer]   Vaping Use   • Vaping Use: Never used   Substance and Sexual Activity   • Alcohol use: No   • Drug use: No   • Sexual activity: Not Currently     Partners: Female   Other Topics Concern   • None   Social History Narrative    PT states he does not drink any caffeine      Social Determinants of Health     Financial Resource Strain: Low Risk    • Difficulty of Paying Living Expenses: Not very hard   Food Insecurity: Not on file   Transportation Needs: No Transportation Needs   • Lack of Transportation (Medical): No   • Lack of Transportation (Non-Medical): No   Physical Activity: Not on file   Stress: Not on file   Social Connections: Not on file   Intimate Partner Violence: Not on file   Housing Stability: Not on file       Allergies   Allergen Reactions   • Orphenadrine GI Intolerance            Objective: There were no vitals taken for this visit         Physical Exam

## 2023-02-20 DIAGNOSIS — E11.42 DM TYPE 2 WITH DIABETIC PERIPHERAL NEUROPATHY (HCC): Primary | ICD-10-CM

## 2023-02-20 RX ORDER — LANCING DEVICE/LANCETS
KIT MISCELLANEOUS
Qty: 1 EACH | Refills: 0 | Status: SHIPPED | OUTPATIENT
Start: 2023-02-20

## 2023-03-10 ENCOUNTER — TELEPHONE (OUTPATIENT)
Dept: FAMILY MEDICINE CLINIC | Facility: CLINIC | Age: 88
End: 2023-03-10

## 2023-03-30 ENCOUNTER — APPOINTMENT (OUTPATIENT)
Dept: LAB | Age: 88
End: 2023-03-30

## 2023-03-30 DIAGNOSIS — E11.42 DM TYPE 2 WITH DIABETIC PERIPHERAL NEUROPATHY (HCC): ICD-10-CM

## 2023-03-30 DIAGNOSIS — I48.0 PAROXYSMAL ATRIAL FIBRILLATION (HCC): ICD-10-CM

## 2023-03-30 DIAGNOSIS — R41.89 COGNITIVE DECLINE: ICD-10-CM

## 2023-03-30 DIAGNOSIS — E87.1 HYPONATREMIA WITH EXCESS EXTRACELLULAR FLUID VOLUME: ICD-10-CM

## 2023-03-30 DIAGNOSIS — I50.32 CHRONIC DIASTOLIC CONGESTIVE HEART FAILURE, NYHA CLASS 2 (HCC): ICD-10-CM

## 2023-03-30 LAB
ALBUMIN SERPL BCP-MCNC: 3 G/DL (ref 3.5–5)
ALP SERPL-CCNC: 97 U/L (ref 46–116)
ALT SERPL W P-5'-P-CCNC: 29 U/L (ref 12–78)
ANION GAP SERPL CALCULATED.3IONS-SCNC: 2 MMOL/L (ref 4–13)
AST SERPL W P-5'-P-CCNC: 27 U/L (ref 5–45)
BASOPHILS # BLD AUTO: 0.06 THOUSANDS/ÂΜL (ref 0–0.1)
BASOPHILS NFR BLD AUTO: 1 % (ref 0–1)
BILIRUB SERPL-MCNC: 0.59 MG/DL (ref 0.2–1)
BUN SERPL-MCNC: 21 MG/DL (ref 5–25)
CALCIUM ALBUM COR SERPL-MCNC: 9.7 MG/DL (ref 8.3–10.1)
CALCIUM SERPL-MCNC: 8.9 MG/DL (ref 8.3–10.1)
CHLORIDE SERPL-SCNC: 104 MMOL/L (ref 96–108)
CO2 SERPL-SCNC: 27 MMOL/L (ref 21–32)
CREAT SERPL-MCNC: 1.17 MG/DL (ref 0.6–1.3)
EOSINOPHIL # BLD AUTO: 0.34 THOUSAND/ÂΜL (ref 0–0.61)
EOSINOPHIL NFR BLD AUTO: 4 % (ref 0–6)
ERYTHROCYTE [DISTWIDTH] IN BLOOD BY AUTOMATED COUNT: 12.9 % (ref 11.6–15.1)
GFR SERPL CREATININE-BSD FRML MDRD: 56 ML/MIN/1.73SQ M
GLUCOSE P FAST SERPL-MCNC: 132 MG/DL (ref 65–99)
HCT VFR BLD AUTO: 34.8 % (ref 36.5–49.3)
HGB BLD-MCNC: 11.8 G/DL (ref 12–17)
IMM GRANULOCYTES # BLD AUTO: 0.04 THOUSAND/UL (ref 0–0.2)
IMM GRANULOCYTES NFR BLD AUTO: 1 % (ref 0–2)
LYMPHOCYTES # BLD AUTO: 1.72 THOUSANDS/ÂΜL (ref 0.6–4.47)
LYMPHOCYTES NFR BLD AUTO: 20 % (ref 14–44)
MCH RBC QN AUTO: 33.2 PG (ref 26.8–34.3)
MCHC RBC AUTO-ENTMCNC: 33.9 G/DL (ref 31.4–37.4)
MCV RBC AUTO: 98 FL (ref 82–98)
MONOCYTES # BLD AUTO: 0.73 THOUSAND/ÂΜL (ref 0.17–1.22)
MONOCYTES NFR BLD AUTO: 9 % (ref 4–12)
NEUTROPHILS # BLD AUTO: 5.54 THOUSANDS/ÂΜL (ref 1.85–7.62)
NEUTS SEG NFR BLD AUTO: 65 % (ref 43–75)
NRBC BLD AUTO-RTO: 0 /100 WBCS
PLATELET # BLD AUTO: 171 THOUSANDS/UL (ref 149–390)
PMV BLD AUTO: 11 FL (ref 8.9–12.7)
POTASSIUM SERPL-SCNC: 4.2 MMOL/L (ref 3.5–5.3)
PROT SERPL-MCNC: 6.9 G/DL (ref 6.4–8.4)
RBC # BLD AUTO: 3.55 MILLION/UL (ref 3.88–5.62)
SODIUM SERPL-SCNC: 133 MMOL/L (ref 135–147)
TSH SERPL DL<=0.05 MIU/L-ACNC: 3.29 UIU/ML (ref 0.45–4.5)
WBC # BLD AUTO: 8.43 THOUSAND/UL (ref 4.31–10.16)

## 2023-04-01 LAB
EST. AVERAGE GLUCOSE BLD GHB EST-MCNC: 148 MG/DL
HBA1C MFR BLD: 6.8 %

## 2023-04-23 DIAGNOSIS — I10 BENIGN ESSENTIAL HYPERTENSION: ICD-10-CM

## 2023-04-24 RX ORDER — TELMISARTAN 40 MG/1
TABLET ORAL
Qty: 90 TABLET | Refills: 3 | Status: SHIPPED | OUTPATIENT
Start: 2023-04-24

## 2023-05-12 ENCOUNTER — RA CDI HCC (OUTPATIENT)
Dept: OTHER | Facility: HOSPITAL | Age: 88
End: 2023-05-12

## 2023-05-12 ENCOUNTER — APPOINTMENT (OUTPATIENT)
Dept: LAB | Age: 88
End: 2023-05-12

## 2023-05-12 DIAGNOSIS — I50.32 CHRONIC DIASTOLIC CONGESTIVE HEART FAILURE, NYHA CLASS 2 (HCC): ICD-10-CM

## 2023-05-12 LAB
ALBUMIN SERPL BCP-MCNC: 3.2 G/DL (ref 3.5–5)
ALP SERPL-CCNC: 104 U/L (ref 46–116)
ALT SERPL W P-5'-P-CCNC: 35 U/L (ref 12–78)
ANION GAP SERPL CALCULATED.3IONS-SCNC: 5 MMOL/L (ref 4–13)
AST SERPL W P-5'-P-CCNC: 36 U/L (ref 5–45)
BASOPHILS # BLD AUTO: 0.04 THOUSANDS/ÂΜL (ref 0–0.1)
BASOPHILS NFR BLD AUTO: 0 % (ref 0–1)
BILIRUB SERPL-MCNC: 0.61 MG/DL (ref 0.2–1)
BUN SERPL-MCNC: 19 MG/DL (ref 5–25)
CALCIUM ALBUM COR SERPL-MCNC: 9.4 MG/DL (ref 8.3–10.1)
CALCIUM SERPL-MCNC: 8.8 MG/DL (ref 8.3–10.1)
CHLORIDE SERPL-SCNC: 100 MMOL/L (ref 96–108)
CO2 SERPL-SCNC: 26 MMOL/L (ref 21–32)
CREAT SERPL-MCNC: 1.2 MG/DL (ref 0.6–1.3)
EOSINOPHIL # BLD AUTO: 0.37 THOUSAND/ÂΜL (ref 0–0.61)
EOSINOPHIL NFR BLD AUTO: 4 % (ref 0–6)
ERYTHROCYTE [DISTWIDTH] IN BLOOD BY AUTOMATED COUNT: 13.2 % (ref 11.6–15.1)
GFR SERPL CREATININE-BSD FRML MDRD: 54 ML/MIN/1.73SQ M
GLUCOSE P FAST SERPL-MCNC: 125 MG/DL (ref 65–99)
HCT VFR BLD AUTO: 35.8 % (ref 36.5–49.3)
HGB BLD-MCNC: 12 G/DL (ref 12–17)
IMM GRANULOCYTES # BLD AUTO: 0.06 THOUSAND/UL (ref 0–0.2)
IMM GRANULOCYTES NFR BLD AUTO: 1 % (ref 0–2)
LYMPHOCYTES # BLD AUTO: 2.08 THOUSANDS/ÂΜL (ref 0.6–4.47)
LYMPHOCYTES NFR BLD AUTO: 23 % (ref 14–44)
MCH RBC QN AUTO: 33 PG (ref 26.8–34.3)
MCHC RBC AUTO-ENTMCNC: 33.5 G/DL (ref 31.4–37.4)
MCV RBC AUTO: 98 FL (ref 82–98)
MONOCYTES # BLD AUTO: 0.84 THOUSAND/ÂΜL (ref 0.17–1.22)
MONOCYTES NFR BLD AUTO: 9 % (ref 4–12)
NEUTROPHILS # BLD AUTO: 5.68 THOUSANDS/ÂΜL (ref 1.85–7.62)
NEUTS SEG NFR BLD AUTO: 63 % (ref 43–75)
NRBC BLD AUTO-RTO: 0 /100 WBCS
PLATELET # BLD AUTO: 162 THOUSANDS/UL (ref 149–390)
PMV BLD AUTO: 10.8 FL (ref 8.9–12.7)
POTASSIUM SERPL-SCNC: 4.1 MMOL/L (ref 3.5–5.3)
PROT SERPL-MCNC: 7 G/DL (ref 6.4–8.4)
RBC # BLD AUTO: 3.64 MILLION/UL (ref 3.88–5.62)
SODIUM SERPL-SCNC: 131 MMOL/L (ref 135–147)
WBC # BLD AUTO: 9.07 THOUSAND/UL (ref 4.31–10.16)

## 2023-05-12 NOTE — PROGRESS NOTES
[address]  coding opportunities     I13 0 and E11 [id number]     Chart Reviewed number of suggestions sent to Provider: 2     Patients Insurance     Medicare Insurance: Medicare

## 2023-05-18 ENCOUNTER — OFFICE VISIT (OUTPATIENT)
Dept: FAMILY MEDICINE CLINIC | Facility: CLINIC | Age: 88
End: 2023-05-18

## 2023-05-18 VITALS
TEMPERATURE: 98.2 F | OXYGEN SATURATION: 98 % | WEIGHT: 214 LBS | HEIGHT: 69 IN | SYSTOLIC BLOOD PRESSURE: 130 MMHG | BODY MASS INDEX: 31.7 KG/M2 | HEART RATE: 66 BPM | RESPIRATION RATE: 16 BRPM | DIASTOLIC BLOOD PRESSURE: 50 MMHG

## 2023-05-18 DIAGNOSIS — G21.4 VASCULAR PARKINSONISM (HCC): ICD-10-CM

## 2023-05-18 DIAGNOSIS — E11.42 DM TYPE 2 WITH DIABETIC PERIPHERAL NEUROPATHY (HCC): ICD-10-CM

## 2023-05-18 DIAGNOSIS — I34.0 NON-RHEUMATIC MITRAL REGURGITATION: ICD-10-CM

## 2023-05-18 DIAGNOSIS — E11.9 TYPE 2 DIABETES MELLITUS WITHOUT COMPLICATION, WITHOUT LONG-TERM CURRENT USE OF INSULIN (HCC): ICD-10-CM

## 2023-05-18 DIAGNOSIS — I50.32 CHRONIC DIASTOLIC CONGESTIVE HEART FAILURE, NYHA CLASS 2 (HCC): Primary | ICD-10-CM

## 2023-05-18 DIAGNOSIS — N18.31 STAGE 3A CHRONIC KIDNEY DISEASE (HCC): ICD-10-CM

## 2023-05-18 DIAGNOSIS — T82.897D AORTIC PROSTHETIC VALVE REGURGITATION, SUBSEQUENT ENCOUNTER: ICD-10-CM

## 2023-05-18 DIAGNOSIS — I10 ESSENTIAL HYPERTENSION: ICD-10-CM

## 2023-05-18 DIAGNOSIS — F33.9 DEPRESSION, RECURRENT (HCC): ICD-10-CM

## 2023-05-18 DIAGNOSIS — I73.9 PERIPHERAL ARTERY INSUFFICIENCY (HCC): ICD-10-CM

## 2023-05-18 DIAGNOSIS — I48.0 PAROXYSMAL ATRIAL FIBRILLATION (HCC): ICD-10-CM

## 2023-05-18 DIAGNOSIS — I70.25 ATHEROSCLEROSIS OF NATIVE ARTERIES OF THE EXTREMITIES WITH ULCERATION (HCC): ICD-10-CM

## 2023-05-18 RX ORDER — LANCETS 33 GAUGE
EACH MISCELLANEOUS
Qty: 100 EACH | Refills: 6 | Status: SHIPPED | OUTPATIENT
Start: 2023-05-18

## 2023-05-18 NOTE — PROGRESS NOTES
Assessment/Plan: This 23-year-old male is here for the following:    Chronic diastolic congestive heart failure is well controlled  The patient has no peripheral edema  His electrolytes remained stable as to his BUN and creatinine  His serum sodium is fallen to 131 and he was cautioned to maintain his fluid restriction which controls the sodium usually in the 1 34-1 36 range  He is not short of breath with mild exertion  Blood sugar remains well controlled and hemoglobin A1c is excellent  The patient will remain on the same medications  He will return for his next appointment in approximately 2 months with labs done before the appointment  Diet reviewed  Lifestyle modifications reviewed  Medications reviewed and ordered  Laboratory tests and studies reviewed and ordered  All patient's questions answered to patient satisfaction  Chief Complaint   Patient presents with   • Follow-up     Medication refills needed, no other concerns at this time  CT          Diagnoses and all orders for this visit:    Chronic diastolic congestive heart failure, NYHA class 2 (HCC)  -     CBC and differential; Future  -     Comprehensive metabolic panel; Future  -     Hemoglobin A1C; Future    Aortic prosthetic valve regurgitation, subsequent encounter    Essential hypertension    Non-rheumatic mitral regurgitation    Paroxysmal atrial fibrillation (HCC)    Peripheral artery insufficiency (HCC)    DM type 2 with diabetic peripheral neuropathy (HCC)  -     CBC and differential; Future  -     Comprehensive metabolic panel; Future  -     Hemoglobin A1C; Future    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)    Vascular parkinsonism (HCC)    Depression, recurrent (HCC)    Stage 3a chronic kidney disease (HCC)      Subjective:     HPI     Patient ID: Kimberley Lopez is a 81 y o  male          Current Outpatient Medications:   •  allopurinol (ZYLOPRIM) 100 mg tablet, Take 1 tablet (100 mg total) by mouth daily, Disp: 90 tablet, Rfl: 3  •  amLODIPine (NORVASC) 2 5 mg tablet, Take 1 tablet (2 5 mg total) by mouth daily, Disp: 90 tablet, Rfl: 3  •  ascorbic acid (VITAMIN C) 500 MG tablet, Take 500 mg by mouth daily, Disp: , Rfl:   •  atorvastatin (LIPITOR) 20 mg tablet, Take 1 tablet (20 mg total) by mouth daily, Disp: 90 tablet, Rfl: 1  •  Blood Glucose Monitoring Suppl (ONE TOUCH ULTRA 2) w/Device KIT, Use daily To test blood sugar 1x daily DX:diabetes, Disp: 1 kit, Rfl: 0  •  bumetanide (BUMEX) 1 mg tablet, Take 1 tablet (1 mg total) by mouth daily, Disp: 90 tablet, Rfl: 2  •  carbidopa-levodopa (SINEMET)  mg per tablet, carbidopa 25 mg-levodopa 100 mg tablet  TAKE 1 TABLET BY MOUTH THREE TIMES A DAY, Disp: , Rfl:   •  clotrimazole-betamethasone (LOTRISONE) 1-0 05 % cream, Apply topically 2 (two) times a day, Disp: 45 g, Rfl: 1  •  doxazosin (CARDURA) 4 mg tablet, Take 1 tablet (4 mg total) by mouth daily at bedtime, Disp: 90 tablet, Rfl: 3  •  Eliquis 5 MG, Take 1 tablet (5 mg total) by mouth 2 (two) times a day, Disp: 180 tablet, Rfl: 0  •  fluticasone (FLONASE) 50 mcg/act nasal spray, 1 spray into each nostril 2 (two) times a day, Disp: 48 g, Rfl: 3  •  glucose blood (OneTouch Ultra) test strip, Use 1 each daily as needed TEST TWICE DAILY AS NEEDED, Disp: , Rfl:   •  glucose blood (OneTouch Ultra) test strip, Use 1 each daily Test blood sugar, Disp: 100 strip, Rfl: 6  •  glucose blood test strip, OneTouch Ultra Test strips, Disp: , Rfl:   •  glucose blood test strip, OneTouch Ultra Test strips  USE 1 EACH DAILY TO TEST BLOOD SUGAR, Disp: , Rfl:   •  hydrOXYzine HCL (ATARAX) 10 mg tablet, TAKE 1 TABLET (10 MG TOTAL) BY MOUTH IN THE MORNING, Disp: 90 tablet, Rfl: 1  •  Lancet Devices (OneTouch Delica Plus Lancing) MISC, Test once daily for diabetes  , Disp: 1 each, Rfl: 0  •  Lancets (OneTouch Delica Plus [id number]) MISC, Use 1 each in the morning Test once daily for diabetes  , Disp: 100 each, Rfl: 3  • metFORMIN (GLUCOPHAGE-XR) 500 mg 24 hr tablet, Take 1 tablet (500 mg total) by mouth daily with dinner, Disp: 90 tablet, Rfl: 3  •  miconazole (MICOTIN) 2 % powder, Apply topically as needed for itching, Disp: 70 g, Rfl: 5  •  Multiple Vitamins-Minerals (multivitamin with minerals) tablet, Take 1 tablet by mouth daily, Disp: , Rfl:   •  nystatin (MYCOSTATIN) powder, Apply topically 3 (three) times a day, Disp: 60 g, Rfl: 1  •  pantoprazole (PROTONIX) 40 mg tablet, Take 1 tablet (40 mg total) by mouth daily at bedtime, Disp: 90 tablet, Rfl: 1  •  Pomegranate, [place] granatum, (POMEGRANATE PO), Take by mouth, Disp: , Rfl:   •  telmisartan (MICARDIS) 40 mg tablet, TAKE 1 TABLET BY MOUTH EVERY DAY, Disp: 90 tablet, Rfl: 3  •  TURMERIC PO, Take by mouth, Disp: , Rfl:   •  BABY ASPIRIN PO, Take 81 mg by mouth daily (Patient not taking: Reported on 1/27/2023), Disp: , Rfl:   •  ipratropium (ATROVENT) 0 03 % nasal spray, 1 spray into each nostril 3 (three) times a day (Patient not taking: Reported on 7/29/2021), Disp: 30 mL, Rfl: 11  •  ketoconazole (NIZORAL) 2 % cream, , Disp: , Rfl:   •  Lancets (OneTouch Delica Plus [id number]) MISC, USE TO TEST BLOOD SUGARS ONCE DAILY, Disp: 100 each, Rfl: 6  •  mometasone (ELOCON) 0 1 % lotion, One drop affected ear canal daily at bedtime when necessary itching (Patient not taking: Reported on 4/11/2023), Disp: 30 mL, Rfl: 0  •  nitroglycerin (NITROSTAT) 0 4 mg SL tablet, Place 1 tablet (0 4 mg total) under the tongue every 5 (five) minutes as needed for chest pain (Patient not taking: Reported on 4/11/2023), Disp: 90 tablet, Rfl: 1  •  nystatin (MYCOSTATIN) cream, Apply topically 2 (two) times a day (Patient not taking: Reported on 4/11/2023), Disp: 150 g, Rfl: 2  •  pimecrolimus (ELIDEL) 1 % cream, , Disp: , Rfl:   •  triamcinolone (KENALOG) 0 1 % cream, , Disp: , Rfl:   •  Zoster Vac Recomb Adjuvanted 50 MCG/0 5ML SUSR, , Disp: , Rfl:     The following portions of the patient's history were reviewed and updated as appropriate: allergies, current medications, past family history, past medical history, past social history, past surgical history and problem list     Review of Systems      Family History   Problem Relation Age of Onset   • Diabetes Mother    • Alcohol abuse Brother    • Bipolar disorder Brother    • Hypertension Brother    • Kidney disease Brother    • Heart disease Family    • Diabetes type II Family        Past Medical History:   Diagnosis Date   • Aortic valve regurgitation 01/26/2017   • Arthritis    • Cancer ([facility] )    • Coronary artery disease    • Coronary artery disease with angina pectoris ([facility] ) 3/19/2019   • Edema 12/02/2016   • GERD (gastroesophageal reflux disease)    • Heart disease    • Heart valve disorder    • Heart valve replaced    • History of basal cell cancer    • HL (hearing loss)     Wears hearing aids   • Hx of tissue graft 03/29/2017   • Hyperlipemia 01/26/2017   • Hypertension    • Non-rheumatic mitral regurgitation 10/21/2016   • Obesity    • Right bundle branch block (RBBB), 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       Past Surgical History:   Procedure Laterality Date   • ADENOIDECTOMY     • APPENDECTOMY     • CARDIAC SURGERY     • CARDIAC VALVE REPLACEMENT     • EYE SURGERY     • HERNIA REPAIR     • TONSILLECTOMY     • VASECTOMY         Social History     Socioeconomic History   • Marital status: /Civil Union     Spouse name: None   • Number of children: 3   • Years of education: None   • Highest education level: None   Occupational History   • Occupation:  retired [employer]   Tobacco Use   • Smoking status: Former     Packs/day: 0 50     Types: Cigarettes, Cigars   • Smokeless tobacco: Former   • Tobacco comments:     quit 10 years ago, NEVER A SMOKER AS PER [employer]   Vaping Use   • Vaping Use: Never used   Substance and Sexual Activity   • Alcohol use: No   • Drug use: No   • Sexual activity: Not "Currently     Partners: Female   Other Topics Concern   • None   Social History Narrative    PT states he does not drink any caffeine      Social Determinants of Health     Financial Resource Strain: Low Risk    • Difficulty of Paying Living Expenses: Not very hard   Food Insecurity: Not on file   Transportation Needs: No Transportation Needs   • Lack of Transportation (Medical): No   • Lack of Transportation (Non-Medical):  No   Physical Activity: Not on file   Stress: Not on file   Social Connections: Not on file   Intimate Partner Violence: Not on file   Housing Stability: Not on file       Allergies   Allergen Reactions   • Orphenadrine GI Intolerance            Objective:    /50 (BP Location: Left arm, Patient Position: Sitting, Cuff Size: Standard)   Pulse 66   Temp 98 2 °F (36 8 °C) (Tympanic)   Resp 16   Ht 5' 9\" (1 753 m)   Wt 97 1 kg (214 lb)   SpO2 98%   BMI 31 60 kg/m²        Physical Exam    "

## 2023-06-27 DIAGNOSIS — E87.1 HYPONATREMIA WITH EXCESS EXTRACELLULAR FLUID VOLUME: ICD-10-CM

## 2023-06-27 DIAGNOSIS — I11.0 HYPERTENSIVE HEART DISEASE WITH CONGESTIVE HEART FAILURE, UNSPECIFIED HEART FAILURE TYPE (HCC): ICD-10-CM

## 2023-06-27 DIAGNOSIS — I10 HYPERTENSION, UNSPECIFIED TYPE: ICD-10-CM

## 2023-06-27 DIAGNOSIS — E11.9 TYPE 2 DIABETES MELLITUS WITHOUT COMPLICATION, WITHOUT LONG-TERM CURRENT USE OF INSULIN (HCC): ICD-10-CM

## 2023-06-27 DIAGNOSIS — E11.42 DM TYPE 2 WITH DIABETIC PERIPHERAL NEUROPATHY (HCC): ICD-10-CM

## 2023-06-27 DIAGNOSIS — I38 CHRONIC DIASTOLIC HEART FAILURE DUE TO VALVULAR DISEASE (HCC): ICD-10-CM

## 2023-06-27 DIAGNOSIS — N18.31 STAGE 3A CHRONIC KIDNEY DISEASE (HCC): ICD-10-CM

## 2023-06-27 DIAGNOSIS — E78.5 HYPERLIPIDEMIA, UNSPECIFIED HYPERLIPIDEMIA TYPE: ICD-10-CM

## 2023-06-27 DIAGNOSIS — I50.32 CHRONIC DIASTOLIC HEART FAILURE DUE TO VALVULAR DISEASE (HCC): ICD-10-CM

## 2023-06-27 DIAGNOSIS — I50.32 CHRONIC DIASTOLIC CONGESTIVE HEART FAILURE, NYHA CLASS 2 (HCC): ICD-10-CM

## 2023-06-27 RX ORDER — APIXABAN 5 MG/1
5 TABLET, FILM COATED ORAL 2 TIMES DAILY
Qty: 180 TABLET | Refills: 0 | Status: SHIPPED | OUTPATIENT
Start: 2023-06-27

## 2023-06-27 RX ORDER — BUMETANIDE 1 MG/1
1 TABLET ORAL DAILY
Qty: 90 TABLET | Refills: 2 | Status: SHIPPED | OUTPATIENT
Start: 2023-06-27

## 2023-06-27 RX ORDER — ATORVASTATIN CALCIUM 20 MG/1
20 TABLET, FILM COATED ORAL DAILY
Qty: 90 TABLET | Refills: 1 | Status: SHIPPED | OUTPATIENT
Start: 2023-06-27

## 2023-07-11 PROBLEM — K52.9 COLITIS: Status: ACTIVE | Noted: 2023-01-01

## 2023-07-11 NOTE — HOSPICE NOTE
Hospice referral received this afternoon. Liaison reached wife via room phone. Son had an appointment and was not there with her. Liaison proposed the idea of patient being evaluated for inpatient hospice. Wife, Reji Gonsalez requested that they make that decision tomorrow. Liaison checked with Dr Mary Anne Ramos and he gave his approval to that. A hospice Liaison will follow up with wife tomorrow.  Wife Reji Gonsalez Cell

## 2023-07-11 NOTE — CASE MANAGEMENT
Case Management Discharge Planning Note    Patient name Balaji Mallory  Location [place] [address]/[facility] Maria L Snow* MRN [id number]  : 1935 Date 2023       Current Admission Date: 2023  Current Admission Diagnosis:Colitis   Patient Active Problem List    Diagnosis Date Noted   • Colitis 2023   • Depression, recurrent ([address]) 2022   • Paroxysmal atrial fibrillation ([address]) 2022   • Stage 3a chronic kidney disease ([address]) 2022   • Essential hypertension 2022   • 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 2022   • Cognitive decline 2022   • Vascular parkinsonism ([address]) 2022   • Ambulatory dysfunction 2021   • Weakness 2021   • Pacemaker 2021   • Bradycardia 2021   • Peripheral artery insufficiency ([address]) 2020   • Atherosclerosis of native artery of extremity ([address]) 2020   • DM type 2 with diabetic peripheral neuropathy ([address]) 2020   • Atherosclerosis 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 ([address]) 2020   • Chronic diastolic congestive heart failure, NYHA class 2 ([address]) 2019   • 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 2018   • Spinal stenosis of lumbar region without neurogenic claudication 2018   • Hyponatremia with excess extracellular fluid volume 08/10/2018   • Chest pain in adult 2018   • Non-rheumatic mitral regurgitation 08/10/2017   • Aortic prosthetic valve regurgitation 08/10/2017   • History of heart valve replacement with tissue graft 2017   • Hyperlipidemia 2017   • Hypertensive heart disease with congestive heart failure ([address]) 2016   • Peripheral edema 2016   • Edema 2016   • 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/2016   • Multiple vessel coronary artery disease 2016      LOS (days): 0  Geometric Mean LOS (GMLOS) (days): 5.20  Days to GMLOS:4.8     OBJECTIVE:  Risk of Unplanned Readmission Score: 9.51 Current admission status: Inpatient   Preferred Pharmacy:   [facility] [address] [place] [address] - [address] [facility] [address]  [place]  Phone: [phone] Fax: [phone]    Primary Care Provider: Jailene Sidhu MD    Primary Insurance: MEDICARE  Secondary Insurance: AARP    DISCHARGE DETAILS:                                Additional Comments: [facility] RN talked with spouse and spouse wants to wait until tomorrow to make decision. [facility] RN feels pt would qualify for [address]

## 2023-07-11 NOTE — QUICK NOTE
Revisited with patient and his wife about wishes. Discussed risks and benefits of surgery emphasizing that our strong recommendation is the patient undergo surgery given concern for colon ischemia. At this point the wife would like to discuss further with her son before making the final decision. We will follow up with wife after further discussion with son. Risks of further delaying surgery discussion including colonic perforation, intra-abdominal sepsis, and death.

## 2023-07-11 NOTE — CASE MANAGEMENT
Case Management Assessment & Discharge Planning Note    Patient name Isabelle Fearing  Location [address] [place] [address]/[facility] Hector Cota* MRN [id number]  : 1935 Date 2023       Current Admission Date: 2023  Current Admission Diagnosis:Colitis   Patient Active Problem List    Diagnosis Date Noted   • Colitis 2023   • Depression, recurrent ([address]) 2022   • Paroxysmal atrial fibrillation ([address]) 2022   • Stage 3a chronic kidney disease ([address]) 2022   • Essential hypertension 2022   • 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 2022   • Cognitive decline 2022   • Vascular parkinsonism ([address]) 2022   • Ambulatory dysfunction 2021   • Weakness 2021   • Pacemaker 2021   • Bradycardia 2021   • Peripheral artery insufficiency ([address]) 2020   • Atherosclerosis of native artery of extremity ([address]) 2020   • DM type 2 with diabetic peripheral neuropathy ([address]) 2020   • Atherosclerosis 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 ([address]) 2020   • Chronic diastolic congestive heart failure, NYHA class 2 ([address]) 2019   • 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 2018   • Spinal stenosis of lumbar region without neurogenic claudication 2018   • Hyponatremia with excess extracellular fluid volume 08/10/2018   • Chest pain in adult 2018   • Non-rheumatic mitral regurgitation 08/10/2017   • Aortic prosthetic valve regurgitation 08/10/2017   • History of heart valve replacement with tissue graft 2017   • Hyperlipidemia 2017   • Hypertensive heart disease with congestive heart failure ([address]) 2016   • Peripheral edema 2016   • Edema 2016   • 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 Advance Directives  Does patient have a [address]?: Yes  Does patient have Advance Directives?: Yes  Advance Directives: Living will, Power of  for health care  Primary Contact: Cornelius Steel [address]   [phone] (H)         Readmission Root Cause  30 Day Readmission: No    Patient Information  Admitted from[de-identified] Home  Mental Status: Alert, Confused, Other (Comment) (vascular dementia)  During Assessment patient was accompanied by: Spouse, Son  Assessment information provided by[de-identified] Spouse  Primary Caregiver: Keely Ina [address] of Residence: [address] [place] [address] do you live in?: [address] entry access options.  Select all that apply.: Elevator  Type of Current Residence: Apartment  Upon entering residence, is there a bedroom on the main floor (no further steps)?: Yes  Upon entering residence, is there a bathroom on the main floor (no further steps)?: Yes  In the last 12 months, was there a time when you were not able to pay the mortgage or rent on time?: No  In the last 12 months, how many places have you lived?: 1  In the last 12 months, was there a time when you did not have a steady place to sleep or slept in a shelter (including now)?: No  Homeless/housing insecurity resource given?: N/A  Living Arrangements: Lives w/ Spouse/significant other, Lives w/ Son  Is patient a ?: No    Activities of Daily Living Prior to Admission  Functional Status: Assistance (Spouse assist with bathing and dressing.)  Completes ADLs independently?: No  Level of ADL dependence: Assistance  Ambulates independently?: Yes  Does patient use assisted devices?: Yes  Assisted Devices (DME) used: Rollator  Does patient currently own DME?: Yes  What DME does the patient currently own?: Rollator, [email or web address] on toilet)  Does patient have a history of Outpatient Therapy (PT/OT)?: Yes  Does the patient have a history of Short-Term Rehab?: No  Does patient have a history of HHC?: No  Does patient currently have [address]?: No         Patient Information Continued  Income Source: Pension/detention  Does patient have prescription coverage?: Yes  Within the past 12 months, you worried that your food would run out before you got the money to buy more.: Never true  Within the past 12 months, the food you bought just didn't last and you didn't have money to get more.: Never true  Food insecurity resource given?: N/A  Does patient receive dialysis treatments?: No  Does patient have a history of substance abuse?: No  Does patient have a history of Mental Health Diagnosis?: No         Means of Transportation  Means of Transport to Appts[de-identified] Family transport  In the past 12 months, has lack of transportation kept you from medical appointments or from getting medications?: No  In the past 12 months, has lack of transportation kept you from meetings, work, or from getting things needed for daily living?: No  Was application for public transport provided?: N/A        DISCHARGE DETAILS:    Discharge planning discussed with[de-identified] spouse and son  Freedom of Choice: Yes  Comments - Freedom of Choice: Agreeable to referral to [facility].   CM contacted family/caregiver?: Yes  Were Treatment Team discharge recommendations reviewed with patient/caregiver?: Yes  Did patient/caregiver verbalize understanding of patient care needs?: Yes  Were patient/caregiver advised of the risks associated with not following Treatment Team discharge recommendations?: Yes                             Treatment Team Recommendation: Hospice  Discharge Destination Plan[de-identified] Hospice                                         Additional Comments: Rec a consult for hospice. Spouse and son were agreeable to referral. Will f/u.

## 2023-07-11 NOTE — RESTORATIVE TECHNICIAN NOTE
Restorative Technician Note      Patient Name: Kennedy Howell     Restorative Tech Visit Date: 07/11/23  Note Type: Mobility  Patient Position Upon Consult: Supine  Activity Performed: Range of motion  Range of Motion: Active  Patient Position at End of Consult: Supine;  All needs within reach

## 2023-07-11 NOTE — ED NOTES
Pt oxygen dropped to 84 while sleeping. Pt placed on 2 L of oxygen per provider.       Oswaldo Villafana RN  07/11/23 [id number]

## 2023-07-11 NOTE — ASSESSMENT & PLAN NOTE
Lab Results   Component Value Date    EGFR 52 07/11/2023    EGFR 54 05/12/2023    EGFR 56 03/30/2023    CREATININE 1.24 07/11/2023    CREATININE 1.20 05/12/2023    CREATININE 1.17 03/30/2023   creatinine at baseline

## 2023-07-11 NOTE — CONSULTS
Consultation - Palliative & Supportive Care   Linda Lopez Creed  81 y.o.  male  [place] [address] [facility] [address] M*   MRN: [id number]  Encounter: [id number]    ASSESSMENT:    Patient Active Problem List   Diagnosis   • Hypertensive heart disease with congestive heart failure ([address])   • History of heart valve replacement with tissue graft   • Hyperlipidemia   • Multiple vessel coronary artery disease   • Non-rheumatic mitral regurgitation   • Peripheral edema   • Aortic prosthetic valve regurgitation   • Right bundle branch block with left anterior fascicular block   • Chest pain in adult   • Hyponatremia with excess extracellular fluid volume   • 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   • Spinal stenosis of lumbar region without neurogenic claudication   • Edema   • Chronic diastolic congestive heart failure, NYHA class 2 ([address])   • Atherosclerosis of native artery of extremity ([address])   • DM type 2 with diabetic peripheral neuropathy ([address])   • Atherosclerosis 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 ([address])   • Peripheral artery insufficiency (HCC)   • Ambulatory dysfunction   • Bradycardia   • Pacemaker   • Weakness   • Essential hypertension   • 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 Goals:   • Patient appears to lack the capacity to make any medical choices on my visit this afternoon.  But patient's family, including his POA/wife and son earlier have decided to forego any more life prolonging measures and to transition to aggressive comfort measures only accepting that he is entering the end of his life. • Above decision was verified with wife who was present at bedside at time of my visit  • Explained in detail what level 3 versus level 4 code status is  • I will change his code status from Level 3 to level 4  • PPS: 20%  • D/w [facility]/Dr. Nunu Reese    Code status: Level 4 - Comfort Care   Decisional apparatus:  Patient does not have capacity to make medical decisions on my exam today. If such capacity is lost, patient's substitute decision maker would default to wife by [place] Act 169. Advance Directive / Living Will / POLST:  None on file    2. Social Support:  • Supportive listening provided. • Time spent providing emotional support to wife of 60+ years. • Wife had been his primary caregiver at home and he was entirely dependent on her in the last 2 years  • They have 3 children together and 5 grandchildren. Per wife, all children are on the same page with regards to plan of care as above  • I will place a consult to spiritual care      3. Symptom management:  • Per discussions earlier with SLIM and surgery, as well as input from wife, start the ff:  • Dilaudid 0.5mg/hr gtt  • Dilaudid 0.5mg IV q1H nursing bolus prn for moderate/severe pain and/or dyspnea/air hunger  • Ativan 0.5mg q2H prn anxiety/SOB/air hunger  • Haldol 0.5mg IV q2H prn agitation/N/V/hiccups  • Robinul 0.1mg IV q1H prn secretions  • Dulcolax 10mg rectal supp prn constipation  • Pls reach out to palliative on call via TT if needing help with comfort meds    Controlled Substance Review    [place]MP or [place]  reviewed: No red flags were identified; safe to proceed with prescription. .    No records found    I have reviewed the patient's controlled substance dispensing history in the Prescription Drug Monitoring Program in compliance with the [facility] regulations before prescribing any controlled substances. We appreciate the opportunity to participate in this patient's care.  We will continue to follow. Please do not hesitate to contact our on-call provider through our clinic answering service at [phone] should you have acute symptom control concerns. IDENTIFICATION:  Inpatient consult to Palliative Care  Consult performed by: Laxmi Levi MD  Consult ordered by: Sonia Ochoa DO        Reason for Consult / Principal Problem: goals, symptoms    HISTORY OF PRESENT ILLNESS:    Dharmesh Perez is a 81 y.o. male with Vascular parkinsonism, HTN, DM, CKD3, cHFpEF, s/p bioprosthetic AV who was admitted from home on 7/11 due to generalized abomdinal pain and was found to have evidence on imaging of large bowel/cecum ischemia with possible areas of bowel necrosis, concerning for impending perforation. Surgery on board, assessed him as high risk, poor surgical candidate. Family, including wife and son, earlier have decided to forego any surgical or aggressive interventions and focus on keeping him comfortable, knowing that he will eventually succumb to this disease. Palliative medicine consulted to further assist.    Saw patient this afternoon lying in bed. His eyes were open and he was somewhat moaning. But he went straight to sleeping. Further conversations were had with his wife at bedside. She displayed a very good understanding, judgement and insight into this acute issues and have confirmed with me earlier decision to focus on his comfort as he approached the end of his life. She is assured, despite this difficult scenario, and finds peace in knowing that she is only following his previously expressed wishes. She knows that he wouldn't want to continue living like this and would have wanted her to make sure he is not suffering towards the end. I explained Code status and recommendations for comfort in detail, including side effects of increased drowsiness as we give him more medications. She is agreeable to everything above.  I also explained changes that can be observed during the dying process. I discussed what to look for to suggest discomfort and she is encouraged to press the call button or seek the RNs outside of the room for comfort meds. Time spent providing emotional support. She was also given the opportunity to ask questions that were answered to her expressed satisfaction. It should be noted that he appeared very comfortable and peaceful while he was asleep during the remainder of my visit.      Interview and exam limited by: none    Review of Systems   Unable to perform ROS: Mental status change       Past Medical History:   Diagnosis Date   • Aortic valve regurgitation 01/26/2017   • Arthritis    • Cancer [facility])    • Coronary artery disease    • Coronary artery disease with angina pectoris ([address]) 3/19/2019   • Edema 12/02/2016   • GERD (gastroesophageal reflux disease)    • Heart disease    • Heart valve disorder    • Heart valve replaced    • History of basal cell cancer    • HL (hearing loss)     Wears hearing aids   • Hx of tissue graft 03/29/2017   • Hyperlipemia 01/26/2017   • Hypertension    • Non-rheumatic mitral regurgitation 10/21/2016   • Obesity    • Right bundle branch block (RBBB), 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     Past Surgical History:   Procedure Laterality Date   • ADENOIDECTOMY     • APPENDECTOMY     • CARDIAC SURGERY     • CARDIAC VALVE REPLACEMENT     • EYE SURGERY     • HERNIA REPAIR     • TONSILLECTOMY     • VASECTOMY       Social History     Socioeconomic History   • Marital status: /Civil Union     Spouse name: Not on file   • Number of children: 3   • Years of education: Not on file   • Highest education level: Not on file   Occupational History   • Occupation:  retired [employer]   Tobacco Use   • Smoking status: Former     Packs/day: 0.50     Types: Cigarettes, Cigars   • Smokeless tobacco: Former   • Tobacco comments:     quit 10 years ago, NEVER A SMOKER AS PER [employer]   Vaping Use   • Vaping Use: Never used   Substance and Sexual Activity   • Alcohol use: No   • Drug use: No   • Sexual activity: Not Currently     Partners: Female   Other Topics Concern   • Not on file   Social History Narrative    PT states he does not drink any caffeine      Social Determinants of Health     Financial Resource Strain: Low Risk  (12/8/2022)    Overall Financial Resource Strain (CARDIA)    • Difficulty of Paying Living Expenses: Not very hard   Food Insecurity: No Food Insecurity (7/11/2023)    Hunger Vital Sign    • Worried About Running Out of Food in the Last Year: Never true    • Ran Out of Food in the Last Year: Never true   Transportation Needs: No Transportation Needs (7/11/2023)    PRAPARE - Transportation    • Lack of Transportation (Medical): No    • Lack of Transportation (Non-Medical): No   Physical Activity: Not on file   Stress: Not on file   Social Connections: Not on file   Intimate Partner Violence: Not on file   Housing Stability: Low Risk  (7/11/2023)    Housing Stability Vital Sign    • Unable to Pay for Housing in the Last Year: No    • Number of Places Lived in the Last Year: 1    • Unstable Housing in the Last Year: No     Family History   Problem Relation Age of Onset   • Diabetes Mother    • Alcohol abuse Brother    • Bipolar disorder Brother    • Hypertension Brother    • Kidney disease Brother    • Heart disease Family    • Diabetes type II Family        MEDICATIONS / ALLERGIES:  all current active meds have been reviewed    Allergies   Allergen Reactions   • Orphenadrine GI Intolerance       OBJECTIVE:  /54   Pulse 75   Temp 98.2 °F (36.8 °C) (Oral)   Resp 20   Wt 97.2 kg (214 lb 4.6 oz)   SpO2 92%   BMI 31.64 kg/m²   Physical Exam:  Constitutional: Appears well-developed and well-nourished. Appears acutely and chronically ill looking, not toxic appearing. Comfortable when asleep, a bit restless when awake. In no acute physical or emotional distress.    Head: Normocephalic and atraumatic. Eyes: EOM are normal. No ocular discharge. No scleral icterus. Neck: No visible adenopathy or masses. Respiratory: Effort normal. No stridor. No respiratory distress. Gastrointestinal: some abdominal distension. Musculoskeletal: No edema. Neurological: Lethargic, unable to participate in conversations. Was not able to answer questions or follow commands. Skin: Dry, no diaphoresis. Pale  Psychiatric: Calm when asleep. Bit restless with awake. Lab Results: I have personally reviewed pertinent labs. Imaging Studies: I have personally reviewed pertinent reports. EKG, Pathology, and Other Studies: I have personally reviewed pertinent reports. Counseling / Coordination of Care:  Counseling / Coordination of Care  Total floor / unit time spent today 60+ minutes. Greater than 50% of total time was spent with the patient and / or family counseling and / or coordination of care. A description of the counseling / coordination of care: provided medical updates, discussed palliative care, discussed hospice care, determined competency, determined goals of care, determined POA, determined social/family support, discussed plans of care, discussed symptom management, provided psychosocial support.      Pedro Wills MD  [address] [place] [address] Dr Martel [facility]  [phone]

## 2023-07-11 NOTE — ASSESSMENT & PLAN NOTE
In the setting of likely vascular dementia.   Pt is drowsy but arousable and alerts and orients to self and hospital setting presently s/p multiple rounds of hydromorphone  D/w wife next of kin and son at bedside and confirmed level 3 dni/dnr

## 2023-07-11 NOTE — ASSESSMENT & PLAN NOTE
Lab Results   Component Value Date    HGBA1C 6.8 (H) 03/30/2023       No results for input(s): "POCGLU" in the last 72 hours.     Blood Sugar Average: Last 72 hrs:  hold metformin  Defer ssi/poc bs for now given controlled a1c

## 2023-07-11 NOTE — H&P
[facility]  H&P  Name: Julissa Martínez 81 y.o. male I MRN: [id number]  Unit/Bed#: [facility] -01 I Date of Admission: 7/11/2023   Date of Service: 7/11/2023 I Hospital Day: 0      Assessment/Plan   * Colitis  Assessment & Plan  Concern by ct w/edema and irregular enhancement involving the cecum with small adjacent foci of portal venous gas. Along the medial wall of the cecum the bowel wall is thin and nearly imperceptible concerning for bowel necrosis. No pneumoperitoneum.  -abd w/guarding and some distention but still somewhat soft. Lactic acid 2.1 vs stable.  -Case was d/w surgery with concern for likely poor prognosis w/o aggressive surgical intervention for which pt would be high risk (hx of cad, chf, cognitive decline w/vascular parkinsonism. Pt demonstrates significant short term memory deficits and cannot recall when is pain began) to which pt's wife and son report he would not want as they were not anticipating revision of a bioprosthetic aortic valve earlier this year due to his surgical risks and postop quality of life.  -Pending clinical course possible transition to hospice, this was discussed by surgery team that a course of conservative mgmt likely may not be enough with the degree of cecal effacement and decline during pt   stay. -For now level 3 dni/dnr trend lactic acid abx w/cefepime/flagyl.   No role for ngt per d/w surge resident or endoscopic decompression given risk of perforation    Stage 3a chronic kidney disease [facility])  Assessment & Plan  Lab Results   Component Value Date    EGFR 52 07/11/2023    EGFR 54 05/12/2023    EGFR 56 03/30/2023    CREATININE 1.24 07/11/2023    CREATININE 1.20 05/12/2023    CREATININE 1.17 03/30/2023   creatinine at baseline    Paroxysmal atrial fibrillation [facility])  Assessment & Plan  Hold eliquis      Vascular parkinsonism [facility])  Assessment & Plan  Hold sinemet    Cognitive decline  Assessment & Plan  In the setting of likely vascular dementia. Pt is drowsy but arousable and alerts and orients to self and hospital setting presently s/p multiple rounds of hydromorphone  D/w wife next of kin and son at bedside and confirmed level 3 dni/dnr    Essential hypertension  Assessment & Plan  Hold multiple oral meds while npo for severe cecal effacement    DM type 2 with diabetic peripheral neuropathy [facility])  Assessment & Plan  Lab Results   Component Value Date    HGBA1C 6.8 (H) 03/30/2023       No results for input(s): "POCGLU" in the last 72 hours. Blood Sugar Average: Last 72 hrs:  hold metformin  Defer ssi/poc bs for now given controlled a1c    Chronic diastolic congestive heart failure, NYHA class 2 ([facility])  Assessment & Plan  Wt Readings from Last 3 Encounters:   07/11/23 97.2 kg (214 lb 4.6 oz)   05/18/23 97.1 kg (214 lb)   04/11/23 97.1 kg (214 lb)     Weight stable on bumex  Hold bumex, I/os for now while level 3        Multiple vessel coronary artery disease  Assessment & Plan  W/hx of multiple coronary stents  Hold statin/asa         VTE Pharmacologic Prophylaxis:   High Risk (Score >/= 5) - Pharmacological DVT Prophylaxis Ordered: heparin. Sequential Compression Devices Ordered. Code Status: Level 3 - DNAR and DNI   Discussion with family: Updated  (wife and son) at bedside. 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 Total Time Spent on Date of Encounter in care of patient: 55 minutes This time was spent on one or more of the following: performing physical exam; counseling and coordination of care; obtaining or reviewing history; documenting in the medical record; reviewing/ordering tests, medications or procedures; communicating with other healthcare professionals and discussing with patient's family/caregivers.     Chief Complaint: abd pain nausea x~10h    History of Present Illness:  Kal Ledesma is a 81 y.o. male with a PMH of cad, chronic diastolic chf, aortic valve s/p bioprosthetic valve repair, vascular parkinsonism, w/cognitive impairment, niddm, htn dm ckd 3 afib on eliquis who presents with abd pain n/v since dinner night pta. Pt had beef stew w/his wife with whom he lives and shortly therafter developed abdominal discomfort w/nausea no vomiting. No diarrhea. Last bm was earlier same day. No fevers/chills/sick contacts no cp/sob. Pt did ambulate 2x to bathroom but did not d/w wife if this was w/bm or not and is a poor historian. Unfortunately had worsening pain and family was unable to bring by car and so called ems. In hospital he was noted to look more distended than normal per wife. Underwent routine labs and ct imaging concerning for ischemic colitis w/effacement of cecal wall nearly imperceptible on ct imaging as well as small adjacent foci of portal venous gas. Lactic acid only modestly elevated and vs acceptable. Case was d/w surgery however and given degree of effacement would ultimately likely need aggressive surgical intervention to which pt's wife reports they would not want this as they had deferred prior aortic revision of 6year old 'pig valve' as this conversation had come earlier in the year w/pt and his wife and cardiologist due to concerns of quality of life post operatively. Surgery d/w pt and family conservative mgmt vs level 4 comfort care/hospice care pending clinical course but laid concerns that ideally would in an otherwise healthy pt undergo surgical intervention given ct findings/risk of 2* bacterial peritonitis. Pt was made level 3 dni/dnr by ed provider and confirmed upon admission. D/w pt's wife and son who agree with conservative mgmt for now pending how clinical course goes and possible transition if labs vs decline further later. Pt himself notes abd pain no n/v.   He is alert to self and hospital but drowsy and follows simple commands after iv hydromorphone. He has baseline cognitive impairment/memory deficits per d/w family. Review of Systems:  Review of Systems   Unable to perform ROS: Mental status change       Past Medical and Surgical History:   Past Medical History:   Diagnosis Date   • Aortic valve regurgitation 01/26/2017   • Arthritis    • Cancer [facility])    • Coronary artery disease    • Coronary artery disease with angina pectoris ([address]) 3/19/2019   • Edema 12/02/2016   • GERD (gastroesophageal reflux disease)    • Heart disease    • Heart valve disorder    • Heart valve replaced    • History of basal cell cancer    • HL (hearing loss)     Wears hearing aids   • Hx of tissue graft 03/29/2017   • Hyperlipemia 01/26/2017   • Hypertension    • Non-rheumatic mitral regurgitation 10/21/2016   • Obesity    • Right bundle branch block (RBBB), 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  Authorizing Provider   allopurinol (ZYLOPRIM) 100 mg tablet Take 1 tablet (100 mg total) by mouth daily 10/27/22  Yes Funmilayo Crisostomo MD   amLODIPine [facility]) 2.5 mg tablet Take 1 tablet (2.5 mg total) by mouth daily 7/18/22  Yes Funmilayo Crisostomo MD   atorvastatin (LIPITOR) 20 mg tablet Take 1 tablet (20 mg total) by mouth daily 6/27/23  Yes Funmilayo Crisostomo MD   Blood Glucose Monitoring Suppl (ONE TOUCH ULTRA 2) w/Device KIT Use daily To test blood sugar 1x daily DX:diabetes 10/21/22  Yes MD esme Espinosaanide [facility]) 1 mg tablet Take 1 tablet (1 mg total) by mouth daily 6/27/23  Yes Funmilayo Crisostomo MD   doxazosin (CARDURA) 4 mg tablet Take 1 tablet (4 mg total) by mouth daily at bedtime 10/27/22  Yes Oj Bean MD   Eliquis 5 MG Take 1 tablet (5 mg total) by mouth 2 (two) times a day 6/27/23  Yes Oj Bean MD   glucose blood (OneTouch Ultra) test strip Use 1 each daily as needed TEST TWICE DAILY AS NEEDED   Yes Historical Provider, MD   glucose blood (OneTouch Ultra) test strip Use 1 each daily Test blood sugar 10/27/22  Yes Oj Bean MD   glucose blood test strip OneTouch Ultra Test strips   Yes Historical Provider, MD   glucose blood test strip OneTouch Ultra Test strips   USE 1 EACH DAILY TO TEST BLOOD SUGAR   Yes Historical Provider, MD   hydrOXYzine HCL (ATARAX) 10 mg tablet TAKE 1 TABLET (10 MG TOTAL) BY MOUTH IN THE MORNING 12/15/22  Yes Oj Bean MD   pantoprazole (PROTONIX) 40 mg tablet Take 1 tablet (40 mg total) by mouth daily at bedtime 2/9/23  Yes Oj Bena MD   telmisartan (MICARDIS) 40 mg tablet TAKE 1 TABLET BY MOUTH EVERY DAY 4/24/23  Yes Oj Bean MD   ascorbic acid (VITAMIN C) 500 MG tablet Take 500 mg by mouth daily  Patient not taking: Reported on 7/11/2023    Historical Provider, MD   BABY ASPIRIN PO Take 81 mg by mouth daily  Patient not taking: Reported on 1/27/2023    Historical Provider, MD   carbidopa-levodopa (SINEMET)  mg per tablet carbidopa 25 mg-levodopa 100 mg tablet   TAKE 1 TABLET BY MOUTH THREE TIMES A DAY    Historical Provider, MD   clotrimazole-betamethasone (Emigdio Crow) 1-0.05 % cream Apply topically 2 (two) times a day  Patient not taking: Reported on 7/11/2023 10/14/21   Oj Bean MD   fluticasone [facility]) 50 mcg/act nasal spray 1 spray into each nostril 2 (two) times a day 6/5/23 5/30/24  Gumaro Camacho MD   ipratropium (ATROVENT) 0.03 % nasal spray 1 spray into each nostril 3 (three) times a day  Patient not taking: Reported on 7/29/2021 1/24/20 7/29/21  Gumaro Camacho MD   ketoconazole (NIZORAL) 2 % cream  10/29/21 Historical Provider, MD   Lancet Devices (OneTouch Delica Plus Lancing) MISC Test once daily for diabetes. 2/20/23   Lord Patricia MD   Lancets (OneTouch Delica Plus [id number]) MISC Use 1 each in the morning Test once daily for diabetes.  10/27/22   Lord Patricia MD   Lancets (OneTouch Delica Plus [id number]) MISC USE TO TEST BLOOD SUGARS ONCE DAILY 5/18/23   Lord Patricia MD   metFORMIN (GLUCOPHAGE-XR) 500 mg 24 hr tablet Take 1 tablet (500 mg total) by mouth daily with dinner  Patient not taking: Reported on 7/11/2023 10/27/22   Lord Patricia MD   miconazole (MICOTIN) 2 % powder Apply topically as needed for itching  Patient not taking: Reported on 7/11/2023 9/30/21   Lord Patricia MD   mometasone (ELOCON) 0.1 % lotion One drop affected ear canal daily at bedtime when necessary itching  Patient not taking: Reported on 4/11/2023 2/6/23   Bijan Thompson MD   Multiple Vitamins-Minerals (multivitamin with minerals) tablet Take 1 tablet by mouth daily  Patient not taking: Reported on 7/11/2023    Historical Provider, MD   nitroglycerin (NITROSTAT) 0.4 mg SL tablet Place 1 tablet (0.4 mg total) under the tongue every 5 (five) minutes as needed for chest pain  Patient not taking: Reported on 4/11/2023 8/28/18   Lord Patricia MD   nystatin (MYCOSTATIN) cream Apply topically 2 (two) times a day  Patient not taking: Reported on 4/11/2023 9/30/21   Lord Patricia MD   nystatin (MYCOSTATIN) powder Apply topically 3 (three) times a day  Patient not taking: Reported on 7/11/2023 9/16/21   Alejandro Shelby MD   pimecrolimus (ELIDEL) 1 % cream  12/15/21   Historical Provider, MD   Pomegranate, [place] granatum, (POMEGRANATE PO) Take by mouth  Patient not taking: Reported on 7/11/2023    Historical Provider, MD   triamcinolone (KENALOG) 0.1 % cream  10/29/21   Historical Provider, MD   TURMERIC PO Take by mouth  Patient not taking: Reported on 7/11/2023    Historical Provider, MD Zoster Vac Recomb Adjuvanted 50 MCG/0.5ML SUSR     Historical Provider, MD MARTE have reviewed home medications with patient family member. Allergies: Allergies   Allergen Reactions   • Orphenadrine GI Intolerance       Social History:  Marital Status: /Civil Union   Occupation:   Patient Pre-hospital Living Situation: Home  Patient Pre-hospital Level of Mobility: walks with walker  Patient Pre-hospital Diet Restrictions:   Substance Use History:   Social History     Substance and Sexual Activity   Alcohol Use No     Social History     Tobacco Use   Smoking Status Former   • Packs/day: 0.50   • Types: Cigarettes, Cigars   Smokeless Tobacco Former   Tobacco Comments    quit 10 years ago, NEVER A SMOKER AS PER [employer]     Social History     Substance and Sexual Activity   Drug Use No       Family History:  Family History   Problem Relation Age of Onset   • Diabetes Mother    • Alcohol abuse Brother    • Bipolar disorder Brother    • Hypertension Brother    • Kidney disease Brother    • Heart disease Family    • Diabetes type II Family        Physical Exam:     Vitals:   Blood Pressure: 139/56 (07/11/23 0603)  Pulse: 63 (07/11/23 0603)  Temperature: 98.4 °F (36.9 °C) (07/11/23 0100)  Temp Source: Oral (07/11/23 0100)  Respirations: 18 (07/11/23 0515)  Weight - Scale: 97.2 kg (214 lb 4.6 oz) (07/11/23 0100)  SpO2: 94 % (07/11/23 0603)    Physical Exam  Vitals reviewed. Constitutional:       General: He is not in acute distress. Appearance: He is obese. He is ill-appearing. He is not toxic-appearing or diaphoretic. HENT:      Head: Normocephalic and atraumatic. Right Ear: External ear normal.      Left Ear: External ear normal.      Nose: Nose normal.   Eyes:      Extraocular Movements: Extraocular movements intact. Cardiovascular:      Rate and Rhythm: Normal rate and regular rhythm. Pulmonary:      Effort: No respiratory distress. Breath sounds: No wheezing, rhonchi or rales. Abdominal:      General: There is distension (modest distention but still somewhat soft). Palpations: Abdomen is soft. Tenderness: There is abdominal tenderness (generalized ttp worse in right abd and rlq). There is guarding. Comments: Hypoactive to absent bs x4. ruq high pitch bs   Musculoskeletal:      Right lower leg: No edema. Left lower leg: No edema. Skin:     General: Skin is warm. Neurological:      Mental Status: He is alert. Comments: Somnolent arouses to voice oriented to self hospital setting follows simple commands          Additional Data:     Lab Results:  Results from last 7 days   Lab Units 07/11/23  0125   WBC Thousand/uL 12.70*   HEMOGLOBIN g/dL 12.0   HEMATOCRIT % 36.2*   PLATELETS Thousands/uL 149   NEUTROS PCT % 82*   LYMPHS PCT % 11*   MONOS PCT % 7   EOS PCT % 0     Results from last 7 days   Lab Units 07/11/23  0125   SODIUM mmol/L 133*   POTASSIUM mmol/L 4.3   CHLORIDE mmol/L 101   CO2 mmol/L 20*   BUN mg/dL 20   CREATININE mg/dL 1.24   ANION GAP mmol/L 12   CALCIUM mg/dL 8.7   ALBUMIN g/dL 3.5   TOTAL BILIRUBIN mg/dL 0.74   ALK PHOS U/L 91   ALT U/L 27   AST U/L 32   GLUCOSE RANDOM mg/dL 172*         Results from last 7 days   Lab Units 07/11/23  0617   POC GLUCOSE mg/dl 175*         Results from last 7 days   Lab Units 07/11/23  0505 07/11/23  0301 07/11/23  0125   LACTIC ACID mmol/L 1.7 2.4*  --    PROCALCITONIN ng/ml  --   --  0.05       Lines/Drains:  Invasive Devices     Peripheral Intravenous Line  Duration           Peripheral IV 07/11/23 Dorsal (posterior); Left Forearm <1 day                    Imaging: Reviewed radiology reports from this admission including: abdominal/pelvic CT ct a/p personally reviewed w/o overt pneumoperitoneum but markedly thin cecal wall  CT abdomen pelvis with contrast   Final Result by Kalyani Brantley DO (07/11 8924)      Edema and irregular enhancement of the cecum with small adjacent foci of portal venous gas, concerning for bowel ischemia. Thinning along the medial wall of the cecum may suggest an area of bowel necrosis. I personally discussed this study with June Dyer on 7/11/2023 3:11 AM.            Workstation performed: [id number]             EKG and Other Studies Reviewed on Admission:   · EKG:    ·     ** Please Note: This note has been constructed using a voice recognition system.  **

## 2023-07-11 NOTE — ED ATTENDING ATTESTATION
7/11/2023  IPenelope MD, saw and evaluated the patient. I have discussed the patient with the resident/non-physician practitioner and agree with the resident's/non-physician practitioner's findings, Plan of Care, and MDM as documented in the resident's/non-physician practitioner's note, except where noted. All available labs and Radiology studies were reviewed. I was present for key portions of any procedure(s) performed by the resident/non-physician practitioner and I was immediately available to provide assistance. At this point I agree with the current assessment done in the Emergency Department. I have conducted an independent evaluation of this patient a history and physical is as follows:      Final Diagnosis:  1. Large bowel ischemia (HCC)    2. Abdominal pain      Chief Complaint   Patient presents with   • Abdominal Pain     Pt brought in via EMS from home for generalized abdominal pain that started this evening. Pt reports nausea, denies v/d and bloody stools. 15 of toradol given and 4 zofran given per EMS. Blood sugar was 159. Pt reports having hx of same issues. 69-year-old male with a history of hypertension, hyperlipidemia, CAD, appendectomy who presents with abdominal pain and bloating over the past 2 to 3 days. Admits to nausea without vomiting. He is having bowel movements. PMH:  -Hypertension, hyperlipidemia, CAD  PSH:  -Appendectomy      PE:   Vitals:    07/11/23 0100 07/11/23 0145 07/11/23 0334 07/11/23 0434   BP: 155/67 163/89 139/64 135/59   BP Location: Right arm  Right arm Right arm   Pulse: 69 64 89 86   Resp: 20 21 18 18   Temp: 98.4 °F (36.9 °C)      TempSrc: Oral      SpO2: 98% 97% 92% 95%   Weight: 97.2 kg (214 lb 4.6 oz)            Constitutional: Vital signs are normal. He appears well-developed. He is cooperative. No distress. Chronically ill-appearing. Cardiovascular: Normal rate, regular rhythm.   Pulmonary/Chest: Effort normal.   Abdominal: Distended. Neurological: He is alert. Skin: Skin is warm, dry and intact. A:  -81-year-old male who presents with abdominal pain and nausea. P:  - given the presentation, will check CBC for marked leukocytosis  - CMP for liver enzyme elevation that could signal cholecystitis, biliary obstructive disease. Check RFTs for ALISHA / markers of dehydration.  - Lipase given abdominal pain to evaluate specifically for pancreatitis. - Lastly, will consider abdominal imaging.  - CT AP w Contrast: r/o cholecystitis, bowel obstruction or other acute abdominal pathology. Would also demonstrate signs of pyelonephritis, cystitis. - Disposition per workup. - 13 point ROS was performed and all are normal unless stated in the history above. - Nursing note reviewed. Vitals reviewed. - Orders placed by myself and/or advanced practitioner / resident.    - Previous chart was reviewed  - No language barrier.   - History obtained from patient. - There are no limitations to the history obtained. - Critical care time: Not applicable for this patient.           Medications   metroNIDAZOLE (FLAGYL) IVPB (premix) 500 mg 100 mL (0 mg Intravenous Stopped 7/11/23 0404)   ketorolac (FOR EMS ONLY) (TORADOL) injection 30 mg (0 mg Does not apply Given to EMS 7/11/23 0108)   ondansetron (FOR EMS ONLY) (ZOFRAN) 4 mg/2 mL injection 4 mg (0 mg Does not apply Given to EMS 7/11/23 0106)   HYDROmorphone (DILAUDID) injection 0.5 mg (0.5 mg Intravenous Given 7/11/23 0147)   iohexol (OMNIPAQUE) 350 MG/ML injection (SINGLE-DOSE) 100 mL (100 mL Intravenous Given 7/11/23 0236)   cefepime (MAXIPIME) IVPB (premix in dextrose) 2,000 mg 50 mL (0 mg Intravenous Stopped 7/11/23 0332)   multi-electrolyte (ISOLYTE-S PH 7.4) bolus 500 mL (500 mL Intravenous New Bag 7/11/23 0405)   HYDROmorphone (DILAUDID) injection 0.5 mg (0.5 mg Intravenous Given 7/11/23 0435)     CT abdomen pelvis with contrast   Final Result      Edema and irregular enhancement of the cecum with small adjacent foci of portal venous gas, concerning for bowel ischemia. Thinning along the medial wall of the cecum may suggest an area of bowel necrosis.             I personally discussed this study with Claudene Lane on 7/11/2023 3:11 AM.            Workstation performed: [id number]           Orders Placed This Encounter   Procedures   • Blood culture #1   • Blood culture #2   • CT abdomen pelvis with contrast   • CBC and differential   • Comprehensive metabolic panel   • Lipase   • Lactic acid, plasma (w/reflex if result > 2.0)   • Lactic acid 2 Hours   • Procalcitonin   • Diet NPO   • Insert peripheral IV   • Level 3 - DNAR (Do Not Attempt Resuscitation) and DNI (Do Not Intubate)   • Consult to surgery general   • ECG 12 lead   • INPATIENT ADMISSION     Labs Reviewed   CBC AND DIFFERENTIAL - Abnormal       Result Value Ref Range Status    WBC 12.70 (*) 4.31 - 10.16 Thousand/uL Final    RBC 3.74 (*) 3.88 - 5.62 Million/uL Final    Hemoglobin 12.0  12.0 - 17.0 g/dL Final    Hematocrit 36.2 (*) 36.5 - 49.3 % Final    MCV 97  82 - 98 fL Final    MCH 32.1  26.8 - 34.3 pg Final    MCHC 33.1  31.4 - 37.4 g/dL Final    RDW 13.2  11.6 - 15.1 % Final    MPV 10.2  8.9 - 12.7 fL Final    Platelets 188  212 - 390 Thousands/uL Final    nRBC 0  /100 WBCs Final    Neutrophils Relative 82 (*) 43 - 75 % Final    Immat GRANS % 0  0 - 2 % Final    Lymphocytes Relative 11 (*) 14 - 44 % Final    Monocytes Relative 7  4 - 12 % Final    Eosinophils Relative 0  0 - 6 % Final    Basophils Relative 0  0 - 1 % Final    Neutrophils Absolute 10.30 (*) 1.85 - 7.62 Thousands/µL Final    Immature Grans Absolute 0.04  0.00 - 0.20 Thousand/uL Final    Lymphocytes Absolute 1.39  0.60 - 4.47 Thousands/µL Final    Monocytes Absolute 0.89  0.17 - 1.22 Thousand/µL Final    Eosinophils Absolute 0.04  0.00 - 0.61 Thousand/µL Final    Basophils Absolute 0.04  0.00 - 0.10 Thousands/µL Final   COMPREHENSIVE METABOLIC PANEL - Abnormal    Sodium 133 (*) 135 - 147 mmol/L Final    Potassium 4.3  3.5 - 5.3 mmol/L Final    Chloride 101  96 - 108 mmol/L Final    CO2 20 (*) 21 - 32 mmol/L Final    ANION GAP 12  mmol/L Final    BUN 20  5 - 25 mg/dL Final    Creatinine 1.24  0.60 - 1.30 mg/dL Final    Comment: Standardized to IDMS reference method    Glucose 172 (*) 65 - 140 mg/dL Final    Comment: If the patient is fasting, the ADA then defines impaired fasting glucose as > 100 mg/dL and diabetes as > or equal to 123 mg/dL. Calcium 8.7  8.4 - 10.2 mg/dL Final    AST 32  13 - 39 U/L Final    ALT 27  7 - 52 U/L Final    Comment: Specimen collection should occur prior to Sulfasalazine administration due to the potential for falsely depressed results. Alkaline Phosphatase 91  34 - 104 U/L Final    Total Protein 6.8  6.4 - 8.4 g/dL Final    Albumin 3.5  3.5 - 5.0 g/dL Final    Total Bilirubin 0.74  0.20 - 1.00 mg/dL Final    Comment: Use of this assay is not recommended for patients undergoing treatment with eltrombopag due to the potential for falsely elevated results. N-acetyl-p-benzoquinone imine (metabolite of Acetaminophen) will generate erroneously low results in samples for patients that have taken an overdose of Acetaminophen.     eGFR 52  ml/min/1.73sq m Final    Narrative:     [place] guidelines for Chronic Kidney Disease (CKD):   •  Stage 1 with normal or high GFR (GFR > 90 mL/min/1.73 square meters)  •  Stage 2 Mild CKD (GFR = 60-89 mL/min/1.73 square meters)  •  Stage 3A Moderate CKD (GFR = 45-59 mL/min/1.73 square meters)  •  Stage 3B Moderate CKD (GFR = 30-44 mL/min/1.73 square meters)  •  Stage 4 Severe CKD (GFR = 15-29 mL/min/1.73 square meters)  •  Stage 5 End Stage CKD (GFR <15 mL/min/1.73 square meters)  Note: GFR calculation is accurate only with a steady state creatinine   LACTIC ACID, PLASMA (W/REFLEX IF RESULT > 2.0) - Abnormal    LACTIC ACID 2.4 (*) 0.5 - 2.0 mmol/L Final    Narrative: Result may be elevated if tourniquet was used during collection. LIPASE - Normal    Lipase 29  11 - 82 u/L Final   PROCALCITONIN TEST - Normal    Procalcitonin 0.05  <=0.25 ng/ml Final    Comment: Suspected Lower Respiratory Tract Infection (LRTI):  - LESS than or EQUAL to 0.25 ng/mL:   low likelihood for bacterial LRTI; antibiotics DISCOURAGED.  - GREATER than 0.25 ng/mL:   increased likelihood for bacterial LRTI; antibiotics ENCOURAGED. Suspected Sepsis:  - Strongly consider initiating antibiotics in ALL UNSTABLE patients. - LESS than or EQUAL to 0.5 ng/mL:   low likelihood for bacterial sepsis; antibiotics DISCOURAGED.  - GREATER than 0.5 ng/mL:   increased likelihood for bacterial sepsis; antibiotics ENCOURAGED.  - GREATER than 2 ng/mL:   high risk for severe sepsis / septic shock; antibiotics strongly ENCOURAGED. Decisions on antibiotic use should not be based solely on Procalcitonin (PCT) levels. If PCT is low but uncertainty exists with stopping antibiotics, repeat PCT in 6-24 hours to confirm the low level. If antibiotics are administered (regardless if initial PCT was high or low), repeat PCT every 1-2 days to consider early antibiotic cessation (when GREATER than 80% decrease from the peak OR when PCT drops below designated cutoffs, whichever comes first), so long as the infection is NOT one that typically requires prolonged treatment durations (e.g., bone/joint infections, endocarditis, Staph. aureus bacteremia).     Situations of FALSE-POSITIVE Procalcitonin values:  1) Newborns < 67 hours old  2) Massive stress from severe trauma / burns, major surgery, acute pancreatitis, cardiogenic / hemorrhagic shock, sickle cell crisis, or other organ perfusion abnormalities  3) Malaria and some Candidal infections  4) Treatment with agents that stimulate cytokines (e.g., OKT3, anti-lymphocyte globulins, alemtuzumab, IL-2, granulocyte transfusion [NOT GCSFs])  5) Chronic renal disease causes elevated baseline levels (consider GREATER than 0.75 ng/mL as an abnormal cut-off); initiating HD/CRRT may cause transient decreases  6) Paraneoplastic syndromes from medullary thyroid or SCLC, some forms of vasculitis, and acute hbksx-iw-ehod disease    Situations of FALSE-NEGATIVE Procalcitonin values:  1) Too early in clinical course for PCT to have reached its peak (may repeat in 6-24 hours to confirm low level)  2) Localized infection WITHOUT systemic (SIRS / sepsis) response (e.g., an abscess, osteomyelitis, cystitis)  3) Mycobacteria (e.g., Tuberculosis, MAC)  4) Cystic fibrosis exacerbations     BLOOD CULTURE   BLOOD CULTURE   LACTIC ACID 2 HOUR     Time reflects when diagnosis was documented in both MDM as applicable and the Disposition within this note     Time User Action Codes Description Comment    7/11/2023  3:15 AM Jeffy Antonio Add [R10.9] Abdominal pain     7/11/2023  4:32 AM Felecia Boucher Add [K55.9] Large bowel ischemia ([address])     7/11/2023  4:32 AM Jeffy Antonio Modify [R10.9] Abdominal pain     7/11/2023  4:32 AM Jeffy Antonio Modify [K55.9] Large bowel ischemia [facility])       ED Disposition     ED Disposition   Admit    Condition   Stable    Date/Time   Tue Jul 11, 2023  4:32 AM    Comment   Case was discussed with JOHNSON and the patient's admission status was agreed to be Admission Status: inpatient status to the service of [facility] . Follow-up Information    None       Patient's Medications   Discharge Prescriptions    No medications on file     No discharge procedures on file. Prior to Admission Medications   Prescriptions Last Dose Informant Patient Reported? Taking?    BABY ASPIRIN PO Not Taking Spouse/Significant Other Yes No   Sig: Take 81 mg by mouth daily   Patient not taking: Reported on 1/27/2023   Blood Glucose Monitoring Suppl (ONE TOUCH ULTRA 2) w/Device KIT 7/11/2023 Spouse/Significant Other No Yes   Sig: Use daily To test blood sugar 1x daily DX:diabetes   Eliquis 5 MG 7/10/2023  No Yes   Sig: Take 1 tablet (5 mg total) by mouth 2 (two) times a day   Lancet Devices (OneTouch Delica Plus Lancing) MISC Unknown  No No   Sig: Test once daily for diabetes. Lancets (OneTouch Delica Plus [id number]) MISC Unknown  No No   Sig: Use 1 each in the morning Test once daily for diabetes.    Lancets (OneTouch Delica Plus [id number]) MISC Unknown  No No   Sig: USE TO TEST BLOOD SUGARS ONCE DAILY   Multiple Vitamins-Minerals (multivitamin with minerals) tablet Not Taking Spouse/Significant Other Yes No   Sig: Take 1 tablet by mouth daily   Patient not taking: Reported on 7/11/2023   Pomegranate, [place] granatum, (POMEGRANATE PO) Not Taking Spouse/Significant Other Yes No   Sig: Take by mouth   Patient not taking: Reported on 7/11/2023   TURMERIC PO Not Taking Spouse/Significant Other Yes No   Sig: Take by mouth   Patient not taking: Reported on 7/11/2023   Zoster Vac Recomb Adjuvanted 50 MCG/0.5ML SUSR Not Taking  Yes No   Patient not taking: Reported on 4/11/2023   allopurinol (ZYLOPRIM) 100 mg tablet Past Week  No Yes   Sig: Take 1 tablet (100 mg total) by mouth daily   amLODIPine (NORVASC) 2.5 mg tablet 7/10/2023 Spouse/Significant Other No Yes   Sig: Take 1 tablet (2.5 mg total) by mouth daily   ascorbic acid (VITAMIN C) 500 MG tablet Not Taking Spouse/Significant Other Yes No   Sig: Take 500 mg by mouth daily   Patient not taking: Reported on 7/11/2023   atorvastatin (LIPITOR) 20 mg tablet Past Week  No Yes   Sig: Take 1 tablet (20 mg total) by mouth daily   bumetanide (BUMEX) 1 mg tablet 7/10/2023  No Yes   Sig: Take 1 tablet (1 mg total) by mouth daily   carbidopa-levodopa (SINEMET)  mg per tablet Unknown Spouse/Significant Other Yes No   Sig: carbidopa 25 mg-levodopa 100 mg tablet   TAKE 1 TABLET BY MOUTH THREE TIMES A DAY   clotrimazole-betamethasone (LOTRISONE) 1-0.05 % cream Not Taking Spouse/Significant Other No No   Sig: Apply topically 2 (two) times a day   Patient not taking: Reported on 2023   doxazosin (CARDURA) 4 mg tablet 7/10/2023  No Yes   Sig: Take 1 tablet (4 mg total) by mouth daily at bedtime   fluticasone (FLONASE) 50 mcg/act nasal spray Unknown  No No   Si spray into each nostril 2 (two) times a day   glucose blood (OneTouch Ultra) test strip 2023 Self Yes Yes   Sig: Use 1 each daily as needed TEST TWICE DAILY AS NEEDED   glucose blood (OneTouch Ultra) test strip 2023  No Yes   Sig: Use 1 each daily Test blood sugar   glucose blood test strip 2023 Spouse/Significant Other Yes Yes   Sig: OneTouch Ultra Test strips   glucose blood test strip 2023  Yes Yes   Sig: OneTouch Ultra Test strips   USE 1 EACH DAILY TO TEST BLOOD SUGAR   hydrOXYzine HCL (ATARAX) 10 mg tablet 7/10/2023  No Yes   Sig: TAKE 1 TABLET (10 MG TOTAL) BY MOUTH IN THE MORNING   ipratropium (ATROVENT) 0.03 % nasal spray  Self No No   Si spray into each nostril 3 (three) times a day   Patient not taking: Reported on 2021   ketoconazole (NIZORAL) 2 % cream Not Taking Spouse/Significant Other Yes No   Patient not taking: Reported on 2023   metFORMIN (GLUCOPHAGE-XR) 500 mg 24 hr tablet Not Taking  No No   Sig: Take 1 tablet (500 mg total) by mouth daily with dinner   Patient not taking: Reported on 2023   miconazole (MICOTIN) 2 % powder Not Taking Spouse/Significant Other No No   Sig: Apply topically as needed for itching   Patient not taking: Reported on 2023   mometasone (ELOCON) 0.1 % lotion   No No   Sig: One drop affected ear canal daily at bedtime when necessary itching   Patient not taking: Reported on 2023   nitroglycerin (NITROSTAT) 0.4 mg SL tablet Not Taking Spouse/Significant Other No No   Sig: Place 1 tablet (0.4 mg total) under the tongue every 5 (five) minutes as needed for chest pain   Patient not taking: Reported on 2023   nystatin (MYCOSTATIN) cream Not Taking Spouse/Significant Other No No   Sig: Apply topically 2 (two) times a day Patient not taking: Reported on 4/11/2023   nystatin (MYCOSTATIN) powder Not Taking Spouse/Significant Other No No   Sig: Apply topically 3 (three) times a day   Patient not taking: Reported on 7/11/2023   pantoprazole (PROTONIX) 40 mg tablet 7/10/2023  No Yes   Sig: Take 1 tablet (40 mg total) by mouth daily at bedtime   pimecrolimus (ELIDEL) 1 % cream Not Taking Spouse/Significant Other Yes No   Patient not taking: Reported on 4/11/2023   telmisartan (MICARDIS) 40 mg tablet 7/10/2023  No Yes   Sig: TAKE 1 TABLET BY MOUTH EVERY DAY   triamcinolone (KENALOG) 0.1 % cream Not Taking Spouse/Significant Other Yes No   Patient not taking: Reported on 4/11/2023      Facility-Administered Medications: None       Portions of the record may have been created with voice recognition software. Occasional wrong word or "sound a like" substitutions may have occurred due to the inherent limitations of voice recognition software. Read the chart carefully and recognize, using context, where substitutions have occurred.       ED Course         Critical Care Time  Procedures

## 2023-07-11 NOTE — ASSESSMENT & PLAN NOTE
Concern by ct w/edema and irregular enhancement involving the cecum with small adjacent foci of portal venous gas. Along the medial wall of the cecum the bowel wall is thin and nearly imperceptible concerning for bowel necrosis. No pneumoperitoneum.  -abd w/guarding and some distention but still somewhat soft. Lactic acid 2.1 vs stable.  -Case was d/w surgery with concern for likely poor prognosis w/o aggressive surgical intervention for which pt would be high risk (hx of cad, chf, cognitive decline w/vascular parkinsonism. Pt demonstrates significant short term memory deficits and cannot recall when is pain began) to which pt's wife and son report he would not want as they were not anticipating revision of a bioprosthetic aortic valve earlier this year due to his surgical risks and postop quality of life.  -Pending clinical course possible transition to hospice, this was discussed by surgery team that a course of conservative mgmt likely may not be enough with the degree of cecal effacement and decline during pt   stay. -For now level 3 dni/dnr trend lactic acid abx w/cefepime/flagyl.   No role for ngt per d/w surge resident or endoscopic decompression given risk of perforation

## 2023-07-11 NOTE — CONSULTS
AUTHORIZATION FOR SURGICAL OPERATION OR OTHER PROCEDURE    1.  I hereby authorize Dr. Prisca Wheeler, and [address] [place] [address] staff assigned to my case to perform the following operation and/or procedure at the [address] [place] [address]:    ________________________ Consult: General surgery  Richie Gomez 81 y.o. male MRN: [id number]  Unit/Bed#: [place] [address] -01 Encounter: [id number]        Assessment/Plan     Assessment:  Patient is a 81 y.o. male with pmhx of cad, chronic diastolic chf, aortic valve s/p bioprosthetic valve repair, vascular parkinsonism, w/cognitive impairment, niddm, htn dm ckd 3 afib on eliquis who presented with concern for cecal ischemia and impending perforation. This problem demands surgical intervention which was discussed extensively with family; given patient's functional status goals of care with the patient and the family like to defer surgical management at this time. They understand that without surgical intervention it is likely the patient will decompensate and succumb to this disease process. Afebrile, VSS  WBC: 12.7; Hb; LA: 1.7 from 2.4  CTAP showed Edema and irregular enhancement of the cecum with small adjacent foci of portal venous gas, concerning for bowel ischemia. Thinning along the medial wall of the cecum may suggest an area of bowel necrosis    Plan:  -No surgical intervention given patient and family preference  -High risk for surgical intervention; no role for endoscopic intervention  - Admission Tessal[place]  - Antibiotics and IV fluids  - Patient and family would prefer to transition to comfort care if he decompensates  - Rest of care per primary    History of Present Illness     HPI:  Richie Gomez is a 81 y.o. male with pmhx of cad, chronic diastolic chf, aortic valve s/p bioprosthetic valve repair, vascular parkinsonism, w/cognitive impairment, niddm, htn dm ckd 3 afib on Eliquis who presents with 5 hours of cute onset abdominal pain and distention after eating dinner. He and the family report that this has never happened before and that it is progressive in nature. He denies nausea and vomiting. He denies fevers, chills, chest pain, shortness of breath.   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 Since then he has been dependent on his wife for his activities of daily living. He was offered aortic valve revision given progressive aortic regurgitation and the patient/family decided at the would defer surgical intervention given the patient's functional status and goals of care.     Review of Systems  As above, otherwise negative  Inpatient consult to Acute Care Surgery  Consult performed by: Mateo Stanford MD  Consult ordered by: Javon Traylor MD          Historical Information   Past Medical History:   Diagnosis Date   • Aortic valve regurgitation 01/26/2017   • Arthritis    • Cancer [facility])    • Coronary artery disease    • Coronary artery disease with angina pectoris ([facility]) 3/19/2019   • Edema 12/02/2016   • GERD (gastroesophageal reflux disease)    • Heart disease    • Heart valve disorder    • Heart valve replaced    • History of basal cell cancer    • HL (hearing loss)     Wears hearing aids   • Hx of tissue graft 03/29/2017   • Hyperlipemia 01/26/2017   • Hypertension    • Non-rheumatic mitral regurgitation 10/21/2016   • Obesity    • Right bundle branch block (RBBB), 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     Past Surgical History:   Procedure Laterality Date   • ADENOIDECTOMY     • APPENDECTOMY     • CARDIAC SURGERY     • CARDIAC VALVE REPLACEMENT     • EYE SURGERY     • HERNIA REPAIR     • TONSILLECTOMY     • VASECTOMY       Social History   Social History     Substance and Sexual Activity   Alcohol Use No     Social History     Substance and Sexual Activity   Drug Use No     Social History     Tobacco Use   Smoking Status Former   • Packs/day: 0.50   • Types: Cigarettes, Cigars   Smokeless Tobacco Former   Tobacco Comments    quit 10 years ago, NEVER A SMOKER AS PER [employer]     Family History: non-contributory    Meds/Allergies   all medications and allergies reviewed  Allergies   Allergen Reactions   • Orphenadrine GI Intolerance       Objective   First Vitals:   Blood Pressure: 155/67 (07/11/23 0100)  Pulse: 69 (07/11/23 0100)  Temperature: 98.4 °F (36.9 °C) (07/11/23 0100)  Temp Source: Oral (07/11/23 0100)  Respirations: 20 (07/11/23 0100)  Weight - Scale: 97.2 kg (214 lb 4.6 oz) (07/11/23 0100)  SpO2: 98 % (07/11/23 0100)    Current Vitals:   Blood Pressure: 125/57 (07/11/23 0805)  Pulse: 78 (07/11/23 0805)  Temperature: 98.2 °F (36.8 °C) (07/11/23 0805)  Temp Source: Oral (07/11/23 0100)  Respirations: 18 (07/11/23 0805)  Weight - Scale: 97.2 kg (214 lb 4.6 oz) (07/11/23 0100)  SpO2: 96 % (07/11/23 0805)      Intake/Output Summary (Last 24 hours) at 7/11/2023 0839  Last data filed at 7/11/2023 0510  Gross per 24 hour   Intake 650 ml   Output --   Net 650 ml       Invasive Devices     None                 General: NAD  Skin: Warm, dry, anicteric  HEENT: Normocephalic, atraumatic  CV: RRR  Pulm:  Nonlabored breathing on room air  Abd: Firm, diffusely tender worse on the right side with voluntary guarding, moderately distended and tympanic  MSK: Symmetric, no edema, no tenderness, no deformity  Neuro: AOx3, GCS 15    Lab Results: I have personally reviewed pertinent lab results. Imaging: I have personally reviewed pertinent reports. and I have personally reviewed pertinent films in PACS  EKG, Pathology, and Other Studies: I have personally reviewed pertinent reports.       Code Status: Level 3 - DNAR and DNI  Advance Directive and Living Will:      Power of :    POLST:

## 2023-07-11 NOTE — CASE MANAGEMENT
Case Management Discharge Planning Note    Patient name Jennifer Foot  Location [address] [place] [address]/[facility] 2 Nikunj Chase* MRN [id number]  : 1935 Date 2023       Current Admission Date: 2023  Current Admission Diagnosis:Colitis   Patient Active Problem List    Diagnosis Date Noted   • Colitis 2023   • Depression, recurrent ([address]) 2022   • Paroxysmal atrial fibrillation ([address]) 2022   • Stage 3a chronic kidney disease ([address]) 2022   • Essential hypertension 2022   • 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 2022   • Cognitive decline 2022   • Vascular parkinsonism ([address]) 2022   • Ambulatory dysfunction 2021   • Weakness 2021   • Pacemaker 2021   • Bradycardia 2021   • Peripheral artery insufficiency ([address]) 2020   • Atherosclerosis of native artery of extremity ([address]) 2020   • DM type 2 with diabetic peripheral neuropathy ([address]) 2020   • Atherosclerosis 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 ([address]) 2020   • Chronic diastolic congestive heart failure, NYHA class 2 ([address]) 2019   • 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 2018   • Spinal stenosis of lumbar region without neurogenic claudication 2018   • Hyponatremia with excess extracellular fluid volume 08/10/2018   • Chest pain in adult 2018   • Non-rheumatic mitral regurgitation 08/10/2017   • Aortic prosthetic valve regurgitation 08/10/2017   • History of heart valve replacement with tissue graft 2017   • Hyperlipidemia 2017   • Hypertensive heart disease with congestive heart failure ([address]) 2016   • Peripheral edema 2016   • Edema 2016   • 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/2016   • Multiple vessel coronary artery disease 2016      LOS (days): 0  Geometric Mean LOS (GMLOS) (days): 5.20  Days to GMLOS:4.8     OBJECTIVE:  Risk of Unplanned Readmission Score: 9.51 Current admission status: Inpatient   Preferred Pharmacy:   [facility] [address] [place] [address] [address]  [place]  Phone: [phone] Fax: [phone]    Primary Care Provider: Lord Patricia MD    Primary Insurance: MEDICARE  Secondary Insurance: [facility]    DISCHARGE DETAILS:                            Additional Comments:  Hospice RN talked with spouse and spouse wants to wait until tomorrow to make decision.  Hospice RN feels pt would qualify for [address]

## 2023-07-11 NOTE — ASSESSMENT & PLAN NOTE
Wt Readings from Last 3 Encounters:   07/11/23 97.2 kg (214 lb 4.6 oz)   05/18/23 97.1 kg (214 lb)   04/11/23 97.1 kg (214 lb)     Weight stable on bumex  Hold bumex, I/os for now while level 3

## 2023-07-11 NOTE — ED PROVIDER NOTES
History  Chief Complaint   Patient presents with   • Abdominal Pain     Pt brought in via EMS from home for generalized abdominal pain that started this evening. Pt reports nausea, denies v/d and bloody stools. 15 of toradol given and 4 zofran given per EMS. Blood sugar was 159. Pt reports having hx of same issues. 81 y.o M w/ h/o AVR, CAD, HTN, HLD, appendectomy, umbilical hernia repair, presents to the ED due to 2 days of progressively worsening abdominal pain and distention. Wife provides majority of history. He has been complaining of intermittent abdominal pains, worse after eating. Today he started groaning in pain so she called EMS. He denies nausea/vomiting. He has had normal stools, last was earlier this AM. No f/c, cp/sob, or other complaints at this time. Prior to Admission Medications   Prescriptions Last Dose Informant Patient Reported? Taking? BABY ASPIRIN PO Not Taking Spouse/Significant Other Yes No   Sig: Take 81 mg by mouth daily   Patient not taking: Reported on 1/27/2023   Blood Glucose Monitoring Suppl (ONE TOUCH ULTRA 2) w/Device KIT 7/11/2023 Spouse/Significant Other No Yes   Sig: Use daily To test blood sugar 1x daily DX:diabetes   Eliquis 5 MG 7/10/2023  No Yes   Sig: Take 1 tablet (5 mg total) by mouth 2 (two) times a day   Lancet Devices (OneTouch Delica Plus Lancing) MISC Unknown  No No   Sig: Test once daily for diabetes. Lancets (OneTouch Delica Plus [id number]) MISC Unknown  No No   Sig: Use 1 each in the morning Test once daily for diabetes.    Lancets (OneTouch Delica Plus [id number]) MISC Unknown  No No   Sig: USE TO TEST BLOOD SUGARS ONCE DAILY   Multiple Vitamins-Minerals (multivitamin with minerals) tablet Not Taking Spouse/Significant Other Yes No   Sig: Take 1 tablet by mouth daily   Patient not taking: Reported on 7/11/2023   Pomegranate, [place] granatum, (POMEGRANATE PO) Not Taking Spouse/Significant Other Yes No   Sig: Take by mouth   Patient not taking: Reported on 2023   TURMERIC PO Not Taking Spouse/Significant Other Yes No   Sig: Take by mouth   Patient not taking: Reported on 2023   Zoster Vac Recomb Adjuvanted 50 MCG/0.5ML SUSR Not Taking  Yes No   Patient not taking: Reported on 2023   allopurinol (ZYLOPRIM) 100 mg tablet Past Week  No Yes   Sig: Take 1 tablet (100 mg total) by mouth daily   amLODIPine (NORVASC) 2.5 mg tablet 7/10/2023 Spouse/Significant Other No Yes   Sig: Take 1 tablet (2.5 mg total) by mouth daily   ascorbic acid (VITAMIN C) 500 MG tablet Not Taking Spouse/Significant Other Yes No   Sig: Take 500 mg by mouth daily   Patient not taking: Reported on 2023   atorvastatin (LIPITOR) 20 mg tablet Past Week  No Yes   Sig: Take 1 tablet (20 mg total) by mouth daily   bumetanide (BUMEX) 1 mg tablet 7/10/2023  No Yes   Sig: Take 1 tablet (1 mg total) by mouth daily   carbidopa-levodopa (SINEMET)  mg per tablet Unknown Spouse/Significant Other Yes No   Sig: carbidopa 25 mg-levodopa 100 mg tablet   TAKE 1 TABLET BY MOUTH THREE TIMES A DAY   clotrimazole-betamethasone (LOTRISONE) 1-0.05 % cream Not Taking Spouse/Significant Other No No   Sig: Apply topically 2 (two) times a day   Patient not taking: Reported on 2023   doxazosin (CARDURA) 4 mg tablet 7/10/2023  No Yes   Sig: Take 1 tablet (4 mg total) by mouth daily at bedtime   fluticasone (FLONASE) 50 mcg/act nasal spray Unknown  No No   Si spray into each nostril 2 (two) times a day   glucose blood (OneTouch Ultra) test strip 2023 Self Yes Yes   Sig: Use 1 each daily as needed TEST TWICE DAILY AS NEEDED   glucose blood (OneTouch Ultra) test strip 2023  No Yes   Sig: Use 1 each daily Test blood sugar   glucose blood test strip 2023 Spouse/Significant Other Yes Yes   Sig: OneTouch Ultra Test strips   glucose blood test strip 2023  Yes Yes   Sig: OneTouch Ultra Test strips   USE 1 EACH DAILY TO TEST BLOOD SUGAR   hydrOXYzine HCL (ATARAX) 10 mg tablet 7/10/2023 No Yes   Sig: TAKE 1 TABLET (10 MG TOTAL) BY MOUTH IN THE MORNING   ipratropium (ATROVENT) 0.03 % nasal spray  Self No No   Si spray into each nostril 3 (three) times a day   Patient not taking: Reported on 2021   ketoconazole (NIZORAL) 2 % cream Not Taking Spouse/Significant Other Yes No   Patient not taking: Reported on 2023   metFORMIN (GLUCOPHAGE-XR) 500 mg 24 hr tablet Not Taking  No No   Sig: Take 1 tablet (500 mg total) by mouth daily with dinner   Patient not taking: Reported on 2023   miconazole (MICOTIN) 2 % powder Not Taking Spouse/Significant Other No No   Sig: Apply topically as needed for itching   Patient not taking: Reported on 2023   mometasone (ELOCON) 0.1 % lotion   No No   Sig: One drop affected ear canal daily at bedtime when necessary itching   Patient not taking: Reported on 2023   nitroglycerin (NITROSTAT) 0.4 mg SL tablet Not Taking Spouse/Significant Other No No   Sig: Place 1 tablet (0.4 mg total) under the tongue every 5 (five) minutes as needed for chest pain   Patient not taking: Reported on 2023   nystatin (MYCOSTATIN) cream Not Taking Spouse/Significant Other No No   Sig: Apply topically 2 (two) times a day   Patient not taking: Reported on 2023   nystatin (MYCOSTATIN) powder Not Taking Spouse/Significant Other No No   Sig: Apply topically 3 (three) times a day   Patient not taking: Reported on 2023   pantoprazole (PROTONIX) 40 mg tablet 7/10/2023  No Yes   Sig: Take 1 tablet (40 mg total) by mouth daily at bedtime   pimecrolimus (ELIDEL) 1 % cream Not Taking Spouse/Significant Other Yes No   Patient not taking: Reported on 2023   telmisartan (MICARDIS) 40 mg tablet 7/10/2023  No Yes   Sig: TAKE 1 TABLET BY MOUTH EVERY DAY   triamcinolone (KENALOG) 0.1 % cream Not Taking Spouse/Significant Other Yes No   Patient not taking: Reported on 2023      Facility-Administered Medications: None       Past Medical History:   Diagnosis Date • Aortic valve regurgitation 01/26/2017   • Arthritis    • Cancer [facility])    • Coronary artery disease    • Coronary artery disease with angina pectoris ([address]) 3/19/2019   • Edema 12/02/2016   • GERD (gastroesophageal reflux disease)    • Heart disease    • Heart valve disorder    • Heart valve replaced    • History of basal cell cancer    • HL (hearing loss)     Wears hearing aids   • Hx of tissue graft 03/29/2017   • Hyperlipemia 01/26/2017   • Hypertension    • Non-rheumatic mitral regurgitation 10/21/2016   • Obesity    • Right bundle branch block (RBBB), 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       Past Surgical History:   Procedure Laterality Date   • ADENOIDECTOMY     • APPENDECTOMY     • CARDIAC SURGERY     • CARDIAC VALVE REPLACEMENT     • EYE SURGERY     • HERNIA REPAIR     • TONSILLECTOMY     • VASECTOMY         Family History   Problem Relation Age of Onset   • Diabetes Mother    • Alcohol abuse Brother    • Bipolar disorder Brother    • Hypertension Brother    • Kidney disease Brother    • Heart disease Family    • Diabetes type II Family      I have reviewed and agree with the history as documented. E-Cigarette/Vaping   • E-Cigarette Use Never User      E-Cigarette/Vaping Substances   • Nicotine No    • THC No    • CBD No    • Flavoring No    • Other No    • Unknown No      Social History     Tobacco Use   • Smoking status: Former     Packs/day: 0.50     Types: Cigarettes, Cigars   • Smokeless tobacco: Former   • Tobacco comments:     quit 10 years ago, NEVER A SMOKER AS PER [employer]   Vaping Use   • Vaping Use: Never used   Substance Use Topics   • Alcohol use: No   • Drug use: No        Review of Systems   All other systems reviewed and are negative.       Physical Exam  ED Triage Vitals   Temperature Pulse Respirations Blood Pressure SpO2   07/11/23 0100 07/11/23 0100 07/11/23 0100 07/11/23 0100 07/11/23 0100   98.4 °F (36.9 °C) 69 20 155/67 98 % Temp Source Heart Rate Source Patient Position - Orthostatic VS BP Location FiO2 (%)   07/11/23 0100 07/11/23 0100 07/11/23 0100 07/11/23 0100 --   Oral Monitor Lying Right arm       Pain Score       07/11/23 0147       10 - Worst Possible Pain             Orthostatic Vital Signs  Vitals:    07/11/23 0100 07/11/23 0145 07/11/23 0334 07/11/23 0434   BP: 155/67 163/89 139/64 135/59   Pulse: 69 64 89 86   Patient Position - Orthostatic VS: Lying  Lying Lying       Physical Exam  Vitals and nursing note reviewed. Constitutional:       General: He is not in acute distress. Appearance: He is well-developed. HENT:      Head: Normocephalic and atraumatic. Eyes:      Conjunctiva/sclera: Conjunctivae normal.   Cardiovascular:      Rate and Rhythm: Normal rate and regular rhythm. Heart sounds: No murmur heard. Pulmonary:      Effort: Pulmonary effort is normal. No respiratory distress. Breath sounds: Normal breath sounds. Abdominal:      General: There is distension. Tenderness: There is abdominal tenderness (diffuse). Musculoskeletal:         General: No swelling. Cervical back: Neck supple. Skin:     General: Skin is warm and dry. Capillary Refill: Capillary refill takes less than 2 seconds. Neurological:      General: No focal deficit present. Mental Status: He is alert.    Psychiatric:         Mood and Affect: Mood normal.         ED Medications  Medications   metroNIDAZOLE (FLAGYL) IVPB (premix) 500 mg 100 mL (0 mg Intravenous Stopped 7/11/23 0404)   multi-electrolyte (ISOLYTE-S PH 7.4) bolus 500 mL (500 mL Intravenous New Bag 7/11/23 0405)   ketorolac (FOR EMS ONLY) (TORADOL) injection 30 mg (0 mg Does not apply Given to EMS 7/11/23 0108)   ondansetron (FOR EMS ONLY) (ZOFRAN) 4 mg/2 mL injection 4 mg (0 mg Does not apply Given to EMS 7/11/23 0106)   HYDROmorphone (DILAUDID) injection 0.5 mg (0.5 mg Intravenous Given 7/11/23 0147)   iohexol (OMNIPAQUE) 350 MG/ML injection (SINGLE-DOSE) 100 mL (100 mL Intravenous Given 7/11/23 0236)   cefepime (MAXIPIME) IVPB (premix in dextrose) 2,000 mg 50 mL (0 mg Intravenous Stopped 7/11/23 0332)   HYDROmorphone (DILAUDID) injection 0.5 mg (0.5 mg Intravenous Given 7/11/23 0435)       Diagnostic Studies  Results Reviewed     Procedure Component Value Units Date/Time    Procalcitonin [[id number]] Collected: 07/11/23 0125    Lab Status: In process Specimen: Blood from Arm, Left Updated: 07/11/23 0438    Lactic acid, plasma (w/reflex if result > 2.0) [[id number]]  (Abnormal) Collected: 07/11/23 0301    Lab Status: Final result Specimen: Blood from Arm, Right Updated: 07/11/23 0340     LACTIC ACID 2.4 mmol/L     Narrative:      Result may be elevated if tourniquet was used during collection. Lactic acid 2 Hours [[id number]]     Lab Status: No result Specimen: Blood     Blood culture #2 [[id number]] Collected: 07/11/23 0301    Lab Status: In process Specimen: Blood from Arm, Right Updated: 07/11/23 0307    Blood culture #1 [[id number]] Collected: 07/11/23 0303    Lab Status:  In process Specimen: Blood from Arm, Left Updated: 07/11/23 0307    Comprehensive metabolic panel [[id number]]  (Abnormal) Collected: 07/11/23 0125    Lab Status: Final result Specimen: Blood from Arm, Left Updated: 07/11/23 0152     Sodium 133 mmol/L      Potassium 4.3 mmol/L      Chloride 101 mmol/L      CO2 20 mmol/L      ANION GAP 12 mmol/L      BUN 20 mg/dL      Creatinine 1.24 mg/dL      Glucose 172 mg/dL      Calcium 8.7 mg/dL      AST 32 U/L      ALT 27 U/L      Alkaline Phosphatase 91 U/L      Total Protein 6.8 g/dL      Albumin 3.5 g/dL      Total Bilirubin 0.74 mg/dL      eGFR 52 ml/min/1.73sq m     Narrative:      [place] guidelines for Chronic Kidney Disease (CKD):   •  Stage 1 with normal or high GFR (GFR > 90 mL/min/1.73 square meters)  •  Stage 2 Mild CKD (GFR = 60-89 mL/min/1.73 square meters)  •  Stage 3A Moderate CKD (GFR = 45-59 mL/min/1.73 square meters)  •  Stage 3B Moderate CKD (GFR = 30-44 mL/min/1.73 square meters)  •  Stage 4 Severe CKD (GFR = 15-29 mL/min/1.73 square meters)  •  Stage 5 End Stage CKD (GFR <15 mL/min/1.73 square meters)  Note: GFR calculation is accurate only with a steady state creatinine    Lipase [[id number]]  (Normal) Collected: 07/11/23 0125    Lab Status: Final result Specimen: Blood from Arm, Left Updated: 07/11/23 0152     Lipase 29 u/L     CBC and differential [[id number]]  (Abnormal) Collected: 07/11/23 0125    Lab Status: Final result Specimen: Blood from Arm, Left Updated: 07/11/23 0136     WBC 12.70 Thousand/uL      RBC 3.74 Million/uL      Hemoglobin 12.0 g/dL      Hematocrit 36.2 %      MCV 97 fL      MCH 32.1 pg      MCHC 33.1 g/dL      RDW 13.2 %      MPV 10.2 fL      Platelets 080 Thousands/uL      nRBC 0 /100 WBCs      Neutrophils Relative 82 %      Immat GRANS % 0 %      Lymphocytes Relative 11 %      Monocytes Relative 7 %      Eosinophils Relative 0 %      Basophils Relative 0 %      Neutrophils Absolute 10.30 Thousands/µL      Immature Grans Absolute 0.04 Thousand/uL      Lymphocytes Absolute 1.39 Thousands/µL      Monocytes Absolute 0.89 Thousand/µL      Eosinophils Absolute 0.04 Thousand/µL      Basophils Absolute 0.04 Thousands/µL                  CT abdomen pelvis with contrast   Final Result by Taz Laguna DO (07/11 6836)      Edema and irregular enhancement of the cecum with small adjacent foci of portal venous gas, concerning for bowel ischemia. Thinning along the medial wall of the cecum may suggest an area of bowel necrosis.             I personally discussed this study with Ashleigh Cleveland on 7/11/2023 3:11 AM.            Workstation performed: [id number]               Procedures  Procedures      ED Course  ED Course as of 07/11/23 0453   Tue Jul 11, 2023   0310 C/f Bowel ischemia + pneumatosis   0345 LACTIC ACID(!!): 2.4  Giving 500 IVF due to h/o CHF                             SBIRT 22yo+    Flowsheet Row Most Recent Value   Initial Alcohol Screen: US AUDIT-C     1. How often do you have a drink containing alcohol? 0 Filed at: 07/11/2023 0104   2. How many drinks containing alcohol do you have on a typical day you are drinking? 0 Filed at: 07/11/2023 0104   3b. FEMALE Any Age, or MALE 65+: How often do you have 4 or more drinks on one occassion? 0 Filed at: 07/11/2023 0104   Audit-C Score 0 Filed at: 07/11/2023 0104   JIM: How many times in the past year have you. .. Used an illegal drug or used a prescription medication for non-medical reasons? Never Filed at: 07/11/2023 0104                Medical Decision Making  81-year-old male presented emergency department due to cute onset abdominal pain and distention. Concerning for possible intra-abdominal pathology requiring surgical intervention. Will obtain labs, CT abdomen pelvis to evaluate. Will treat pain and any other symptoms. Work-up overall concerning for possible bowel ischemia. Surgery was consulted and findings were discussed with patient/family. Patient/family noting that patient would not want to have any invasive interventions done such as intubation, CPR, and would likely refuse surgery. Following surgical evaluation, patient and family decided to manage this medically with palliative consult. Patient was admitted to internal medicine for further evaluation and management. Amount and/or Complexity of Data Reviewed  Labs: ordered. Decision-making details documented in ED Course. Radiology: ordered. Risk  Prescription drug management. Decision regarding hospitalization.             Disposition  Final diagnoses:   Abdominal pain   Large bowel ischemia ([address])     Time reflects when diagnosis was documented in both MDM as applicable and the Disposition within this note     Time User Action Codes Description Comment    7/11/2023  3:15 AM June Dyer Add [R10.9] Abdominal pain     7/11/2023  4:32 AM Urszula Jeovanny Add [K55.9] Large bowel ischemia ([address])     7/11/2023  4:32 AM Agatha Ramirez Modify [R10.9] Abdominal pain     7/11/2023  4:32 AM Agatha Ramirez Modify [K55.9] Large bowel ischemia [facility])       ED Disposition     ED Disposition   Admit    Condition   Stable    Date/Time   Tue Jul 11, 2023  4:32 AM    Comment   Case was discussed with JOHNSON and the patient's admission status was agreed to be Admission Status: inpatient status to the service of [facility] . Follow-up Information    None         Patient's Medications   Discharge Prescriptions    No medications on file     No discharge procedures on file. PDMP Review     None           ED Provider  Attending physically available and evaluated Mikey Machado. I managed the patient along with the ED Attending.     Electronically Signed by         Agatha Ramirez MD  07/11/23 0845

## 2023-07-11 NOTE — ACP (ADVANCE CARE PLANNING)
Advanced Care Planning Progress Note    Serious Illness Conversation    1. What is your understanding now of where you are with your illness? Patient is confused and lethargic with perforated colon. Discussion is with family at the [facility]. Patient has a perforated colon and peritonitis which is life threatening. Family does not want surgery as per the patient previous stated wishes. They want him to be comfortable. They are accepting of comfort measures and hospice knowing the death is likely soon. 2. How much information about what is likely to be ahead with your illness would you like to have? 3. What did you (clinician) communicate to the patient? Without surgery death is likely, but with surgery, he may not survive that either. Family has had discussions with the patient in the past.   He said that he never wants major surgery due to his previous "heart conditions."   He would rather die. I did discuss, then in that case, we need to make him as comfortable as possible. This is what the family wants. Palliative care will assist with pain control. Start IV dilaudid drip     4. If your health situation worsens, what are your most important goals? Family only wants comfort measures. 5. What are the biggest fears and worries about the future and your health? 6. What abilities are so critical to your life that you cannot imagine living without them? 7. What gives you strength as you think about the future with your illness? 8. If you become sicker, how much are you willing to go through for the possibility of gaining more time? Patient is DNR and will change to comfort care  9. How much does your proxy and family know about your priorities and wishes? How does this plan sound to you? I will do everything I can to help you through this. Advanced directives         Aneesh Plunkett, DO       Serious Illness Conversation    1.  What is your understanding now of where you are with your illness? Patient is confused and lethargic with perforated colon. Discussion is with family at the [facility]. Patient has a perforated colon and peritonitis which is life threatening. Family does not want surgery as per the patient previous stated wishes. They want him to be comfortable. They are accepting of comfort measures and hospice knowing the death is likely soon. 2. How much information about what is likely to be ahead with your illness would you like to have? 3. What did you (clinician) communicate to the patient? Without surgery death is likely, but with surgery, he may not survive that either. Family has had discussions with the patient in the past.   He said that he never wants major surgery due to his previous "heart conditions."   He would rather die. I did discuss, then in that case, we need to make him as comfortable as possible. This is what the family wants. Palliative care will assist with pain control. Start IV dilaudid drip     4. If your health situation worsens, what are your most important goals? Family only wants comfort measures. 5. What are the biggest fears and worries about the future and your health? 6. What abilities are so critical to your life that you cannot imagine living without them? 7. What gives you strength as you think about the future with your illness? 8. If you become sicker, how much are you willing to go through for the possibility of gaining more time? Patient is DNR and will change to comfort care  9. How much does your proxy and family know about your priorities and wishes? How does this plan sound to you? I will do everything I can to help you through this.         Advanced directives

## 2023-07-11 NOTE — PLAN OF CARE
Problem: Potential for Falls  Goal: Patient will remain free of falls  Description: INTERVENTIONS:  - Educate patient/family on patient safety including physical limitations  - Instruct patient to call for assistance with activity   - Consult OT/PT to assist with strengthening/mobility   - Keep Call bell within reach  - Keep bed low and locked with side rails adjusted as appropriate  - Keep care items and personal belongings within reach  - Initiate and maintain comfort rounds  - Make Fall Risk Sign visible to staff  - Offer Toileting every 2 Hours, in advance of need  - Initiate/Maintain bed /chair alarm  - Obtain necessary fall risk management equipment: bed rails  - Apply yellow socks and bracelet for high fall risk patients  - Consider moving patient to room near nurses station  Outcome: Progressing     Problem: PAIN - ADULT  Goal: Verbalizes/displays adequate comfort level or baseline comfort level  Description: Interventions:  - Encourage patient to monitor pain and request assistance  - Assess pain using appropriate pain scale  - Administer analgesics based on type and severity of pain and evaluate response  - Implement non-pharmacological measures as appropriate and evaluate response  - Consider cultural and social influences on pain and pain management  - Notify physician/advanced practitioner if interventions unsuccessful or patient reports new pain  Outcome: Progressing     Problem: INFECTION - ADULT  Goal: Absence or prevention of progression during hospitalization  Description: INTERVENTIONS:  - Assess and monitor for signs and symptoms of infection  - Monitor lab/diagnostic results  - Monitor all insertion sites, i.e. indwelling lines, tubes, and drains  - Monitor endotracheal if appropriate and nasal secretions for changes in amount and color  - [place] appropriate cooling/warming therapies per order  - Administer medications as ordered  - Instruct and encourage patient and family to use good hand hygiene technique  - Identify and instruct in appropriate isolation precautions for identified infection/condition  Outcome: Progressing     Problem: SAFETY ADULT  Goal: Patient will remain free of falls  Description: INTERVENTIONS:  - Educate patient/family on patient safety including physical limitations  - Instruct patient to call for assistance with activity   - Consult OT/PT to assist with strengthening/mobility   - Keep Call bell within reach  - Keep bed low and locked with side rails adjusted as appropriate  - Keep care items and personal belongings within reach  - Initiate and maintain comfort rounds  - Make Fall Risk Sign visible to staff  - Offer Toileting every 2 Hours, in advance of need  - Initiate/Maintain bed/chair alarm  - Obtain necessary fall risk management equipment: bed rails  - Apply yellow socks and bracelet for high fall risk patients  - Consider moving patient to room near nurses station  Outcome: Progressing     Problem: DISCHARGE PLANNING  Goal: Discharge to home or other facility with appropriate resources  Description: INTERVENTIONS:  - Identify barriers to discharge w/patient and caregiver  - Arrange for needed discharge resources and transportation as appropriate  - Identify discharge learning needs (meds, wound care, etc.)  - Arrange for interpretive services to assist at discharge as needed  - Refer to Case Management Department for coordinating discharge planning if the patient needs post-hospital services based on physician/advanced practitioner order or complex needs related to functional status, cognitive ability, or social support system  Outcome: Progressing     Problem: Knowledge Deficit  Goal: Patient/family/caregiver demonstrates understanding of disease process, treatment plan, medications, and discharge instructions  Description: Complete learning assessment and assess knowledge base.   Interventions:  - Provide teaching at level of understanding  - Provide teaching via preferred learning methods  Outcome: Progressing     Problem: GASTROINTESTINAL - ADULT  Goal: Minimal or absence of nausea and/or vomiting  Description: INTERVENTIONS:  - Administer IV fluids if ordered to ensure adequate hydration  - Maintain NPO status until nausea and vomiting are resolved  - Nasogastric tube if ordered  - Administer ordered antiemetic medications as needed  - Provide nonpharmacologic comfort measures as appropriate  - Advance diet as tolerated, if ordered  - Consider nutrition services referral to assist patient with adequate nutrition and appropriate food choices  Outcome: Progressing  Goal: Maintains adequate nutritional intake  Description: INTERVENTIONS:  - Monitor percentage of each meal consumed  - Identify factors contributing to decreased intake, treat as appropriate  - Assist with meals as needed  - Monitor I&O, weight, and lab values if indicated  - Obtain nutrition services referral as needed  Outcome: Progressing     Problem: METABOLIC, FLUID AND ELECTROLYTES - ADULT  Goal: Electrolytes maintained within normal limits  Description: INTERVENTIONS:  - Monitor labs and assess patient for signs and symptoms of electrolyte imbalances  - Administer electrolyte replacement as ordered  - Monitor response to electrolyte replacements, including repeat lab results as appropriate  - Instruct patient on fluid and nutrition as appropriate  Outcome: Progressing

## 2023-07-12 NOTE — PROGRESS NOTES
[facility]  Progress Note  Name: Juan Jurado  MRN: [id number]  Unit/Bed#: [facility] -01 I Date of Admission: 2023   Date of Service: 2023 I Hospital Day: 1    Assessment/Plan   * Colitis  Assessment & Plan  perforated bowel with colitis and peritonitis. Family has elected for comfort care. He is actively dying. He looks very comfortable with the help of palliative care. Family at bedside and updated               Subjective:   Looks comfortable. Not waking up. Respirations are shallow. Does not look in distress. Objective:     Vitals:   Temp (24hrs), Av.9 °F (37.7 °C), Min:99.9 °F (37.7 °C), Max:99.9 °F (37.7 °C)    Temp:  [99.9 °F (37.7 °C)] 99.9 °F (37.7 °C)  HR:  [75-82] 82  Resp:  [18-20] 20  BP: ()/(41-54) 98/41  SpO2:  [90 %-95 %] 95 %  Body mass index is 32.23 kg/m². Input and Output Summary (last 24 hours): Intake/Output Summary (Last 24 hours) at 2023 1030  Last data filed at 2023 0554  Gross per 24 hour   Intake 1368.33 ml   Output --   Net 1368.33 ml       Physical Exam:     Physical Exam  Vitals and nursing note reviewed. HENT:      Head: Normocephalic and atraumatic. Eyes:      Pupils: Pupils are equal, round, and reactive to light. Cardiovascular:      Rate and Rhythm: Normal rate and regular rhythm. Heart sounds: No murmur heard. No friction rub. No gallop. Pulmonary:      Effort: Pulmonary effort is normal.      Breath sounds: Normal breath sounds. No wheezing or rales. Abdominal:      General: Bowel sounds are normal.      Palpations: Abdomen is soft. Tenderness: There is no abdominal tenderness. Musculoskeletal:      Right lower leg: No edema. Left lower leg: No edema.        .       Additional Data:     Labs:    Results from last 7 days   Lab Units 23  0125   WBC Thousand/uL 12.70*   HEMOGLOBIN g/dL 12.0   HEMATOCRIT % 36.2*   PLATELETS Thousands/uL 149   NEUTROS PCT % 82*   LYMPHS PCT % 11*   MONOS PCT % 7   EOS PCT % 0     Results from last 7 days   Lab Units 07/11/23  0125   POTASSIUM mmol/L 4.3   CHLORIDE mmol/L 101   CO2 mmol/L 20*   BUN mg/dL 20   CREATININE mg/dL 1.24   CALCIUM mg/dL 8.7   ALK PHOS U/L 91   ALT U/L 27   AST U/L 32         Results from last 7 days   Lab Units 07/11/23  0617   POC GLUCOSE mg/dl 175*               * I Have Reviewed All Lab Data     Recent Cultures (last 7 days):     Results from last 7 days   Lab Units 07/11/23  0303 07/11/23  0301   BLOOD CULTURE  No Growth at 24 hrs. No Growth at 24 hrs. Last 24 Hours Medication List:   Current Facility-Administered Medications   Medication Dose Route Frequency Provider Last Rate   • bisacodyl  10 mg Rectal Daily PRN Bon Villa MD     • glycopyrrolate  0.1 mg Intravenous Q1H PRN Bon Villa MD     • haloperidol lactate  0.5 mg Intravenous Q2H PRN Bon Villa MD     • HYDROmorphone  0.5 mg/hr Intravenous Continuous Angelica Plunkett DO 0.5 mg/hr (07/11/23 1834)   • HYDROmorphone  0.5 mg Intravenous Q1H PRN Bon Villa MD     • LORazepam  0.5 mg Intravenous Q2H PRN Bon Villa MD     • metroNIDAZOLE  500 mg Intravenous Q8H Mary Akers PA-C Stopped (07/12/23 0554)   • multi-electrolyte  50 mL/hr Intravenous Continuous Mary Akers PA-C 50 mL/hr (07/12/23 0900)   • ondansetron  4 mg Intravenous Q6H PRN Mary Akers PA-C           VTE Pharmacologic Prophylaxis:   Pharmacologic: comfort measures. Current Length of Stay: 1 day(s)    Current Patient Status: Inpatient       Discharge Plan: patient is actively dying. I anticipate he will pass today. Code Status: Level 4 - Comfort Care           Today, Patient Was Seen By: Sheng Null DO    ** Please Note: Dictation voice to text software may have been used in the creation of this document.  **

## 2023-07-12 NOTE — PROGRESS NOTES
Progress Note - General Surgery   Romulo Miller 81 y.o. male MRN: [id number]  Unit/Bed#: [place] [address] -01 Encounter: [id number]    Assessment:  Romulo Miller is a 81 y.o. male with multiple comorbidities who presents with concern for cecal ischemia, family is now elected for comfort care    No vitals obtained    Plan:  Recommend diet as tolerated  Palliative/hospice appreciated      Subjective/Objective     Subjective:   Nonconversant this morning. Sleeping    Objective:     Blood pressure (!) 98/41, pulse 82, temperature 99.9 °F (37.7 °C), resp. rate 20, height 5' 9" (1.753 m), weight 99 kg (218 lb 4.1 oz), SpO2 95 %. ,Body mass index is 32.23 kg/m². Intake/Output Summary (Last 24 hours) at 7/12/2023 0754  Last data filed at 7/11/2023 1835  Gross per 24 hour   Intake 728.33 ml   Output --   Net 728.33 ml       Invasive Devices     Peripheral Intravenous Line  Duration           Peripheral IV 07/11/23 Distal;Dorsal (posterior); Left Forearm <1 day    Peripheral IV 07/11/23 Distal;Dorsal (posterior); Right Forearm <1 day                Physical Exam:   Gen: NAD, Comfortable  Neuro: A&O, No focal deficits  Head: Normal Cephalic, Atraumatic  Eye: EOMI, PERRLA, No scleral icterus  Neck: Supple, No JVD, Midline trachea  CV: RRR, Cap refill <2 sec  Pulm: Normal work of breathing, no respiratory distress  Abd: Soft, distended, tender  Ext: No edema, Non-tender  Skin: warm, dry, intact

## 2023-07-12 NOTE — ASSESSMENT & PLAN NOTE
perforated bowel with colitis and peritonitis. Family has elected for comfort care. He is actively dying. He looks very comfortable with the help of palliative care.     Family at bedside and updated

## 2023-07-12 NOTE — PLAN OF CARE
Problem: Potential for Falls  Goal: Patient will remain free of falls  Description: INTERVENTIONS:  - Educate patient/family on patient safety including physical limitations  - Instruct patient to call for assistance with activity   - Consult OT/PT to assist with strengthening/mobility   - Keep Call bell within reach  - Keep bed low and locked with side rails adjusted as appropriate  - Keep care items and personal belongings within reach  - Initiate and maintain comfort rounds  - Make Fall Risk Sign visible to staff  - Offer Toileting every 2 Hours, in advance of need  - Initiate/Maintain bed alarm  - Obtain necessary fall risk management equipment: bed rails  - Apply yellow socks and bracelet for high fall risk patients  - Consider moving patient to room near nurses station  Outcome: Progressing     Problem: PAIN - ADULT  Goal: Verbalizes/displays adequate comfort level or baseline comfort level  Description: Interventions:  - Encourage patient to monitor pain and request assistance  - Assess pain using appropriate pain scale  - Administer analgesics based on type and severity of pain and evaluate response  - Implement non-pharmacological measures as appropriate and evaluate response  - Consider cultural and social influences on pain and pain management  - Notify physician/advanced practitioner if interventions unsuccessful or patient reports new pain  Outcome: Progressing     Problem: INFECTION - ADULT  Goal: Absence or prevention of progression during hospitalization  Description: INTERVENTIONS:  - Assess and monitor for signs and symptoms of infection  - Monitor lab/diagnostic results  - Monitor all insertion sites, i.e. indwelling lines, tubes, and drains  - Monitor endotracheal if appropriate and nasal secretions for changes in amount and color  - [place] appropriate cooling/warming therapies per order  - Administer medications as ordered  - Instruct and encourage patient and family to use good hand hygiene technique  - Identify and instruct in appropriate isolation precautions for identified infection/condition  Outcome: Progressing     Problem: SAFETY ADULT  Goal: Patient will remain free of falls  Description: INTERVENTIONS:  - Educate patient/family on patient safety including physical limitations  - Instruct patient to call for assistance with activity   - Consult OT/PT to assist with strengthening/mobility   - Keep Call bell within reach  - Keep bed low and locked with side rails adjusted as appropriate  - Keep care items and personal belongings within reach  - Initiate and maintain comfort rounds  - Make Fall Risk Sign visible to staff  - Offer Toileting every 2 Hours, in advance of need  - Initiate/Maintain bed alarm  - Obtain necessary fall risk management equipment: bed rails  - Apply yellow socks and bracelet for high fall risk patients  - Consider moving patient to room near nurses station  Outcome: Progressing     Problem: DISCHARGE PLANNING  Goal: Discharge to home or other facility with appropriate resources  Description: INTERVENTIONS:  - Identify barriers to discharge w/patient and caregiver  - Arrange for needed discharge resources and transportation as appropriate  - Identify discharge learning needs (meds, wound care, etc.)  - Arrange for interpretive services to assist at discharge as needed  - Refer to Case Management Department for coordinating discharge planning if the patient needs post-hospital services based on physician/advanced practitioner order or complex needs related to functional status, cognitive ability, or social support system  Outcome: Progressing     Problem: Knowledge Deficit  Goal: Patient/family/caregiver demonstrates understanding of disease process, treatment plan, medications, and discharge instructions  Description: Complete learning assessment and assess knowledge base.   Interventions:  - Provide teaching at level of understanding  - Provide teaching via preferred learning methods  Outcome: Progressing     Problem: GASTROINTESTINAL - ADULT  Goal: Minimal or absence of nausea and/or vomiting  Description: INTERVENTIONS:  - Administer IV fluids if ordered to ensure adequate hydration  - Maintain NPO status until nausea and vomiting are resolved  - Nasogastric tube if ordered  - Administer ordered antiemetic medications as needed  - Provide nonpharmacologic comfort measures as appropriate  - Advance diet as tolerated, if ordered  - Consider nutrition services referral to assist patient with adequate nutrition and appropriate food choices  Outcome: Progressing  Goal: Maintains adequate nutritional intake  Description: INTERVENTIONS:  - Monitor percentage of each meal consumed  - Identify factors contributing to decreased intake, treat as appropriate  - Assist with meals as needed  - Monitor I&O, weight, and lab values if indicated  - Obtain nutrition services referral as needed  Outcome: Progressing     Problem: METABOLIC, FLUID AND ELECTROLYTES - ADULT  Goal: Electrolytes maintained within normal limits  Description: INTERVENTIONS:  - Monitor labs and assess patient for signs and symptoms of electrolyte imbalances  - Administer electrolyte replacement as ordered  - Monitor response to electrolyte replacements, including repeat lab results as appropriate  - Instruct patient on fluid and nutrition as appropriate  Outcome: Progressing     Problem: MOBILITY - ADULT  Goal: Maintain or return to baseline ADL function  Description: INTERVENTIONS:  -  Assess patient's ability to carry out ADLs; assess patient's baseline for ADL function and identify physical deficits which impact ability to perform ADLs (bathing, care of mouth/teeth, toileting, grooming, dressing, etc.)  - Assess/evaluate cause of self-care deficits   - Assess range of motion  - Assess patient's mobility; develop plan if impaired  - Assess patient's need for assistive devices and provide as appropriate  - Encourage maximum independence but intervene and supervise when necessary  - Involve family in performance of ADLs  - Assess for home care needs following discharge   - Consider OT consult to assist with ADL evaluation and planning for discharge  - Provide patient education as appropriate  Outcome: Progressing  Goal: Maintains/Returns to pre admission functional level  Description: INTERVENTIONS:  - Perform BMAT or MOVE assessment daily.   - Set and communicate daily mobility goal to care team and patient/family/caregiver. - Collaborate with rehabilitation services on mobility goals if consulted  - Perform Range of Motion 2 times a day. - Reposition patient every 2 hours.   - Out of bed for toileting  - Record patient progress and toleration of activity level   Outcome: Progressing     Problem: Prexisting or High Potential for Compromised Skin Integrity  Goal: Skin integrity is maintained or improved  Description: INTERVENTIONS:  - Identify patients at risk for skin breakdown  - Assess and monitor skin integrity  - Assess and monitor nutrition and hydration status  - Monitor labs   - Assess for incontinence   - Turn and reposition patient  - Assist with mobility/ambulation  - Relieve pressure over bony prominences  - Avoid friction and shearing  - Provide appropriate hygiene as needed including keeping skin clean and dry  - Evaluate need for skin moisturizer/barrier cream  - Collaborate with interdisciplinary team   - Patient/family teaching  - Consider wound care consult   Outcome: Progressing

## 2023-07-12 NOTE — CONSULTS
Visited with wife of patient provided listening support, called  to anoint patient, will follow as needed.

## 2023-07-12 NOTE — CASE COMMUNICATION
For [address]. Pt is an 82 YO male currently at [facility]  Dxs: CAD, Chronic diastolic CHF, HTN, Vascular Parkinson's, CKD 3, Afib, Ambulatory dysfunction, edema. Presented to [facility] ER with severe abdominal pain Dxed with cecal ischemia and impending perforation. This problem demands surgical intervention which was discussed extensively with family; given patient's functional status goals of care with the patient and the family lik e to defer surgical management at this time. They understand that without surgical intervention it is likely the patient will decompensate and succumb to this disease process. WBC: 12.7; Hb; LA: 1.7 from 2.4 CTAP showed Edema and irregular enhancement of the cecum with small adjacent foci of portal venous gas, concerning for bowel ischemia. Thinning along the medial wall of the cecum may suggest an area of bowel necrosis Pt is curr ently on Dilaudid Gtt at 0.5mg/hr. O2 2 Liters NC PPS 20. Approve for IPU?     Approved for IPU by Dr Maury Quach 7  Dx Ischemic bowel

## 2023-07-12 NOTE — PROGRESS NOTES
Pastoral Care Progress Note    2023  Patient: Magdiel Verde : 1935  Admission Date & Time: 2023 0058  MRN: [id number] CSN: [id number]    Visited with wife of patient who is actively dying provided listening support, and  pastoral care presence call  to come and anoint patient. Will continue to follow patient and support wife.

## 2023-07-12 NOTE — HOSPICE NOTE
Pt is approved for the hospice house today per Dr Martina Dye. There are currently no beds available. He is on the waiting list. Updated pts wife, CM and Dr Camille Saavedra of the same.

## 2023-07-12 NOTE — CASE MANAGEMENT
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 2022   • Cognitive decline 2022   • Vascular parkinsonism ([address]) 2022   • Ambulatory dysfunction 2021   • Weakness 2021   • Pacemaker 2021   • Bradycardia 2021   • Peripheral artery insufficiency ([address]) 2020   • Atherosclerosis of native artery of extremity ([address]) 2020   • DM type 2 with diabetic peripheral neuropathy ([address]) 2020   • Atherosclerosis 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 ([address]) 2020   • Chronic diastolic congestive heart failure, NYHA class 2 ([address]) 2019   • 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 2018   • Spinal stenosis of lumbar region without neurogenic claudication 2018   • Hyponatremia with excess extracellular fluid volume 08/10/2018   • Chest pain in adult 2018   • Non-rheumatic mitral regurgitation 08/10/2017   • Aortic prosthetic valve regurgitation 08/10/2017   • History of heart valve replacement with tissue graft 2017   • Hyperlipidemia 2017   • Hypertensive heart disease with congestive heart failure ([address]) 2016   • Peripheral edema 2016   • Edema 2016   • 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/2016   • Multiple vessel coronary artery disease 2016      LOS (days): 1  Geometric Mean LOS (GMLOS) (days): 5.20  Days to GMLOS:4     OBJECTIVE:  Risk of Unplanned Readmission Score: 14.94 Current admission status: Inpatient   Preferred Pharmacy:   [facility] [address] [place] [address] [address] [facility] [address]acho [place] [address]  Phone: [phone] Fax: [phone]    Primary Care Provider: Kelvin Chinchilla MD    Primary Insurance: MEDICARE  Secondary Insurance: [facility]    DISCHARGE DETAILS:            Additional Comments: [facility] for [facility], however there is no open bed at this time.

## 2023-07-12 NOTE — PROGRESS NOTES
07/12/23 1200   Clinical Encounter Type   Visited With Family   Routine Visit Introduction   Continue Visiting Yes   Consult Patient care   Sacramental Encounters   Sacrament of Sick-Anointing Anointed

## 2023-07-13 NOTE — HOSPICE NOTE
Hospice inpatient bed available today. Talked with wife Ibeth Adams and she would prefer to not move patient as he appears to be imminent. Made Dr Nunu Reese aware. Liaison will check in again tomorrow.

## 2023-07-13 NOTE — PROGRESS NOTES
Progress Note - Palliative & Supportive Care  Danny Delarosa Creed  81 y.o.  male  [facility] [address] Formerly [facility] [address] M*   MRN: [id number]  Encounter: [id number]     ASSESSMENT:    Patient Active Problem List   Diagnosis   • Hypertensive heart disease with congestive heart failure ([address])   • History of heart valve replacement with tissue graft   • Hyperlipidemia   • Multiple vessel coronary artery disease   • Non-rheumatic mitral regurgitation   • Peripheral edema   • Aortic prosthetic valve regurgitation   • Right bundle branch block with left anterior fascicular block   • Chest pain in adult   • Hyponatremia with excess extracellular fluid volume   • 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   • Spinal stenosis of lumbar region without neurogenic claudication   • Edema   • Chronic diastolic congestive heart failure, NYHA class 2 ([address])   • Atherosclerosis of native artery of extremity ([address])   • DM type 2 with diabetic peripheral neuropathy ([address])   • Atherosclerosis 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 ([address])   • Peripheral artery insufficiency (HCC)   • Ambulatory dysfunction   • Bradycardia   • Pacemaker   • Weakness   • Essential hypertension   • 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   • Cognitive decline   • Vascular parkinsonism (HCC)   • Depression, recurrent (HCC)   • Paroxysmal atrial fibrillation (HCC)   • Stage 3a chronic kidney disease (HCC)   • Colitis       Active problems addressed:  Vascular Parkinsonism  Chronic HFpEF  s/p bioprosthetic AV repair  CKD3  Acute abdominal pain  Suspected cecal ischemia  Suspected bowel necrosis  Concerns for impending bowel perforation  DM  HTN  Goals of care    PLAN:    1.  Symptom management:  • Dilaudid 0.5mg/hr gtt  • Dilaudid 0.5mg IV q1H nursing bolus prn for moderate/severe pain and/or dyspnea/air hunger - none used   • Ativan 0.5mg q2H prn anxiety/SOB/air hunger  • Haldol 0.5mg IV q2H prn agitation/N/V/hiccups - encouraged RN to give more prn for hiccups  • Robinul 0.1mg IV q1H prn secretions  • Dulcolax 10mg rectal supp prn constipation  • Pls reach out to palliative on call via TT if needing help with comfort meds  • D/w RN, SLIM    ADDENDUM: She later on let me know that hiccups dissipated an hour after receiving haldol. But returned 2 hours after administration. Continue hadol as ordered prn.    2. Goals:   • Level 4, full comfort    Code status: Level 4 - Comfort Care   Decisional apparatus:  Patient does not have capacity to make medical decisions on my exam today. If such capacity is lost, patient's substitute decision maker would default to wife by [place] Act 169. Advance Directive / Living Will / POLST:  None on file    3. Prognosis: PPS 10%, hours to days    We appreciate the opportunity to participate in this patient's care. We will continue to follow. Please do not hesitate to contact our on-call provider through our clinic answering service at [phone] should you have acute symptom control concerns. INTERVAL HISTORY:  Chart reviewed. No acute events overnight. MAR reviewed. Saw patient this am. Largely unresponsive. Hiccups noted. Patient appears comfortable. No family at bedside at time of my visit. I asked the RN to administer haldol prn for hiccups as ordered. UO: 50cc since yesterday afternoon    ADDENDUM: She later on let me know that hiccups dissipated an hour after receiving haldol. But returned 2 hours after administration. Continue hadol as ordered prn. Review of Systems   Unable to perform ROS: Mental status change       MEDICATIONS / ALLERGIES:  all current active meds have been reviewed    Allergies   Allergen Reactions   • Orphenadrine GI Intolerance       OBJECTIVE:  BP (!) 98/41   Pulse 82   Temp 99.9 °F (37.7 °C)   Resp 20   Ht 5' 9" (1.753 m)   Wt 99 kg (218 lb 4.1 oz)   SpO2 95%   BMI 32.23 kg/m²   Physical Exam:    Physical Exam  Constitutional:       General: He is not in acute distress. Appearance: He is ill-appearing.  He is not toxic-appearing or diaphoretic. Comments: Comfortable, hiccups   HENT:      Head: Normocephalic and atraumatic. Eyes:      General:         Right eye: No discharge. Left eye: No discharge. Comments: Eyes closed, lids normal   Pulmonary:      Effort: Pulmonary effort is normal. No respiratory distress. Abdominal:      General: Abdomen is flat. There is no distension. Skin:     General: Skin is warm and dry. Coloration: Skin is not jaundiced or pale. Neurological:      Comments: Largely unreponsive   Psychiatric:      Comments: calm         Lab Results:   I have personally reviewed pertinent labs. Imaging Studies: I have personally reviewed pertinent reports. EKG, Pathology, and Other Studies: I have personally reviewed pertinent reports. Counseling / Coordination of Care  Total floor / unit time spent today 20 minutes. Greater than 50% of total time was spent with the patient and / or family counseling and / or coordination of care. A description of the counseling / coordination of care: provided medical updates, discussed palliative care, determined competency, determined goals of care, determined POA, determined social/family support, discussed plans of care, discussed symptom management, provided psychosocial support.     Matt Dunne MD  [address] [place] [address] Dr [facility]  [phone]

## 2023-07-14 NOTE — DISCHARGE SUMMARY
[address]  Discharge- Diana Genao 1935, 81 y.o. male MRN: [id number]  Unit/Bed#: [facility] -01 Encounter: [id number]  Primary Care Provider: Pratibha Pham MD   Date and time admitted to hospital: 7/11/2023 12:58 AM    * Colitis  Assessment & Plan  perforated bowel with colitis and peritonitis. Family has elected for comfort care. He is actively dying. Patient is stable. He is going to hospice house    I spoke with cardiology. The pacemaker does not need to be turned off        Discharging Physician / Practitioner: Basia Hughes DO  PCP: Pratibha Pham MD  Admission Date:   Admission Orders (From admission, onward)     Ordered        07/11/23 0432  INPATIENT ADMISSION  Once                      Discharge Date: 07/14/23    Medical Problems     Resolved Problems  Date Reviewed: 7/11/2023   None           Consultations During Hospital Stay:  · General surgery  · Palliative care    ·       Reason for Admission: Abdominal pain      Hospital Course:     Diana Genao is a 81 y.o. male patient who originally presented to the hospital on 7/11/2023 due to abdominal pain. Patient unfortunately found to have perforated bowel with colitis and peritonitis. He was seen by general surgery. Due to his age and comorbidities he was not thought to be a candidate for surgery. He was felt to not be able to survive surgery. His family was in agreement. They have also talked to the patient in the past and he said he would never want any aggressive treatment such as surgeries. So they decided to make him comfort measures only. He has stabilized on a Dilaudid drip. He is not awake. But still breathing comfortably. So he is moving to hospice house. Please see above list of diagnoses and related plan for additional information. Condition at Discharge: stable       Discharge Day Visit / Exam:     Subjective: Breathing comfortably.   Eyes open but does not track around room. Does not look in distress. Vitals: Blood Pressure: (!) 98/41 (07/11/23 1506)  Pulse: 82 (07/11/23 1506)  Temperature: 99.9 °F (37.7 °C) (07/11/23 1506)  Temp Source: Axillary (07/11/23 1504)  Respirations: 20 (07/11/23 1506)  Height: 5' 9" (175.3 cm) (07/11/23 0805)  Weight - Scale: 99 kg (218 lb 4.1 oz) (07/12/23 0555)  SpO2: 95 % (07/11/23 2000)    Exam:     Physical Exam  Vitals and nursing note reviewed. HENT:      Head: Normocephalic and atraumatic. Eyes:      Pupils: Pupils are equal, round, and reactive to light. Cardiovascular:      Rate and Rhythm: Normal rate and regular rhythm. Heart sounds: No murmur heard. No friction rub. No gallop. Pulmonary:      Effort: Pulmonary effort is normal.      Breath sounds: Normal breath sounds. No wheezing or rales. Abdominal:      Tenderness: There is no abdominal tenderness. Musculoskeletal:      Right lower leg: No edema. Left lower leg: No edema. .         Discharge instructions/Information to patient and family:   See after visit summary for information provided to patient and family. Provisions for Follow-Up Care:  See after visit summary for information related to follow-up care and any pertinent home health orders. Disposition:     Other: Hospice House       Discharge Statement:  I spent 40 minutes discharging the patient. This time was spent on the day of discharge. I had direct contact with the patient on the day of discharge. Greater than 50% of the total time was spent examining patient, answering all patient questions, arranging and discussing plan of care with patient as well as directly providing post-discharge instructions. Additional time then spent on discharge activities. Discharge Medications:  See after visit summary for reconciled discharge medications provided to patient and family.       ** Please Note: This note has been constructed using a voice recognition system **

## 2023-07-14 NOTE — PLAN OF CARE
Problem: Potential for Falls  Goal: Patient will remain free of falls  Description: INTERVENTIONS:  - Educate patient/family on patient safety including physical limitations  - Instruct patient to call for assistance with activity   - Consult OT/PT to assist with strengthening/mobility   - Keep Call bell within reach  - Keep bed low and locked with side rails adjusted as appropriate  - Keep care items and personal belongings within reach  - Initiate and maintain comfort rounds  - Make Fall Risk Sign visible to staff  - Offer Toileting every 2 Hours, in advance of need  - Initiate/Maintain bed alarm  - Obtain necessary fall risk management equipment: yellow socks  - Apply yellow socks and bracelet for high fall risk patients  - Consider moving patient to room near nurses station  Outcome: Progressing     Problem: PAIN - ADULT  Goal: Verbalizes/displays adequate comfort level or baseline comfort level  Description: Interventions:  - Encourage patient to monitor pain and request assistance  - Assess pain using appropriate pain scale  - Administer analgesics based on type and severity of pain and evaluate response  - Implement non-pharmacological measures as appropriate and evaluate response  - Consider cultural and social influences on pain and pain management  - Notify physician/advanced practitioner if interventions unsuccessful or patient reports new pain  Outcome: Progressing     Problem: INFECTION - ADULT  Goal: Absence or prevention of progression during hospitalization  Description: INTERVENTIONS:  - Assess and monitor for signs and symptoms of infection  - Monitor lab/diagnostic results  - Monitor all insertion sites, i.e. indwelling lines, tubes, and drains  - Monitor endotracheal if appropriate and nasal secretions for changes in amount and color  - [place] appropriate cooling/warming therapies per order  - Administer medications as ordered  - Instruct and encourage patient and family to use good hand hygiene technique  - Identify and instruct in appropriate isolation precautions for identified infection/condition  Outcome: Progressing     Problem: SAFETY ADULT  Goal: Patient will remain free of falls  Description: INTERVENTIONS:  - Educate patient/family on patient safety including physical limitations  - Instruct patient to call for assistance with activity   - Consult OT/PT to assist with strengthening/mobility   - Keep Call bell within reach  - Keep bed low and locked with side rails adjusted as appropriate  - Keep care items and personal belongings within reach  - Initiate and maintain comfort rounds  - Make Fall Risk Sign visible to staff  - Offer Toileting every 2 Hours, in advance of need  - Initiate/Maintain bed alarm  - Obtain necessary fall risk management equipment:   - Apply yellow socks and bracelet for high fall risk patients  - Consider moving patient to room near nurses station  Outcome: Progressing     Problem: DISCHARGE PLANNING  Goal: Discharge to home or other facility with appropriate resources  Description: INTERVENTIONS:  - Identify barriers to discharge w/patient and caregiver  - Arrange for needed discharge resources and transportation as appropriate  - Identify discharge learning needs (meds, wound care, etc.)  - Arrange for interpretive services to assist at discharge as needed  - Refer to Case Management Department for coordinating discharge planning if the patient needs post-hospital services based on physician/advanced practitioner order or complex needs related to functional status, cognitive ability, or social support system  Outcome: Progressing     Problem: Knowledge Deficit  Goal: Patient/family/caregiver demonstrates understanding of disease process, treatment plan, medications, and discharge instructions  Description: Complete learning assessment and assess knowledge base.   Interventions:  - Provide teaching at level of understanding  - Provide teaching via preferred learning methods  Outcome: Progressing     Problem: GASTROINTESTINAL - ADULT  Goal: Minimal or absence of nausea and/or vomiting  Description: INTERVENTIONS:  - Administer IV fluids if ordered to ensure adequate hydration  - Maintain NPO status until nausea and vomiting are resolved  - Nasogastric tube if ordered  - Administer ordered antiemetic medications as needed  - Provide nonpharmacologic comfort measures as appropriate  - Advance diet as tolerated, if ordered  - Consider nutrition services referral to assist patient with adequate nutrition and appropriate food choices  Outcome: Progressing  Goal: Maintains adequate nutritional intake  Description: INTERVENTIONS:  - Monitor percentage of each meal consumed  - Identify factors contributing to decreased intake, treat as appropriate  - Assist with meals as needed  - Monitor I&O, weight, and lab values if indicated  - Obtain nutrition services referral as needed  Outcome: Progressing     Problem: METABOLIC, FLUID AND ELECTROLYTES - ADULT  Goal: Electrolytes maintained within normal limits  Description: INTERVENTIONS:  - Monitor labs and assess patient for signs and symptoms of electrolyte imbalances  - Administer electrolyte replacement as ordered  - Monitor response to electrolyte replacements, including repeat lab results as appropriate  - Instruct patient on fluid and nutrition as appropriate  Outcome: Progressing     Problem: MOBILITY - ADULT  Goal: Maintain or return to baseline ADL function  Description: INTERVENTIONS:  -  Assess patient's ability to carry out ADLs; assess patient's baseline for ADL function and identify physical deficits which impact ability to perform ADLs (bathing, care of mouth/teeth, toileting, grooming, dressing, etc.)  - Assess/evaluate cause of self-care deficits   - Assess range of motion  - Assess patient's mobility; develop plan if impaired  - Assess patient's need for assistive devices and provide as appropriate  - Encourage maximum independence but intervene and supervise when necessary  - Involve family in performance of ADLs  - Assess for home care needs following discharge   - Consider OT consult to assist with ADL evaluation and planning for discharge  - Provide patient education as appropriate  Outcome: Progressing  Goal: Maintains/Returns to pre admission functional level  Description: INTERVENTIONS:  - Perform BMAT or MOVE assessment daily.   - Set and communicate daily mobility goal to care team and patient/family/caregiver. - Collaborate with rehabilitation services on mobility goals if consulted  - Perform Range of Motion 4 times a day. - Reposition patient every 2 hours.   - Dangle patient 3 times a day  - Stand patient 3 times a day  - Ambulate patient 3 times a day  - Out of bed to chair 3 times a day   - Out of bed for meals 3 times a day  - Out of bed for toileting  - Record patient progress and toleration of activity level   Outcome: Progressing     Problem: Prexisting or High Potential for Compromised Skin Integrity  Goal: Skin integrity is maintained or improved  Description: INTERVENTIONS:  - Identify patients at risk for skin breakdown  - Assess and monitor skin integrity  - Assess and monitor nutrition and hydration status  - Monitor labs   - Assess for incontinence   - Turn and reposition patient  - Assist with mobility/ambulation  - Relieve pressure over bony prominences  - Avoid friction and shearing  - Provide appropriate hygiene as needed including keeping skin clean and dry  - Evaluate need for skin moisturizer/barrier cream  - Collaborate with interdisciplinary team   - Patient/family teaching  - Consider wound care consult   Outcome: Progressing

## 2023-07-14 NOTE — CASE MANAGEMENT
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 2022   • Cognitive decline 2022   • Vascular parkinsonism ([address]) 2022   • Ambulatory dysfunction 2021   • Weakness 2021   • Pacemaker 2021   • Bradycardia 2021   • Peripheral artery insufficiency ([address]) 2020   • Atherosclerosis of native artery of extremity ([address]) 2020   • DM type 2 with diabetic peripheral neuropathy ([address]) 2020   • Atherosclerosis 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 ([address]) 2020   • Chronic diastolic congestive heart failure, NYHA class 2 ([address]) 2019   • 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 2018   • Spinal stenosis of lumbar region without neurogenic claudication 2018   • Hyponatremia with excess extracellular fluid volume 08/10/2018   • Chest pain in adult 2018   • Non-rheumatic mitral regurgitation 08/10/2017   • Aortic prosthetic valve regurgitation 08/10/2017   • History of heart valve replacement with tissue graft 2017   • Hyperlipidemia 2017   • Hypertensive heart disease with congestive heart failure ([address]) 2016   • Peripheral edema 2016   • Edema 2016   • 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/2016   • Multiple vessel coronary artery disease 2016      LOS (days): 3  Geometric Mean LOS (GMLOS) (days): 5.20  Days to GMLOS:1.9     OBJECTIVE:  Risk of Unplanned Readmission Score: 15.07 Current admission status: Inpatient   Preferred Pharmacy:   [facility] [address] [place] [address] [address] [facility] [address]  Phone: [phone] Fax: [phone]    Primary Care Provider: Oj Bean MD    Primary Insurance: MEDICARE  Secondary Insurance: AAR    DISCHARGE DETAILS:               Additional Comments: [address] [place] [address],[address] will have a bed today. Liasion will talk with family. There is a bed today and they would like transport for 1430 . MD is discharging to [id number] [place] [address],[address] today. Completed Med Nec and put on chart. Booked BLS and will f/u with time.     Accepting Facility Name, [address] [place] [address] : [address] [place] [address],[facility]  Receiving Facility/Agency Phone Number: [phone]

## 2023-07-14 NOTE — CASE MANAGEMENT
Case Management Discharge Planning Note    Patient name Steven Barksdale  Location [place] [address] /[address] Shyam Davis* MRN [id number]  : 1935 Date 2023       Current Admission Date: 2023  Current Admission Diagnosis:Colitis   Patient Active Problem List    Diagnosis Date Noted   • Colitis 2023   • Depression, recurrent ([address]) 2022   • Paroxysmal atrial fibrillation ([address]) 2022   • Stage 3a chronic kidney disease ([address]) 2022   • Essential hypertension 2022   • 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 2022   • Cognitive decline 2022   • Vascular parkinsonism ([address]) 2022   • Ambulatory dysfunction 2021   • Weakness 2021   • Pacemaker 2021   • Bradycardia 2021   • Peripheral artery insufficiency ([address]) 2020   • Atherosclerosis of native artery of extremity ([address]) 2020   • DM type 2 with diabetic peripheral neuropathy ([address]) 2020   • Atherosclerosis 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 ([address]) 2020   • Chronic diastolic congestive heart failure, NYHA class 2 ([address]) 2019   • 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 2018   • Spinal stenosis of lumbar region without neurogenic claudication 2018   • Hyponatremia with excess extracellular fluid volume 08/10/2018   • Chest pain in adult 2018   • Non-rheumatic mitral regurgitation 08/10/2017   • Aortic prosthetic valve regurgitation 08/10/2017   • History of heart valve replacement with tissue graft 2017   • Hyperlipidemia 2017   • Hypertensive heart disease with congestive heart failure ([address]) 2016   • Peripheral edema 2016   • Edema 2016   • 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/2016   • Multiple vessel coronary artery disease 2016      LOS (days): 3  Geometric Mean LOS (GMLOS) (days): 5.20  Days to GMLOS:1.9     OBJECTIVE:  Risk of Unplanned Readmission Score: 15.1 Current admission status: Inpatient   Preferred Pharmacy:   [facility] [address] [place] [address] [address] [place] [address]  Phone: [phone] Fax: [phone]    Primary Care Provider: Fer Delgado MD    Primary Insurance: MEDICARE  Secondary Insurance: Sailaja Acosta DETAILS:                                                          Transport at Discharge : [facility]  Dispatcher Contacted: Yes        ETA of Transport (Date): 07/14/23  ETA of Transport (Time): 1430                       Additional Comments:  time by [facility] is 1430. MD,  Hospice RN- will inform spouse, floor RN and charge nurse aware.     Accepting Facility Name, [address] [place] [address] : [facility]  Receiving Facility/Agency Phone Number: [phone]

## 2023-07-14 NOTE — PROGRESS NOTES
[facility]  Progress Note  Name: Rody Reynolds  MRN: [id number]  Unit/Bed#: [place] 2 -01 I Date of Admission: 7/11/2023   Date of Service: 7/14/2023 I Hospital Day: 3    Assessment/Plan   * Colitis  Assessment & Plan  perforated bowel with colitis and peritonitis. Family has elected for comfort care. He is actively dying. Patient does look comfortable. Family asked about whether we need to turn his pacemaker off  He had a dual-chamber pacemaker placed in 2021. There is no defibrillator. I spoke with cardiology. The pacemaker does not need to be turned off                 Subjective:   Looks comfortable. Slow breathing. Eyes open but does not track around room  No grimacing  Does not look in respiratory distress      Objective:     Vitals:   No data recorded. Body mass index is 32.23 kg/m². Input and Output Summary (last 24 hours): Intake/Output Summary (Last 24 hours) at 7/14/2023 1009  Last data filed at 7/14/2023 0401  Gross per 24 hour   Intake 637.43 ml   Output 60 ml   Net 577.43 ml       Physical Exam:     Physical Exam  Vitals and nursing note reviewed. HENT:      Head: Normocephalic and atraumatic. Eyes:      Pupils: Pupils are equal, round, and reactive to light. Cardiovascular:      Rate and Rhythm: Normal rate and regular rhythm. Heart sounds: No murmur heard. No friction rub. No gallop. Pulmonary:      Effort: Pulmonary effort is normal.      Breath sounds: Normal breath sounds. No wheezing or rales. Abdominal:      General: Bowel sounds are normal.      Palpations: Abdomen is soft. Tenderness: There is no abdominal tenderness. Musculoskeletal:      Right lower leg: No edema. Left lower leg: No edema.        .       Additional Data:     Labs:    Results from last 7 days   Lab Units 07/11/23  0125   WBC Thousand/uL 12.70*   HEMOGLOBIN g/dL 12.0   HEMATOCRIT % 36.2*   PLATELETS Thousands/uL 149   NEUTROS PCT % 82*   LYMPHS PCT % 11*   MONOS PCT % 7   EOS PCT % 0     Results from last 7 days   Lab Units 07/11/23  0125   POTASSIUM mmol/L 4.3   CHLORIDE mmol/L 101   CO2 mmol/L 20*   BUN mg/dL 20   CREATININE mg/dL 1.24   CALCIUM mg/dL 8.7   ALK PHOS U/L 91   ALT U/L 27   AST U/L 32         Results from last 7 days   Lab Units 07/11/23  0617   POC GLUCOSE mg/dl 175*               * I Have Reviewed All Lab Data     Recent Cultures (last 7 days):     Results from last 7 days   Lab Units 07/11/23  0303 07/11/23  0301   BLOOD CULTURE  No Growth at 72 hrs. No Growth at 72 hrs. Last 24 Hours Medication List:   Current Facility-Administered Medications   Medication Dose Route Frequency Provider Last Rate   • bisacodyl  10 mg Rectal Daily PRN Manny Pardo MD     • glycopyrrolate  0.1 mg Intravenous Q1H PRN Manny Pardo MD     • haloperidol lactate  0.5 mg Intravenous Q2H PRN Manny Pardo MD     • HYDROmorphone  0.5 mg/hr Intravenous Continuous Arabella Gold Prechtel, DO 0.5 mg/hr (07/13/23 2040)   • HYDROmorphone  0.5 mg Intravenous Q1H PRN Manny Pardo MD     • LORazepam  0.5 mg Intravenous Q2H PRN Manny Pardo MD     • multi-electrolyte  50 mL/hr Intravenous Continuous Mary Akers PA-C 50 mL/hr (07/13/23 0908)   • ondansetron  4 mg Intravenous Q6H PRN Mary Akers PA-C           VTE Pharmacologic Prophylaxis:   Pharmacologic: none with comfort measures      Current Length of Stay: 3 day(s)    Current Patient Status: Inpatient       Discharge Plan: comfort measures on dilaudid PCA    Code Status: Level 4 - Comfort Care           Today, Patient Was Seen By: Ricki Martins DO    ** Please Note: Dictation voice to text software may have been used in the creation of this document.  **

## 2023-07-14 NOTE — ASSESSMENT & PLAN NOTE
perforated bowel with colitis and peritonitis. Family has elected for comfort care. He is actively dying. Patient does look comfortable. Family asked about whether we need to turn his pacemaker off  He had a dual-chamber pacemaker placed in 2021. There is no defibrillator. I spoke with cardiology.   The pacemaker does not need to be turned off

## 2023-07-14 NOTE — NURSING NOTE
Patient discharged to IP hospice. Report called to [facility]. IVx 2 remain intact and patent. Hodgson remains intact and patent at time of discharge. Belongings sent with family.

## 2023-07-14 NOTE — ASSESSMENT & PLAN NOTE
perforated bowel with colitis and peritonitis. Family has elected for comfort care. He is actively dying. Patient is stable. He is going to hospice house    I spoke with cardiology.   The pacemaker does not need to be turned off

## 2023-07-14 NOTE — HOSPICE NOTE
Patient for transfer to inpatient hospice house today at 230. Liaison met with wife Javon Griffin and Stephanie Monroe at bedside. Consents reviewed and signed. RN to please call with report a 1/2 hr prior to transport  [phone]. Keep IV in but disconnect fluids. Keep choi cath in.  Medicate as need for the trip.

## 2023-07-14 NOTE — PROGRESS NOTES
Pastoral Care Progress Note    2023  Patient: Davie Villa : 1935  Admission Date & Time: 2023  MRN: [id number] CSN: [id number]      Continue to follow patient, wife and son visiting, patient looks comfortable,  with slow breathing. Provided listening support, and pastoral care presence.

## 2023-07-14 NOTE — PROGRESS NOTES
07/14/23 1100   Clinical Encounter Type   Visited With Patient   Routine Visit Follow-up   Continue Visiting Yes

## 2023-07-16 LAB
BACTERIA BLD CULT: NORMAL
BACTERIA BLD CULT: NORMAL

## 2023-07-20 ENCOUNTER — HOME CARE VISIT (OUTPATIENT)
Dept: HOME HOSPICE | Facility: HOSPICE | Age: 88
End: 2023-07-20
Payer: MEDICARE

## 2023-07-20 NOTE — CASE COMMUNICATION
Magdiel Verde, Bereavement Final IDG [id number] (Spike Joo) Due: 23   : 1935  SOC: 23  DOD: 7/15/23, <24hrs  Diagnosis: CHF, CKD, Parkinson's  Primary: Wife - Nirmala Ramires was an 81year old man at the [facility] less than 24 hours. Wife of 77 years, Christina Carver, son Jono Tristan and daughters Jorge Alberto Polk and Octaviano porter visited. 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 He was [employer] and received SOS in the hospital. Christina Carver stated her  had been " ready to go". She shared that they thought he would die in the hospital and voiced being unsure why he was still here. Only Christina Carver and Jono Tristan provided information for bereavement follow-up. They appear to live together, so mailings to be sent in [address] [place] [address]. TOD: Christina Carver was notified by phone of her 's passing. Christina Carver stated she & her family had made their peace with his passing and that seeing him in a regular shirt, not a hospital g own, meant a lot to her. She voiced appreciation for the care her  received during his short stay at the hospice house. She shared when she left the house she was content that he looked peaceful. Family declined to return to the [facility]. Call Assignments:  Anjelica Gay to reassess wife Alexsander Stout and son Bren James. at [facility]. (Due: 23)   *Mailings to just be sent to Alexsander Stout, as Christina Carver and Ramez live together. *